# Patient Record
Sex: FEMALE | Race: WHITE | NOT HISPANIC OR LATINO | Employment: FULL TIME | URBAN - METROPOLITAN AREA
[De-identification: names, ages, dates, MRNs, and addresses within clinical notes are randomized per-mention and may not be internally consistent; named-entity substitution may affect disease eponyms.]

---

## 2017-02-09 ENCOUNTER — COMMUNICATION - HEALTHEAST (OUTPATIENT)
Dept: FAMILY MEDICINE | Facility: CLINIC | Age: 37
End: 2017-02-09

## 2017-03-10 ENCOUNTER — RECORDS - HEALTHEAST (OUTPATIENT)
Dept: ADMINISTRATIVE | Facility: OTHER | Age: 37
End: 2017-03-10

## 2017-05-17 ENCOUNTER — OFFICE VISIT - HEALTHEAST (OUTPATIENT)
Dept: FAMILY MEDICINE | Facility: CLINIC | Age: 37
End: 2017-05-17

## 2017-05-17 DIAGNOSIS — J01.90 ACUTE SINUSITIS: ICD-10-CM

## 2017-05-17 DIAGNOSIS — G43.829 MENSTRUAL MIGRAINE WITHOUT STATUS MIGRAINOSUS, NOT INTRACTABLE: ICD-10-CM

## 2017-05-26 ENCOUNTER — OFFICE VISIT - HEALTHEAST (OUTPATIENT)
Dept: FAMILY MEDICINE | Facility: CLINIC | Age: 37
End: 2017-05-26

## 2017-05-26 DIAGNOSIS — R10.9 ABDOMINAL PAIN: ICD-10-CM

## 2017-07-24 ENCOUNTER — OFFICE VISIT - HEALTHEAST (OUTPATIENT)
Dept: FAMILY MEDICINE | Facility: CLINIC | Age: 37
End: 2017-07-24

## 2017-07-24 DIAGNOSIS — N92.1 MENORRHAGIA WITH IRREGULAR CYCLE: ICD-10-CM

## 2017-07-24 ASSESSMENT — MIFFLIN-ST. JEOR: SCORE: 1392.56

## 2017-10-27 ENCOUNTER — COMMUNICATION - HEALTHEAST (OUTPATIENT)
Dept: FAMILY MEDICINE | Facility: CLINIC | Age: 37
End: 2017-10-27

## 2017-12-14 ENCOUNTER — OFFICE VISIT - HEALTHEAST (OUTPATIENT)
Dept: FAMILY MEDICINE | Facility: CLINIC | Age: 37
End: 2017-12-14

## 2017-12-14 DIAGNOSIS — R53.83 FATIGUE: ICD-10-CM

## 2017-12-14 DIAGNOSIS — Z00.00 ROUTINE GENERAL MEDICAL EXAMINATION AT A HEALTH CARE FACILITY: ICD-10-CM

## 2017-12-14 DIAGNOSIS — Z51.81 MEDICATION MONITORING ENCOUNTER: ICD-10-CM

## 2017-12-14 DIAGNOSIS — L65.9 HAIR LOSS: ICD-10-CM

## 2017-12-14 RX ORDER — HYDROXYCHLOROQUINE SULFATE 200 MG/1
TABLET, FILM COATED ORAL
Refills: 3 | Status: SHIPPED | COMMUNITY
Start: 2017-12-04 | End: 2023-09-28

## 2017-12-14 ASSESSMENT — MIFFLIN-ST. JEOR: SCORE: 1428.85

## 2017-12-19 LAB
HUMAN PAPILLOMA VIRUS 16 DNA: NEGATIVE
HUMAN PAPILLOMA VIRUS 18 DNA: NEGATIVE
HUMAN PAPILLOMA VIRUS FINAL DIAGNOSIS: NORMAL
HUMAN PAPILLOMA VIRUS OTHER HR: NEGATIVE
SPECIMEN DESCRIPTION: NORMAL

## 2017-12-21 LAB
BKR LAB AP ABNORMAL BLEEDING: NO
BKR LAB AP BIRTH CONTROL/HORMONES: NORMAL
BKR LAB AP CERVICAL APPEARANCE: NORMAL
BKR LAB AP GYN ADEQUACY: NORMAL
BKR LAB AP GYN INTERPRETATION: NORMAL
BKR LAB AP HPV REFLEX: NORMAL
BKR LAB AP LMP: NORMAL
BKR LAB AP PATIENT STATUS: NORMAL
BKR LAB AP PREVIOUS ABNORMAL: NORMAL
BKR LAB AP PREVIOUS NORMAL: 2011
HIGH RISK?: NO
PATH REPORT.COMMENTS IMP SPEC: NORMAL
RESULT FLAG (HE HISTORICAL CONVERSION): NORMAL

## 2018-03-06 ENCOUNTER — RECORDS - HEALTHEAST (OUTPATIENT)
Dept: ADMINISTRATIVE | Facility: OTHER | Age: 38
End: 2018-03-06

## 2018-03-18 ENCOUNTER — RECORDS - HEALTHEAST (OUTPATIENT)
Dept: ADMINISTRATIVE | Facility: OTHER | Age: 38
End: 2018-03-18

## 2018-03-31 ENCOUNTER — COMMUNICATION - HEALTHEAST (OUTPATIENT)
Dept: FAMILY MEDICINE | Facility: CLINIC | Age: 38
End: 2018-03-31

## 2018-03-31 DIAGNOSIS — G43.829 MENSTRUAL MIGRAINE WITHOUT STATUS MIGRAINOSUS, NOT INTRACTABLE: ICD-10-CM

## 2018-04-02 ENCOUNTER — COMMUNICATION - HEALTHEAST (OUTPATIENT)
Dept: FAMILY MEDICINE | Facility: CLINIC | Age: 38
End: 2018-04-02

## 2018-04-02 DIAGNOSIS — G43.829 MENSTRUAL MIGRAINE WITHOUT STATUS MIGRAINOSUS, NOT INTRACTABLE: ICD-10-CM

## 2018-06-28 ENCOUNTER — RECORDS - HEALTHEAST (OUTPATIENT)
Dept: ADMINISTRATIVE | Facility: OTHER | Age: 38
End: 2018-06-28

## 2018-06-30 ENCOUNTER — OFFICE VISIT - HEALTHEAST (OUTPATIENT)
Dept: FAMILY MEDICINE | Facility: CLINIC | Age: 38
End: 2018-06-30

## 2018-06-30 DIAGNOSIS — S13.9XXD ACUTE CERVICAL SPRAIN, SUBSEQUENT ENCOUNTER: ICD-10-CM

## 2018-06-30 DIAGNOSIS — M54.2 BILATERAL POSTERIOR NECK PAIN: ICD-10-CM

## 2018-07-09 ENCOUNTER — OFFICE VISIT - HEALTHEAST (OUTPATIENT)
Dept: FAMILY MEDICINE | Facility: CLINIC | Age: 38
End: 2018-07-09

## 2018-07-09 DIAGNOSIS — G43.829 MENSTRUAL MIGRAINE WITHOUT STATUS MIGRAINOSUS, NOT INTRACTABLE: ICD-10-CM

## 2018-07-09 DIAGNOSIS — M54.2 BILATERAL POSTERIOR NECK PAIN: ICD-10-CM

## 2018-07-09 DIAGNOSIS — S13.9XXD ACUTE CERVICAL SPRAIN, SUBSEQUENT ENCOUNTER: ICD-10-CM

## 2018-07-09 RX ORDER — RIZATRIPTAN BENZOATE 10 MG/1
TABLET ORAL
Qty: 10 TABLET | Refills: 5 | Status: SHIPPED | OUTPATIENT
Start: 2018-07-09 | End: 2023-06-19

## 2018-07-09 RX ORDER — NAPROXEN 500 MG/1
500 TABLET ORAL 2 TIMES DAILY WITH MEALS
Qty: 60 TABLET | Refills: 2 | Status: SHIPPED | OUTPATIENT
Start: 2018-07-09 | End: 2023-09-28

## 2018-07-09 RX ORDER — CYCLOBENZAPRINE HCL 10 MG
10 TABLET ORAL 3 TIMES DAILY PRN
Qty: 30 TABLET | Refills: 0 | Status: SHIPPED | OUTPATIENT
Start: 2018-07-09 | End: 2023-09-28

## 2018-07-09 RX ORDER — HYDROCODONE BITARTRATE AND ACETAMINOPHEN 5; 325 MG/1; MG/1
1 TABLET ORAL EVERY 4 HOURS PRN
Qty: 30 TABLET | Refills: 0 | Status: SHIPPED | OUTPATIENT
Start: 2018-07-09 | End: 2023-02-04

## 2018-07-09 ASSESSMENT — MIFFLIN-ST. JEOR: SCORE: 1424.77

## 2018-07-23 ENCOUNTER — OFFICE VISIT - HEALTHEAST (OUTPATIENT)
Dept: PHYSICAL THERAPY | Facility: REHABILITATION | Age: 38
End: 2018-07-23

## 2018-07-23 DIAGNOSIS — R29.3 POOR POSTURE: ICD-10-CM

## 2018-07-23 DIAGNOSIS — S16.1XXA STRAIN OF NECK MUSCLE, INITIAL ENCOUNTER: ICD-10-CM

## 2018-07-25 ENCOUNTER — RECORDS - HEALTHEAST (OUTPATIENT)
Dept: ADMINISTRATIVE | Facility: OTHER | Age: 38
End: 2018-07-25

## 2018-07-25 ENCOUNTER — OFFICE VISIT - HEALTHEAST (OUTPATIENT)
Dept: PHYSICAL THERAPY | Facility: REHABILITATION | Age: 38
End: 2018-07-25

## 2018-07-25 DIAGNOSIS — S16.1XXA STRAIN OF NECK MUSCLE, INITIAL ENCOUNTER: ICD-10-CM

## 2018-07-25 DIAGNOSIS — R29.3 POOR POSTURE: ICD-10-CM

## 2021-05-29 ENCOUNTER — RECORDS - HEALTHEAST (OUTPATIENT)
Dept: ADMINISTRATIVE | Facility: CLINIC | Age: 41
End: 2021-05-29

## 2021-05-30 ENCOUNTER — RECORDS - HEALTHEAST (OUTPATIENT)
Dept: ADMINISTRATIVE | Facility: CLINIC | Age: 41
End: 2021-05-30

## 2021-05-31 ENCOUNTER — RECORDS - HEALTHEAST (OUTPATIENT)
Dept: ADMINISTRATIVE | Facility: CLINIC | Age: 41
End: 2021-05-31

## 2021-05-31 VITALS — BODY MASS INDEX: 21.33 KG/M2 | WEIGHT: 144 LBS | HEIGHT: 69 IN

## 2021-05-31 VITALS — BODY MASS INDEX: 21.27 KG/M2 | WEIGHT: 144 LBS

## 2021-05-31 VITALS — WEIGHT: 152 LBS | BODY MASS INDEX: 22.51 KG/M2 | HEIGHT: 69 IN

## 2021-06-01 ENCOUNTER — RECORDS - HEALTHEAST (OUTPATIENT)
Dept: ADMINISTRATIVE | Facility: CLINIC | Age: 41
End: 2021-06-01

## 2021-06-01 VITALS — BODY MASS INDEX: 22.38 KG/M2 | WEIGHT: 151.1 LBS | HEIGHT: 69 IN

## 2021-06-01 VITALS — WEIGHT: 156.1 LBS | BODY MASS INDEX: 23.05 KG/M2

## 2021-06-02 ENCOUNTER — RECORDS - HEALTHEAST (OUTPATIENT)
Dept: ADMINISTRATIVE | Facility: CLINIC | Age: 41
End: 2021-06-02

## 2021-06-10 NOTE — PROGRESS NOTES
Subjective:      Patient ID: Viktoriya Lovelace is a 36 y.o. female.    Chief Complaint:    HPI Viktoriya Lovelace is a 36 y.o. female who presents today complaining of abdominal pain x 1 day.  Patient's pain is generalized and low.  She has a history of abdominal surgery with getting her tubes tied multiple years ago.  She denies any fevers, nausea, or vomiting.  She has a lack of appetite.  Her last bowel movement was today it was normal in consistency, she denies any blood in her stools.  Her last menstrual period was last week.  She does have some bloating, but that is typical of the week after her menstruation.  She was seen previously for similar abdominal pain in the emergency department, they thought that she may have a kidney stone which passed because there was blood in her urine.  Her abdominal pain intermittently radiates to the right side of her lower back.  She denies seeing any hematuria or any other urinary complaints such as frequency, dysuria, or difficulty passing urine.  The patient has tried Pepto-Bismol and fiber powder today with little benefit.  She does feel better when she crutches over.  Her pain seems worse when she lays completely flat.  She denies any nausea or vomiting abdominal pain is crampy in nature.      Past Medical History:   Diagnosis Date     Anxiety      Depression      Migraine        No past surgical history on file.    No family history on file.    Social History   Substance Use Topics     Smoking status: Former Smoker     Smokeless tobacco: None     Alcohol use No       Review of Systems   Constitutional: Positive for appetite change. Negative for fatigue and fever.   Gastrointestinal: Positive for abdominal pain. Negative for blood in stool, constipation, diarrhea, nausea and vomiting.   Genitourinary: Positive for flank pain. Negative for difficulty urinating, dysuria, enuresis, frequency, hematuria, menstrual problem and urgency.   Skin: Negative for rash.       Objective:      /66  Pulse 97  Temp 98  F (36.7  C) (Oral)   Resp 14  SpO2 97%  Breastfeeding? No    Physical Exam   Constitutional: Vital signs are normal. She appears well-developed and well-nourished.   She was sitting leaning forward holding her abdomen while sitting in the chair.   HENT:   Head: Normocephalic and atraumatic.   Right Ear: External ear normal.   Left Ear: External ear normal.   Eyes: Conjunctivae are normal.   Cardiovascular: Normal rate, regular rhythm and normal heart sounds.  Exam reveals no gallop and no friction rub.    No murmur heard.  Pulmonary/Chest: Effort normal and breath sounds normal. No respiratory distress. She has no wheezes. She has no rales.   Abdominal: Soft. She exhibits no distension and no mass. There is no hepatosplenomegaly. There is tenderness in the periumbilical area, suprapubic area and left lower quadrant. There is no rebound, no guarding, no CVA tenderness, no tenderness at McBurney's point and negative Mercado's sign.   Neurological: She is alert.   Skin: Skin is warm. No rash noted. She is not diaphoretic. No erythema. No pallor.     Labs:  Recent Results (from the past 24 hour(s))   Urinalysis Macro & Micro   Result Value Ref Range    Color, UA Yellow Colorless, Yellow, Straw, Light Yellow    Clarity, UA Clear Clear    Glucose, UA Negative Negative    Bilirubin, UA Negative Negative    Ketones, UA Negative Negative    Specific Gravity, UA 1.010 1.005 - 1.030    Blood, UA Moderate (!) Negative    pH, UA 6.5 5.0 - 8.0    Protein, UA Negative Negative mg/dL    Urobilinogen, UA 0.2 E.U./dL 0.2 E.U./dL, 1.0 E.U./dL    Nitrite, UA Negative Negative    Leukocytes, UA Negative Negative    Bacteria, UA None Seen None Seen hpf    RBC, UA 0-2 None Seen, 0-2 hpf    WBC, UA 0-5 None Seen, 0-5 hpf    Squam Epithel, UA 0-5 None Seen, 0-5 lpf     Radiology:  The following xray was ordered and preliminarily reviewed by me.     Xr Abdomen Flat And Upright    Result Date:  5/26/2017  XR ABDOMEN FLAT AND UPRIGHT 5/26/2017 6:45 PM INDICATION: Unspecified abdominal pain COMPARISON: None. FINDINGS: Moderate volume stool in the colon. Bowel gas pattern is normal. Nothing for obstruction or free air. No evidence for renal stones. This report was electronically interpreted by: Dr. Javier Pena MD ON 05/26/2017 at 18:59      Clinical Decision Making:  Although the patient had a significant history for kidney stone and she was reporting abdominal pain with radiation to the right flank, felt that the probability of this being a kidney stone was low considering that she was tender on the lower left quadrant.  Her abdominal x-ray showed significant constipation and no signs of renal calculi.  She was vitally stable today.  Her CBC did not show any signs of infection with a normal white blood cell count.  The patient has had 12 CT scans within the past 10 years.  I would like to try to avoid further radiation of her abdomen.  There were no signs of infection on her UA today and she was not having any urinary complaints.  She was prescribed Bentyl for abdominal cramp relief, and I recommended magnesium citrate and MiraLAX and Colace for constipation relief.  I recommended that she follow-up if she continues having abdominal pain after this conservative therapy.    At the end of the encounter, I discussed results, diagnosis, medications. Discussed red flags for immediate return to clinic/ER, as well as indications for follow up if no improvement. Patient understood and agreed to plan. Patient was stable for discharge.      Procedures        Assessment / Plan:     1. Abdominal pain  XR Abdomen Flat and Upright    HM1(CBC and Differential)    Urinalysis Macro & Micro    HM1 (CBC with Diff)    Pregnancy (Beta-hCG, Qual), Urine    dicyclomine (BENTYL) 20 mg tablet         Patient Instructions   1. Drink at least 4 liters of fluids a day  2. Get some Magnesium Citrate - Use as directed - drink 1/2  bottle with 8 ounces of water, then wait at least 6 hours and drink the other half with 8 ounces of water  3. Starting the next day, take 1 capful of Miralax in 8 ounces of water twice daily daily as needed for constipation. This can be tapered down to once per day when you're having more regular stools.   4. Take Colace available over the counter for more consistent regulation.    5. Follow up with one of the primary providers if she still is having trouble over the next week.  6. Dicyclomine (Bentyl) can help with cramping pain take one tab every 6-8 hours as needed for pain.  7. Seek emergency medical attention if you develop vomiting, fever, right lower abdominal pain, blood in the stools.

## 2021-06-10 NOTE — PROGRESS NOTES
Assessment:    1. Acute sinusitis  amoxicillin-clavulanate (AUGMENTIN) 875-125 mg per tablet   2. Menstrual migraine without status migrainosus, not intractable  rizatriptan (MAXALT) 10 MG tablet         Plan:    Discussed sinusitis management.  Augmentin 875/125 twice daily ×10 days.  Continue cetirizine 10 mg daily with addition of fluticasone nasal spray 2 sprays per nostril daily over next 14-30 days then as needed.  Did refill Maxalt also for abortive treatment for migraine headaches with menstrual component currently.  Follow-up with worsening or not improving.        Subjective:    Viktoriya Lovelace is seen today for evaluation of ongoing illness.  Sore throat over past 2 weeks now improved.  3 of her children had strep apparently.  Sinus congestion and postnasal drainage.  Some nausea.  Decreased appetite.  Mild stomachache.  Also has migraine headaches associated with menses.  Would like refill on Maxalt which has been effective if she uses 10 mg tablet at onset and may repeat every 2 hours up to 30 mg per day.  Comprehensive review of systems as above otherwise all negative.  Normal appetite.  No diarrhea.  No fevers.     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     No family history on file.     Past Medical History:   Diagnosis Date     Anxiety      Depression      Migraine         Social History   Substance Use Topics     Smoking status: Former Smoker     Smokeless tobacco: None     Alcohol use No        Current Outpatient  Prescriptions   Medication Sig Dispense Refill     clobetasol (TEMOVATE) 0.05 % external solution APPLY 1 APPLICATION AT BEDTIME TOPICALLY. MASSAGE INTO DRY SCALP AT BEDTIME, AVOID FACE/GENITALS  11     naproxen (NAPROSYN) 500 MG tablet Take 500 mg by mouth 2 (two) times a day with meals. During menstraul cycle or during migrain HA       rizatriptan (MAXALT) 10 MG tablet take one tablet by mouth at onset, may repeat in 2 hours as needed (max 30 mg/day) 10 tablet 5     amoxicillin-clavulanate (AUGMENTIN) 875-125 mg per tablet Take 1 tablet by mouth 2 (two) times a day for 10 days. 20 tablet 0     cetirizine (ZYRTEC) 10 MG tablet Take 1 tablet (10 mg total) by mouth daily. 30 tablet 0     cyclobenzaprine (FLEXERIL) 5 MG tablet Take 1 tablet (5 mg total) by mouth 3 (three) times a day as needed (max 3 in a day). 30 tablet 1     eszopiclone (LUNESTA) 2 MG Tab Take 1 tablet (2 mg total) by mouth bedtime. Take immediately before bedtime 30 tablet 3     meclizine (ANTIVERT) 25 mg tablet Take 1 tablet (25 mg total) by mouth 3 (three) times a day as needed for dizziness or nausea. 30 tablet 0     metroNIDAZOLE (METROGEL) 1 % gel        metroNIDAZOLE (NORITRATE) 1 % cream Apply once daily as directed 60 g 3     phenazopyridine (PYRIDIUM) 200 MG tablet Take 200 mg by mouth 3 (three) times a day as needed for pain.       No current facility-administered medications for this visit.           Objective:    Vitals:    05/17/17 1447   BP: 100/70   Pulse: 88   Weight: 144 lb (65.3 kg)      Body mass index is 21.27 kg/(m^2).    Alert.  Mildly ill.  Nontoxic.  HEENT exam was slight conjunctival injection bilaterally.  TMs appear normal bilaterally.  Nasopharynx of left greater than right nasal congestion with oropharynx with postnasal drainage.  Moist mucous membranes.  Neck with shotty lymphadenopathy.  Chest clear.  Cardiac exam regular.  No tachycardia.  Extremities warm and dry with good skin turgor.

## 2021-06-12 NOTE — PROGRESS NOTES
Assessment:     1. Menorrhagia with irregular cycle         Plan:     1. Menorrhagia with irregular cycle        Subjective:   This is the first clinic visit for this 36-year-old patient who is a para 2001 with a blended family of 4 children all teenagers living at home.  Patient gets normal 28 day cycle but this month has had a breakthrough bleed which is lasted all 3 days and patient is somewhat startled by this.  She is active raising a family has no weight changes no anorexia no abdominal pain no urgency frequency dysuria.  She is being treated by dermatology for a form of alopecia of the scalp and now is been placed on minocycline which interestingly enough was begun 3 or 4 days prior to the onset of this nosebleed.  Previously she was being treated with corticosteroid injections in her scalp.  Recent thyroid examination ultrasound including thyroid blood studies were all normal.  All of the studies were reviewed with the patient was seen for abdominal pain in the last month of the emergency room and a CBC revealed mild anemia patient is started on an iron tablet.  I feel that that this breakthrough bleed is not significant and may be related to hormonal changes brought on by the institution of the new antibiotic just enough to tip the balance to cause a new..  I did explain this to the patient and we will watchfully wait through another cycle she can expect another cycle with him 2-4 weeks and then we will would watch another month should she have continued breakthrough bleeding the referral to OB/GYN for examination and ultrasound would be indicated all medical questions that were asked were answered I personally reviewed family and social history surgeries allergies problems list no medication was prescribed today.    Review of Systems: A complete 14 point review of systems was obtained and is negative or as stated in the history of present illness.    Past Medical History:   Diagnosis Date     Anxiety       "Depression      Migraine      No family history on file.  No past surgical history on file.  Social History   Substance Use Topics     Smoking status: Former Smoker     Smokeless tobacco: None     Alcohol use No         Objective:   /72  Pulse 84  Ht 5' 9\" (1.753 m)  Wt 144 lb (65.3 kg)  LMP 07/20/2017  SpO2 99%  BMI 21.27 kg/m2    General Appearance:  Alert, cooperative, no distress  Head:  Normocephalic, no obvious abnormality  Ears: TM anatomy normal  Eyes:  PERRL, EOM's intact, conjunctiva and corneas clear  Nose:  Nares symmetrical, septum midline, mucosa pink, no sinus tenderness  Throat:  Lips, tongue, and mucosa are moist, pink, and intact  Neck:  Supple, symmetrical, trachea midline, no adenopathy; thyroid: no enlargement, symmetric,no tenderness/mass/nodules; no carotid bruit, no JVD  Back:  Symmetrical, no curvature, ROM normal, no CVA tenderness  Chest/Breast:  No mass or tenderness  Lungs:  Clear to auscultation bilaterally, respirations unlabored   Heart:  Normal PMI, regular rate & rhythm, S1 and S2 normal, no murmurs, rubs, or gallops  Abdomen:  Soft, non-tender, bowel sounds active all four quadrants, no mass, or organomegaly  Musculoskeletal:  Tone and strength strong and symmetrical, all extremities  Lymphatic:  No adenopathy  Skin/Hair/Nails:  Skin warm, dry, and intact, no rashes  Neurologic:  Alert and oriented x3, no cranial nerve deficits, normal strength and tone, gait steady  Extremities:  No edema.  Mildred's sign negative.    Genitourinary: deferred  Pulses:  Equal bilaterally           This note has been dictated using voice recognition software. Any grammatical or context distortions are unintentional and inherent to the the software.   "

## 2021-06-14 NOTE — PROGRESS NOTES
ASSESSMENT:  1. Routine general medical examination at a health care facility  Healthy 37-year-old female.  We update her Pap smear today.  Encouraged healthy eating and exercise.  - Gynecologic Cytology (PAP Smear)    2. Fatigue  She is experiencing generalized fatigue.  Will do workup.  Of note she has diagnosed with a autoimmune hair loss.  - HM1(CBC and Differential)  - Comprehensive Metabolic Panel  - Thyroid Cascade  - Vitamin B12  - Vitamin D, Total (25-Hydroxy)  - Thyroid Peroxidase Antibody  - HM1 (CBC with Diff)    3. Hair loss  She continues to have hair loss.  She has been placed on Plaquenil by dermatology.  She states she has a history of a thyroid problem we will check a thyroid peroxidase antibody to check for Hashimoto's.  - Thyroid Peroxidase Antibody    4. Medication monitoring encounter  Because of the prescription for hydroxychloroquine by dermatology, she is recommended to see ophthalmology and referral is placed.  - Ambulatory referral to Ophthalmology        PLAN:  There are no Patient Instructions on file for this visit.    Orders Placed This Encounter   Procedures     Comprehensive Metabolic Panel     Thyroid Cascade     Vitamin B12     Vitamin D, Total (25-Hydroxy)     Thyroid Peroxidase Antibody     HM1 (CBC with Diff)     Ambulatory referral to Ophthalmology     Referral Priority:   Routine     Referral Type:   Consultation     Referral Reason:   Evaluation and Treatment     Requested Specialty:   Ophthalmology     Number of Visits Requested:   1     Medications Discontinued During This Encounter   Medication Reason     cyclobenzaprine (FLEXERIL) 5 MG tablet Therapy completed     diazePAM (VALIUM) 5 MG tablet      dicyclomine (BENTYL) 20 mg tablet      eszopiclone (LUNESTA) 2 MG Tab Therapy completed     HYDROcodone-acetaminophen 5-325 mg per tablet      metroNIDAZOLE (METROGEL) 1 % gel      metroNIDAZOLE (NORITRATE) 1 % cream Therapy completed     minocycline (MINOCIN,DYNACIN) 100 MG  capsule      phenazopyridine (PYRIDIUM) 200 MG tablet        No Follow-up on file.    CHIEF COMPLAINT:  Chief Complaint   Patient presents with     Annual Exam       HISTORY OF PRESENT ILLNESS:  Viktoriya is a 37 y.o. female presenting to the clinic today for an annual exam. She has been feeling more fatigued lately. She has been otherwise doing well.    Hair Loss: She has been following [facility] for hair loss. She was diagnosed with frontal fibrosing alopecia. She states that she has tried steroid injections and medications that have not been working. Blood work was completed through [facility] which revealed she had low iron levels. She states that she has been experiencing thinning in her hair and scarring of her scalp. She has noticed clumps of hair falling out. She is wondering if her symptoms are consistent with her low iron levels. She was given a prescription for hydroxychloroquine 400 mg daily and was told she needs a referral for ophthalmology to get baseline information for her vision. She states that she has been experiencing itching and burning sensations on her scalp since June. She was told by a provider at [facility] that the itching and burning symptoms are consistent with active alopecia and that she would need to take a break from the steroid injection.    REVIEW OF SYSTEMS:   She is unsure how much protein she gets on a daily basis. She previously threw her back out and was given Valium. She states she experienced some adverse reactions from the Valium. Her last period was 12/3/17. She denies sleeping or eating problems, and states she has been exercising. She denies stomach problems. All other systems are negative.    PFSH:  She has been vegetarian as of 5 years ago. She states having a thyroid flare up in [place]. She had an abnormal Pap smear about 12 years ago. 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 Reviewed as below.    History   Smoking Status     Former Smoker     Quit date: 2012   Smokeless Tobacco     Never Used       Family History   Problem Relation Age of Onset     Melanoma Mother      on face     Diabetes type II Father      Coronary artery disease Father      Acute Myocardial Infarction Father        Social History     Social History     Marital status:      Spouse name: N/A     Number of children: N/A     Years of education: N/A     Occupational History     Not on file.     Social History Main Topics     Smoking status: Former Smoker     Quit date: 2012     Smokeless tobacco: Never Used     Alcohol use No     Drug use: No     Sexual activity: Not on file     Other Topics Concern     Not on file     Social History Narrative       Past Surgical History:   Procedure Laterality Date     Cosmetic Ear Surgery       TUBAL LIGATION         No Known Allergies    Active Ambulatory Problems     Diagnosis Date Noted     Abnormal Pap Smear Of Cervix      Anxiety      Depression      Mucous retention cyst of maxillary sinus 12/03/2015     Migraine 12/03/2015     Resolved Ambulatory Problems     Diagnosis Date Noted     No Resolved Ambulatory Problems     Past Medical History:   Diagnosis Date     Kidney stones        VITALS:  Vitals:    12/14/17 1548   BP: 106/78   Pulse: 64   Resp: 18   Weight: 152 lb (68.9 kg)   Height: 5' 9\" (1.753 m)     Wt Readings from Last 3 Encounters:   12/14/17 152 lb (68.9 kg)   10/15/17 147 lb (66.7 kg)   07/24/17 144 lb (65.3 kg)     Body mass index is 22.45 kg/(m^2).    PHYSICAL EXAM:  General Appearance: Alert, pleasant, appears stated age  Head: Normocephalic, without obvious abnormality  Eyes: PERRL, conjunctiva/corneas clear, EOM's intact  Ears: Normal TM's and external ear canals, both ears  Nose: Nares normal, septum midline,mucosa normal, no drainage  Throat: Lips, mucosa, and tongue normal; teeth and gums normal; oropharynx is clear  Neck: Supple,without " lymphadenopathy or thyromegaly  Lungs: Clear to auscultation bilaterally, respirations unlabored  Breasts: No palpable masses, tenderness, asymmetry, or nipple discharge. No axillary or supraclavicular lymphadenopathy  Heart: Regular rate and rhythm, no murmur   Abdomen: Soft, non-tender, no masses, no organomegaly  Pelvic: Normally developed genitalia with no external lesions or eruptions. Vagina and cervix show no lesions. No cystocele, No rectocele. Uterus normal.  No adnexal mass or tenderness.  Extremities: Extremities with strong and symmetric pulses, no cyanosis or edema  Skin: Skin color, texture normal, no rashes or lesions  Neuro: Normal    RECENT RESULTS  Recent Results (from the past 48 hour(s))   HM1 (CBC with Diff)    Collection Time: 12/14/17  4:55 PM   Result Value Ref Range    WBC 5.2 4.0 - 11.0 thou/uL    RBC 4.28 3.80 - 5.40 mill/uL    Hemoglobin 12.7 12.0 - 16.0 g/dL    Hematocrit 37.4 35.0 - 47.0 %    MCV 88 80 - 100 fL    MCH 29.8 27.0 - 34.0 pg    MCHC 34.0 32.0 - 36.0 g/dL    RDW 11.6 11.0 - 14.5 %    Platelets 230 140 - 440 thou/uL    MPV 7.4 7.0 - 10.0 fL    Neutrophils % 61 50 - 70 %    Lymphocytes % 29 20 - 40 %    Monocytes % 6 2 - 10 %    Eosinophils % 3 0 - 6 %    Basophils % 0 0 - 2 %    Neutrophils Absolute 3.2 2.0 - 7.7 thou/uL    Lymphocytes Absolute 1.5 0.8 - 4.4 thou/uL    Monocytes Absolute 0.3 0.0 - 0.9 thou/uL    Eosinophils Absolute 0.2 0.0 - 0.4 thou/uL    Basophils Absolute 0.0 0.0 - 0.2 thou/uL         ADDITIONAL HISTORY SUMMARIZED (2): Dermatology note 8/15/16 reviewed, alopecia.  DECISION TO OBTAIN EXTRA INFORMATION (1): None.   RADIOLOGY TESTS (1): None.  LABS (1): Labs reviewed 5/26/17.  MEDICINE TESTS (1): None.  INDEPENDENT REVIEW (2 each): None.     The visit lasted a total of 23 minutes face to face with the patient. Over 50% of the time was spent counseling and educating the patient about alopecia and general health maintenance.    I, Dee Shell, am scribing  for and in the presence of, Dr. Valente.    I, Dr. Valente personally performed the services described in this documentation, as scribed by Dee Shell in my presence, and it is both accurate and complete.    MEDICATIONS:  Current Outpatient Prescriptions   Medication Sig Dispense Refill     clobetasol (TEMOVATE) 0.05 % external solution APPLY 1 APPLICATION AT BEDTIME TOPICALLY. MASSAGE INTO DRY SCALP AT BEDTIME, AVOID FACE/GENITALS  11     naproxen (NAPROSYN) 500 MG tablet Take 500 mg by mouth 2 (two) times a day with meals. During menstraul cycle or during migrain HA       rizatriptan (MAXALT) 10 MG tablet take one tablet by mouth at onset, may repeat in 2 hours as needed (max 30 mg/day) 10 tablet 5     hydroxychloroquine (PLAQUENIL) 200 mg tablet TK 1 T PO BID WITH FOOD OR MILK  3     No current facility-administered medications for this visit.        Total data points: 3

## 2021-06-19 NOTE — PROGRESS NOTES
ASSESSMENT/PLAN:  1. Menstrual migraine without status migrainosus, not intractable  History of migraine headaches.  Well controlled using Maxalt.  She rarely needs to use this at this time.  A refill is supplied.  - rizatriptan (MAXALT) 10 MG tablet; TAKE 1 TABLET BY MOUTH AT ONSET, MAY REPEAT IN 2 HOURS AS NEEDED( MAX OF 30MG PER DAY)  Dispense: 10 tablet; Refill: 5    2. Bilateral posterior neck pain  Neck pain that began after moving.  She is on the process of moving and is been unable to rest so continues to aggravate it.  We discussed using ice, ibuprofen or naproxen, and Vicodin or Flexeril as needed for more significant pain.  Advised her to primarily use this at night to help her sleep and that symptoms should improve over the next 1-2 weeks and certainly once she is completed the moving.  At any point she is feeling radicular symptoms, or the pain is not improving, further evaluation will be needed.  - HYDROcodone-acetaminophen 5-325 mg per tablet; Take 1 tablet by mouth every 4 (four) hours as needed for pain.  Dispense: 30 tablet; Refill: 0    3. Acute cervical sprain, subsequent encounter  - HYDROcodone-acetaminophen 5-325 mg per tablet; Take 1 tablet by mouth every 4 (four) hours as needed for pain.  Dispense: 30 tablet; Refill: 0  - Ambulatory referral to PT/OT    She has a mole on her right cheek.  It is very small and seems benign in nature.  Advised that if it is growing rapidly, bleeds or opens that she should be seen by dermatology.  She is having more constipation.  Likely aggravated by her use of Vicodin.  Encouraged her to use MiraLAX as needed to resolve any constipation symptoms and to taper off once it is more regular.    Patient Instructions   Monitor the mole on your right cheek. If this is rapidly growing or changing, see dermatology.    Consider taking over the counter MiraLAX for constipation.   - Take 17 g once daily for a couple of weeks  - Taper off of it once your bowel movements  are more regular      Orders Placed This Encounter   Procedures     Ambulatory referral to PT/OT     Referral Priority:   Routine     Referral Type:   Physical Therapy or Occupational Therapy     Referral Reason:   Evaluation and Treatment     Requested Specialty:   Physical Therapy     Number of Visits Requested:   1     Medications Discontinued During This Encounter   Medication Reason     oxyCODONE-acetaminophen (PERCOCET) 5-325 mg per tablet Therapy completed     rizatriptan (MAXALT) 10 MG tablet Duplicate order     clobetasol (TEMOVATE) 0.05 % external solution Therapy completed     rizatriptan (MAXALT) 10 MG tablet Reorder     naproxen (NAPROSYN) 500 MG tablet Reorder     cyclobenzaprine (FLEXERIL) 10 MG tablet Reorder     HYDROcodone-acetaminophen 5-325 mg per tablet Reorder       No Follow-up on file.    CHIEF COMPLAINT;  Chief Complaint   Patient presents with     Concerns     needs refills for naproxen and maxalt     Neck Pain     seen in [facility] 6/30 - still having pain discuss refillls     Concerns     constipation - feeling bloated     Concerns     mole on face       HISTORY OF PRESENT ILLNESS:  Viktoriya is a 37 y.o. female presenting to the clinic today for neck pain.    Neck Pain: She was moving furniture and popped her neck about 10 days ago. She is able to move her neck a little bit to turn her head, otherwise she will turn her whole body to look somewhere. She was seen in [facility] 6/30/18 and was given Flexeril 10 mg and Vicodin 5-325 mg as needed. She felt these were helpful to improve her symptoms. Her discomfort is worse overnight while sleeping, especially while turning over in bed. She notes stiffness in the morning. She rates her pain at a 6 out of a 10 point sale. She localizes her pain mostly to the left side of her neck. This has not happened previously. She denies radiation down into her arms. She has been using ibuprofen only for the last few days. She is agreeable to trying to start physical  "therapy prior to moving to [place].     Constipation: She endorses increased bloating and aching on her left side. She noticed these symptoms prior to the pain medications she is taking for her neck. She is wondering if it could be a side effect from Plaquenil. She has 1-2 bowel movements per week. She takes a powder fiber supplement daily. This helps make her bowel movements softer and less painful. She did have some abdominal pain last year and was told to do a cleanse, which she felt was helpful.     REVIEW OF SYSTEMS:  She uses naproxen as needed for migraines. Skin is positive for new mole on her right cheek. All other systems are negative.    PFSH:  They are moving to [place] at the end of July. They originally thought they were going to move to [place]. Her daughter wanted to continue an ROTC program in high school, but the commute would be 45 minutes from their area in [place]. She just finished her masters degree and got her [employer] license. Reviewed, as below.    TOBACCO USE:  History   Smoking Status     Former Smoker     Quit date: 2012   Smokeless Tobacco     Never Used       VITALS:  Vitals:    07/09/18 1428   BP: 102/68   Pulse: (!) 108   Weight: 151 lb 1.6 oz (68.5 kg)   Height: 5' 9\" (1.753 m)     Wt Readings from Last 3 Encounters:   07/09/18 151 lb 1.6 oz (68.5 kg)   06/30/18 156 lb 1.6 oz (70.8 kg)   12/14/17 152 lb (68.9 kg)     Body mass index is 22.31 kg/(m^2).    PHYSICAL EXAM:  GENERAL APPEARANCE: Alert, cooperative, no distress, appears stated age  HEAD: Normocephalic, without obvious abnormality, atraumatic  NECK: Supple, symmetrical, trachea midline, no adenopathy  LUNGS: Clear to auscultation bilaterally, respirations unlabored  HEART: Regular rate and rhythm, S1 and S2 normal, no murmur, rub or gallop  ABDOMEN: Soft, non-tender, bowel sounds active all four quadrants,     no masses, no organomegaly  MSK: Tender along perispinous muscles and left trapezial ridge, decreased lateral rotation " bilaterally, left worse than right, Spurling's sign was negative.  SKIN: Skin color, texture, turgor normal, no rashes or lesions  NEUROLOGIC: CNII-XII intact.     RECENT RESULTS  No results found for this or any previous visit (from the past 48 hour(s)).      ADDITIONAL HISTORY SUMMARIZED (2): Her note 6/30/18 reviewed, neck pain given Vicodin and Flexeril.  DECISION TO OBTAIN EXTRA INFORMATION (1): None.  RADIOLOGY TESTS (1): None.  LABS (1): None.  MEDICINE TESTS (1): None.  INDEPENDENT REVIEW (2 each): None.    The visit lasted a total of 25 minutes face to face with the patient. Over 50% of the time was spent counseling and educating the patient about neck pain, constipation, and skin mole.    IDee, am scribing for and in the presence of, Dr. Valente.    I, Dr. Valente, personally performed the services described in this documentation, as scribed by Dee Shell in my presence, and it is both accurate and complete.    Dragon dictation was used for this note.  Speech recognition errors are a possibility.    MEDICATIONS:  Current Outpatient Prescriptions   Medication Sig Dispense Refill     cyclobenzaprine (FLEXERIL) 10 MG tablet Take 1 tablet (10 mg total) by mouth 3 (three) times a day as needed for muscle spasms. 30 tablet 0     HYDROcodone-acetaminophen 5-325 mg per tablet Take 1 tablet by mouth every 4 (four) hours as needed for pain. 30 tablet 0     hydroxychloroquine (PLAQUENIL) 200 mg tablet TK 1 T PO BID WITH FOOD OR MILK  3     IBUPROFEN IB ORAL Take 600 mg by mouth.       naproxen (NAPROSYN) 500 MG tablet Take 1 tablet (500 mg total) by mouth 2 (two) times a day with meals. During menstraul cycle or during migrain HA 60 tablet 2     rizatriptan (MAXALT) 10 MG tablet TAKE 1 TABLET BY MOUTH AT ONSET, MAY REPEAT IN 2 HOURS AS NEEDED( MAX OF 30MG PER DAY) 10 tablet 5     No current facility-administered medications for this visit.        Total data points: 2

## 2021-06-19 NOTE — PROGRESS NOTES
[facility]   Cervical Thoracic Initial Evaluation    Patient Name: Viktoriya Lovelace  Date of evaluation: 7/23/2018  Referral Diagnosis: Acute cervical sprain, subsequent encounter  Referring provider: Liz Valente MD  Visit Diagnosis:     ICD-10-CM    1. Strain of neck muscle, initial encounter S16.1XXA    2. Poor posture R29.3        Assessment:        Patient is a 37 y.o. female that presents with signs and symptoms consistent with L>R cervical neck pain secondary to recent neck sprain while moving furniture, as well as poor posture. Patient demonstrates impairments including decreased cervical neck range of motion and flexibility, with TTP and pain of L>R cervical paraspinals leading to impaired functional mobility. Patient's functional limitations include turning head for driving, maintaining appropriate posture, and sleeping throughout the night. Today patient responded well to manual therapy and therapeutic exercise. Patient does move to [place] next week but will be back at the end of August for a week.     Patient educated on and demonstrated understanding of nature of impairment, plan of care, patient role and HEP. Patient compliant with PT and prognosis is good. Patient would benefit from skilled PT to progress and improve above impairments.      The POC is dynamic and will be modified on an ongoing basis.  Patient will return to clinic if symptoms persist.  Barriers to achieving goals as noted in the assessment section may affect outcome.  Prognosis to achieve goals is  good   Pt. is appropriate for skilled PT intervention as outlined in the Plan of Care (POC).  Pt. is a good candidate for skilled PT services to improve pain levels and function.    Goals:  Pt. will be independent with home exercise program in : 4 weeks  Pt. will have improved quality of sleep: with less pain;waking less times/night;in 4 weeks  Pt. will improve posture : and demonstrate posture with minimal to no  cuing;in standing;in sitting;in 4 weeks  Patient will sit: 30 minutes;for driving;with no pain;with less difficultty;in 4 weeks  Patient Turn Head: for watching traffic;for driving;for conversation;with full ROM;with less pain;with less difficulty;in 4 weeks  Patient will decrease : NDI score;for improved quality of function;for improved quality of life;in 4 weeks    Patient's expectations/goals are realistic.    Barriers to Learning or Achieving Goals:  No Barriers.       Plan / Patient Instructions:        Plan of Care:   Authorization / Certification Start Date: 07/23/18  Authorization / Certification End Date: 10/23/18  Authorization / Certification Number of Visits: 12  Communication with: Referral Source  Patient Related Instruction: Nature of Condition;Treatment plan and rationale;Self Care instruction;Basis of treatment;Body mechanics;Posture;Next steps;Expected outcome  Times per Week: 1-2  Number of Weeks: 6-8  Number of Visits: 12  Therapeutic Exercise: ROM;Stretching;Strengthening  Neuromuscular Reeducation: posture;kinesio tape;core;TNE  Manual Therapy: soft tissue mobilization;myofascial release;joint mobilization;muscle energy  Modalities: traction    POC and pathology of condition were reviewed with patient.  Pt. is in agreement with the Plan of Care  A Home Exercise Program (HEP) was initiated today.  Pt. was instructed in exercises by PT and patient was given a handout with detailed instructions.    Plan for next visit: review HEP, breathing techniques, theraband exercises, cervical isometrics, kinesiotape trial     Subjective:         History of Present Illness:    Viktoriya is a 37 y.o. female who presents to therapy today with complaints of acute neck pain. Date of onset is 6/28/18 and onset was related to moving furniture, she felt an instant pain, she drove herself to urgent care with acute cervical strain. Symptoms are intermittent and getting better. Patient reports she wasn't able to fall  asleep, she does have pain medication that does help her sleep. She has been driving a lot that is increasing her symptoms - both of her in-laws passed away recently so they had to travel to [place]. She denies history of similar symptoms. She describes their previous level of function as not limited.     Pain Ratin  Pain rating at best: 4  Pain rating at worst: 9  Pain description: aching and dull    Functional limitations are described as occurring with:   turning head  performing routine daily activities  sitting : driving for longer periods  sleeping    Patient reports benefit from:  rest  , pain medication, heat         Objective:      Note: Items left blank indicates the item was not performed or not indicated at the time of the evaluation.    Patient Outcome Measures :     Neck Disability Score in %: 18   Scores range from 0-100%, where a score of 0% represents minimal pain and maximal function. The minmal clinically important difference is a score reduction of 10%.    Cervical Thoracic Examination  1. Strain of neck muscle, initial encounter     2. Poor posture       Involved side: Left and Bilateral  Posture Observation:      General sitting posture is  fair.  General standing posture is fair.    Cervical ROM:    Date: 2018     *Indicate scale AROM AROM AROM   Cervical Flexion 40     Cervical Extension 35 P      Right Left Right Left Right Left   Cervical Sidebending 50 35 P       Cervical Rotation 50 45 P       Cervical Protraction      Cervical Retraction      Thoracic Flexion      Thoracic Extension      Thoracic Sidebending         Thoracic Rotation           Strength   WFL and no pain  Date: 2018     Cervical Myotomes/5 Right Left Right Left Right Left   Cervical Flexion (C1-2)         Cervical Sidebending (C3)         Shoulder Elevation (C4)         Shoulder Abduction (C5)         Elbow Flexion (C6)         Elbow Extension (C7)         Wrist Flexion (C7)         Wrist Extension (C6)          Thumb abduction (C8)         Finger Abduction (T1)           Sensation   WFL      Reflex Testing  Cervical Dermatomes Right Left UE Reflexes Right Left   Back of the Head (C2)   Biceps (C5-6)     Supraclavicular Fossa (C3)   Brachioradialis (C5-6)     AC Joint (C4)   Triceps (C7-8)     Lateral Biceps (C5)   Ac s test     Palmar Thumb (C6)   LE Reflexes     Palmar 3rd Finger (C7)   Patellar (L3-4)     Palmar 5th Finger (C8)   Achilles (S1-2)     Ulnar Forearm (T1)   Babinski Response         Cervical Special Tests   NT  Cervical Special Tests Right Left UE Nerve Mobility Right Left   Cervical compression   Median nerve     Cervical distraction   Ulnar nerve     Spurling s test   Radial nerve     Shoulder abduction sign   Thoracic outlet     Deep neck flexor endurance test   Sintia     Upper cervical rotation   Adson s     Sharper-Leydi   Cervical rotation lateral flexion     Alar ligament test   Other:     Other:   Other:         Flexibility/Tissue Extensibility: decreased of L>R UT, levators and cervical paraspinals  Palpation: TTP of L C3-7 with joint mobs and MFR of cervical paraspinals  Passive Mobility-Joint Integrity: Hypomobile.      Treatment Today      Patient Education: Patient educated on plan of care, prognosis, PT/patient role and HEP. Patient educated on impairments related to condition and reproduction of symptoms. Patient instructed to focus on the small goals and this may be a long process to recovery, and that exercises at home are just as important as coming to therapy. Patient was educated on importance of activity modification and exercise consistency in order to see progress and change. Patient demonstrated and verbalized understanding.     Manual Therapy:  MFR of L>R cervical paraspinals and UT - TTP with slight pain  Gentle cervical upglides/mobs to increase motion - slight pain    Exercises:  Exercise #1: supine or seated chin tuck - hold 5 sec x 10  Comment #1: scapular retraction -  hold 5 sec perform all day long  Exercise #2: UT and levator stretch - hold 30 sec x 2  Comment #2: pec doorway stretch - hold 30 sec x 2      TREATMENT MINUTES COMMENTS   Evaluation 20    Self-care/ Home management     Manual therapy 10 MFR of L>R cervical paraspinals and UT - TTP with slight pain  Gentle cervical upglides/mobs to increase motion - slight pain   Neuromuscular Re-education     Therapeutic Activity     Therapeutic Exercises 10 See flowsheet; posture review   Gait training     Modality__________________                Total 40    Blank areas are intentional and mean the treatment did not include these items.     PT Evaluation Code: (Please list factors)  Patient History/Comorbidities: acute neck sprain  Examination: decreased cervical range of motion and flexibility  Clinical Presentation: stable  Clinical Decision Making: low    Patient History/  Comorbidities Examination  (body structures and functions, activity limitations, and/or participation restrictions) Clinical Presentation Clinical Decision Making (Complexity)   No documented Comorbidities or personal factors 1-2 Elements Stable and/or uncomplicated Low   1-2 documented comorbidities or personal factor 3 Elements Evolving clinical presentation with changing characteristics Moderate   3-4 documented comorbidities or personal factors 4 or more Unstable and unpredictable High                Karen Rojas, PT  7/23/2018  11:32 AM

## 2021-06-19 NOTE — PROGRESS NOTES
Chief Complaint   Patient presents with     Neck Pain     Started Thursday. Was moving furniture and looked back and popped her neck. Can't move neck at all and says the pain is getting worse. Went to urgent care on 06/28/2018 and was given percocet and flexeril and pt says it doesn't work. Ususally takes Vicoden and helps a lot better.           HPI    Patient is here for 2 days of moderate to severe constant bilateral posterior looney pain radiating to upper back and shoulder, started immediately after she turned her head while carrying a furniture and felt a pop in her neck. Pain worsened with neck movement.  She was seen in urgency room and discharged with Percocet and Flexeril without relief. Percocet made her itch and foggy. She said she tolerated Vicodin well in the past. No other neck injuries. No fever, chills, chest pain, shortness of breath, tingling/numbness of arms.     ROS: Pertinent ROS noted in HPI.     No Known Allergies    Patient Active Problem List   Diagnosis     Abnormal Pap Smear Of Cervix     Anxiety     Depression     Mucous retention cyst of maxillary sinus     Migraine       Family History   Problem Relation Age of Onset     Melanoma Mother      on face     Diabetes type II Father      Coronary artery disease Father      Acute Myocardial Infarction Father      Social History     Social History     Marital status:      Spouse name: N/A     Number of children: N/A     Years of education: N/A     Occupational History     Not on file.     Social History Main Topics     Smoking status: Former Smoker     Quit date: 2012     Smokeless tobacco: Never Used     Alcohol use No     Drug use: No     Sexual activity: Not on file     Other Topics Concern     Not on file     Social History Narrative         Objective:    Vitals:    06/30/18 1248   BP: 130/60   Pulse: (!) 111   Resp: 16   Temp: 98.3  F (36.8  C)   SpO2: 98%       Gen:NAD  Neck: patient held her neck fixed slightly deviated to the  left. Minimal range of motion of neck due to pain. Mild pain to palpation of bilateral posterior neck without pain on cervical spine.   CV: RRR, normal S1S2, no M, R, G  Pulm: CTAB, normal effort  MSK: normal inspection of back, upper extremities. Mild pain at bilateral upper trapezius. No pain to palpation of T, and L spines. Full ROM and 5/5 strength of bilateral upper extremities. Normal gait.    XR cervical spine - no acute bony abnormalities per my interpretation, discussed during visit.        Acute cervical sprain, subsequent encounter - STOP percocet.   -     HYDROcodone-acetaminophen 5-325 mg per tablet; Take 1 tablet by mouth every 4 (four) hours as needed for pain.    Bilateral posterior neck pain  -     XR Cervical Spine 2 - 3 VWS  -     HYDROcodone-acetaminophen 5-325 mg per tablet; Take 1 tablet by mouth every 4 (four) hours as needed for pain.

## 2021-12-03 ENCOUNTER — TRANSFERRED RECORDS (OUTPATIENT)
Dept: MULTI SPECIALTY CLINIC | Facility: CLINIC | Age: 41
End: 2021-12-03

## 2021-12-03 LAB — PAP-ABSTRACT: NORMAL

## 2023-02-04 ENCOUNTER — APPOINTMENT (OUTPATIENT)
Dept: CT IMAGING | Facility: CLINIC | Age: 43
End: 2023-02-04
Attending: FAMILY MEDICINE
Payer: OTHER GOVERNMENT

## 2023-02-04 ENCOUNTER — HOSPITAL ENCOUNTER (EMERGENCY)
Facility: CLINIC | Age: 43
Discharge: HOME | End: 2023-02-04
Attending: FAMILY MEDICINE | Admitting: FAMILY MEDICINE
Payer: OTHER GOVERNMENT

## 2023-02-04 VITALS
WEIGHT: 180 LBS | SYSTOLIC BLOOD PRESSURE: 106 MMHG | DIASTOLIC BLOOD PRESSURE: 67 MMHG | HEIGHT: 69 IN | BODY MASS INDEX: 26.66 KG/M2 | HEART RATE: 70 BPM | OXYGEN SATURATION: 100 % | RESPIRATION RATE: 18 BRPM | TEMPERATURE: 99.3 F

## 2023-02-04 DIAGNOSIS — R10.31 ABDOMINAL PAIN, RIGHT LOWER QUADRANT: ICD-10-CM

## 2023-02-04 PROBLEM — N94.6 DYSMENORRHEA: Status: ACTIVE | Noted: 2023-02-04

## 2023-02-04 PROBLEM — F17.201 NICOTINE DEPENDENCE IN REMISSION: Status: ACTIVE | Noted: 2023-02-04

## 2023-02-04 LAB
ALBUMIN UR-MCNC: NEGATIVE MG/DL
ANION GAP SERPL CALCULATED.3IONS-SCNC: 10 MMOL/L (ref 5–18)
APPEARANCE UR: CLEAR
BACTERIA #/AREA URNS HPF: ABNORMAL /HPF
BASOPHILS # BLD AUTO: 0 10E3/UL (ref 0–0.2)
BASOPHILS NFR BLD AUTO: 0 %
BILIRUB UR QL STRIP: NEGATIVE
BUN SERPL-MCNC: 6 MG/DL (ref 8–22)
C REACTIVE PROTEIN LHE: <0.1 MG/DL (ref 0–?)
CALCIUM SERPL-MCNC: 9 MG/DL (ref 8.5–10.5)
CHLORIDE BLD-SCNC: 108 MMOL/L (ref 98–107)
CO2 SERPL-SCNC: 22 MMOL/L (ref 22–31)
COLOR UR AUTO: COLORLESS
CREAT SERPL-MCNC: 0.77 MG/DL (ref 0.6–1.1)
EOSINOPHIL # BLD AUTO: 0.1 10E3/UL (ref 0–0.7)
EOSINOPHIL NFR BLD AUTO: 2 %
ERYTHROCYTE [DISTWIDTH] IN BLOOD BY AUTOMATED COUNT: 12.8 % (ref 10–15)
GFR SERPL CREATININE-BSD FRML MDRD: >90 ML/MIN/1.73M2
GLUCOSE BLD-MCNC: 91 MG/DL (ref 70–125)
GLUCOSE UR STRIP-MCNC: NEGATIVE MG/DL
HCT VFR BLD AUTO: 38.2 % (ref 35–47)
HGB BLD-MCNC: 12.5 G/DL (ref 11.7–15.7)
HGB UR QL STRIP: ABNORMAL
IMM GRANULOCYTES # BLD: 0 10E3/UL
IMM GRANULOCYTES NFR BLD: 0 %
KETONES UR STRIP-MCNC: NEGATIVE MG/DL
LEUKOCYTE ESTERASE UR QL STRIP: NEGATIVE
LYMPHOCYTES # BLD AUTO: 1.9 10E3/UL (ref 0.8–5.3)
LYMPHOCYTES NFR BLD AUTO: 29 %
MCH RBC QN AUTO: 26.8 PG (ref 26.5–33)
MCHC RBC AUTO-ENTMCNC: 32.7 G/DL (ref 31.5–36.5)
MCV RBC AUTO: 82 FL (ref 78–100)
MONOCYTES # BLD AUTO: 0.4 10E3/UL (ref 0–1.3)
MONOCYTES NFR BLD AUTO: 6 %
NEUTROPHILS # BLD AUTO: 4.2 10E3/UL (ref 1.6–8.3)
NEUTROPHILS NFR BLD AUTO: 63 %
NITRATE UR QL: NEGATIVE
NRBC # BLD AUTO: 0 10E3/UL
NRBC BLD AUTO-RTO: 0 /100
PH UR STRIP: 5.5 [PH] (ref 5–7)
PLATELET # BLD AUTO: 255 10E3/UL (ref 150–450)
POTASSIUM BLD-SCNC: 3.5 MMOL/L (ref 3.5–5)
RBC # BLD AUTO: 4.66 10E6/UL (ref 3.8–5.2)
RBC URINE: 1 /HPF
SODIUM SERPL-SCNC: 140 MMOL/L (ref 136–145)
SP GR UR STRIP: 1 (ref 1–1.03)
SQUAMOUS EPITHELIAL: 1 /HPF
UROBILINOGEN UR STRIP-MCNC: <2 MG/DL
WBC # BLD AUTO: 6.7 10E3/UL (ref 4–11)
WBC URINE: 1 /HPF

## 2023-02-04 PROCEDURE — 250N000011 HC RX IP 250 OP 636: Performed by: FAMILY MEDICINE

## 2023-02-04 PROCEDURE — 36415 COLL VENOUS BLD VENIPUNCTURE: CPT | Performed by: FAMILY MEDICINE

## 2023-02-04 PROCEDURE — 96374 THER/PROPH/DIAG INJ IV PUSH: CPT

## 2023-02-04 PROCEDURE — 99285 EMERGENCY DEPT VISIT HI MDM: CPT | Mod: 25

## 2023-02-04 PROCEDURE — 85014 HEMATOCRIT: CPT | Performed by: FAMILY MEDICINE

## 2023-02-04 PROCEDURE — 81001 URINALYSIS AUTO W/SCOPE: CPT | Performed by: PHYSICIAN ASSISTANT

## 2023-02-04 PROCEDURE — 80048 BASIC METABOLIC PNL TOTAL CA: CPT | Performed by: FAMILY MEDICINE

## 2023-02-04 PROCEDURE — 250N000013 HC RX MED GY IP 250 OP 250 PS 637: Performed by: FAMILY MEDICINE

## 2023-02-04 PROCEDURE — 96375 TX/PRO/DX INJ NEW DRUG ADDON: CPT

## 2023-02-04 PROCEDURE — 74176 CT ABD & PELVIS W/O CONTRAST: CPT

## 2023-02-04 PROCEDURE — 86140 C-REACTIVE PROTEIN: CPT | Performed by: FAMILY MEDICINE

## 2023-02-04 RX ORDER — HYDROCODONE BITARTRATE AND ACETAMINOPHEN 5; 325 MG/1; MG/1
1 TABLET ORAL EVERY 6 HOURS PRN
Qty: 6 TABLET | Refills: 0 | Status: SHIPPED | OUTPATIENT
Start: 2023-02-04 | End: 2023-02-07

## 2023-02-04 RX ORDER — ONDANSETRON 4 MG/1
4 TABLET, ORALLY DISINTEGRATING ORAL EVERY 6 HOURS PRN
Qty: 20 TABLET | Refills: 0 | Status: SHIPPED | OUTPATIENT
Start: 2023-02-04 | End: 2023-02-07

## 2023-02-04 RX ORDER — ACETAMINOPHEN 325 MG/1
650 TABLET ORAL ONCE
Status: COMPLETED | OUTPATIENT
Start: 2023-02-04 | End: 2023-02-04

## 2023-02-04 RX ORDER — KETOROLAC TROMETHAMINE 15 MG/ML
15 INJECTION, SOLUTION INTRAMUSCULAR; INTRAVENOUS ONCE
Status: COMPLETED | OUTPATIENT
Start: 2023-02-04 | End: 2023-02-04

## 2023-02-04 RX ORDER — ONDANSETRON 2 MG/ML
4 INJECTION INTRAMUSCULAR; INTRAVENOUS EVERY 30 MIN PRN
Status: DISCONTINUED | OUTPATIENT
Start: 2023-02-04 | End: 2023-02-04 | Stop reason: HOSPADM

## 2023-02-04 RX ADMIN — ONDANSETRON 4 MG: 2 INJECTION INTRAMUSCULAR; INTRAVENOUS at 19:28

## 2023-02-04 RX ADMIN — KETOROLAC TROMETHAMINE 15 MG: 15 INJECTION, SOLUTION INTRAMUSCULAR; INTRAVENOUS at 19:28

## 2023-02-04 RX ADMIN — ACETAMINOPHEN 650 MG: 325 TABLET ORAL at 19:29

## 2023-02-04 ASSESSMENT — ENCOUNTER SYMPTOMS
ABDOMINAL PAIN: 1
DIARRHEA: 0
VOMITING: 0
DYSURIA: 0
DIFFICULTY URINATING: 0
FLANK PAIN: 1
FREQUENCY: 0
NAUSEA: 1

## 2023-02-04 ASSESSMENT — ACTIVITIES OF DAILY LIVING (ADL): ADLS_ACUITY_SCORE: 33

## 2023-02-05 ENCOUNTER — HEALTH MAINTENANCE LETTER (OUTPATIENT)
Age: 43
End: 2023-02-05

## 2023-02-05 NOTE — ED TRIAGE NOTES
Pt reports right flank pain that started Friday at 0130 as RLQ abdominal pain. + nausea. Pt reports previous hx of kidney stones and this is worse pain. ABC's intact.      Triage Assessment     Row Name 02/04/23 [id number]       Triage Assessment (Adult)    Airway WDL WDL       Respiratory WDL    Respiratory WDL WDL       Skin Circulation/Temperature WDL    Skin Circulation/Temperature WDL WDL       Cardiac WDL    Cardiac WDL WDL       Peripheral/Neurovascular WDL    Peripheral Neurovascular WDL WDL       Cognitive/Neuro/Behavioral WDL    Cognitive/Neuro/Behavioral WDL WDL

## 2023-02-05 NOTE — ED PROVIDER NOTES
EMERGENCY DEPARTMENT ENCOUNTER      NAME: Viktoriya Lovelace  AGE: 42 year old female  YOB: 1980  MRN: [id number]  EVALUATION DATE & TIME: 2/4/2023  6:57 PM    PCP: Liz Valente    ED PROVIDER: Candido Gutierrez M.D.    Chief Complaint   Patient presents with     Flank Pain     Right          FINAL IMPRESSION:  1. Abdominal pain, right lower quadrant        ED COURSE & MEDICAL DECISION MAKING:    Pertinent Labs & Imaging studies independently interpreted by me. (See chart for details)  6:58 PM Patient seen and examined, external records reviewed show prior emergency department visit in 2017 with right lower quadrant pain with negative work-up.  Differential diagnosis includes but not limited to gastritis, cholecystitis, pancreatitis, colitis, enteritis, diverticulitis, urinary tract infection, myocardial infarction, pneumonia appendicitis, AAA, cholelithiasis, ischemic bowel.  Patient with right lower quadrant pain and right flank pain, concerned about possible kidney stone as she has had this in the past.  Right CVA tenderness and right lower quadrant tenderness on exam.  Urinalysis independently interpreted by me with positive blood on dipstick and moderate bacteria.  Remaining labs and CT scan are pending.  7:56 PM labs independently interpreted by me demonstrate normal CBC, normal basic panel.  CT scan independently interpreted by me does not demonstrate any acute findings.  Etiology of pains today is not found, pain improved after Toradol.  Stable for discharge with outpatient follow-up    At the conclusion of the encounter I discussed the results of all of the tests and the disposition. The questions were answered. The patient or family acknowledged understanding and was agreeable with the care plan.     Medical Decision Making    History:    Supplemental history from: N/A    External Record(s) reviewed: Documented in chart, if applicable.    Work Up:    Chart documentation includes  differential considered and any EKGs or imaging independently interpreted by provider, where specified.    In additional to work up documented, I considered the following work up: Documented in chart, if applicable.    External consultation:    Discussion of management with another provider: Documented in chart, if applicable    Complicating factors:    Care impacted by chronic illness: N/A    Care affected by social determinants of health: N/A    Disposition considerations: Discharge. I prescribed additional prescription strength medication(s) as charted. I considered admission, but discharged patient after significant clinical improvement.      MEDICATIONS GIVEN IN THE EMERGENCY:  Medications   ondansetron (ZOFRAN) injection 4 mg (4 mg Intravenous Given 2/4/23 1928)   acetaminophen (TYLENOL) tablet 650 mg (650 mg Oral Given 2/4/23 1929)   ketorolac (TORADOL) injection 15 mg (15 mg Intravenous Given 2/4/23 1928)       NEW PRESCRIPTIONS STARTED AT TODAY'S ER VISIT  New Prescriptions    HYDROCODONE-ACETAMINOPHEN (NORCO) 5-325 MG TABLET    Take 1 tablet by mouth every 6 hours as needed for severe pain (7-10)    ONDANSETRON (ZOFRAN ODT) 4 MG ODT TAB    Take 1 tablet (4 mg) by mouth every 6 hours as needed for nausea       =================================================================    HPI    Patient information was obtained from: patient       Viktoriya Lovelace is a 42 year old female with a pertinent history of nephrolithiases and s/p tubal ligation who presents to this ED by walk-in for evaluation of flank pain.    The patient reports waking up with right lower quadrant abdominal pain at ~0130 yesterday morning 2/3/23. She states that the pain moved to her right flank throughout the day yesterday. Her pain has remained constant since with occasional episodes of sharp shooting pain under her right ribs. She has been taking tylenol and Ibuprofen for her pain with minimal relief. Patient endorses some nausea as  well. She denies any vomiting, diarrhea, or urinary problems. Patient denies additional medical concerns or complaints at this time.  She denies any medical problems, daily medications, or allergies.    The patient notes that she has required stent placement for one of her previous kidney stones.    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      REVIEW OF SYSTEMS   Review of Systems   Gastrointestinal: Positive for abdominal pain (RLQ) and nausea. Negative for diarrhea and vomiting.   Genitourinary: Positive for flank pain (right side). Negative for difficulty urinating, dysuria, frequency and urgency.      All other systems reviewed and negative    PAST MEDICAL HISTORY:  History reviewed. No pertinent past medical history.    PAST SURGICAL HISTORY:  Past Surgical History:   Procedure Laterality Date     OTHER SURGICAL HISTORY      Cosmetic Ear Surgery     TUBAL LIGATION         CURRENT MEDICATIONS:    Current Facility-Administered Medications   Medication     ondansetron (ZOFRAN) injection 4 mg     Current Outpatient Medications   Medication     HYDROcodone-acetaminophen (NORCO) 5-325 MG tablet     ondansetron (ZOFRAN ODT) 4 MG ODT tab     cyclobenzaprine (FLEXERIL) 10 MG tablet     hydroxychloroquine (PLAQUENIL) 200 mg tablet     IBUPROFEN IB ORAL     naproxen (NAPROSYN) 500 MG tablet     rizatriptan (MAXALT) 10 MG tablet       ALLERGIES:  No Known Allergies    FAMILY HISTORY:  Family History   Problem Relation Age of Onset     Melanoma Mother         on face     Diabetes Type 2  Father      Coronary Artery Disease Father      Acute Myocardial Infarction Father        SOCIAL HISTORY:   Social History     Socioeconomic History     Marital status:    Tobacco Use     Smoking status: Former     Types: Cigarettes     Quit date: 2012     Years since quittin.1     Smokeless tobacco: Never   Substance and Sexual Activity     Alcohol use: No     Drug use: No       VITALS:  /67   Pulse 70   Temp  "99.3  F (37.4  C) (Oral)   Resp 18   Ht 1.753 m (5' 9\")   Wt 81.6 kg (180 lb)   LMP 01/19/2023 (Approximate)   SpO2 100%   BMI 26.58 kg/m      PHYSICAL EXAM:  Physical Exam  Vitals and nursing note reviewed.   Constitutional:       Appearance: Normal appearance.   HENT:      Head: Normocephalic and atraumatic.      Right Ear: External ear normal.      Left Ear: External ear normal.      Nose: Nose normal.      Mouth/Throat:      Mouth: Mucous membranes are moist.   Eyes:      Extraocular Movements: Extraocular movements intact.      Conjunctiva/sclera: Conjunctivae normal.      Pupils: Pupils are equal, round, and reactive to light.   Cardiovascular:      Rate and Rhythm: Normal rate and regular rhythm.   Pulmonary:      Effort: Pulmonary effort is normal.      Breath sounds: Normal breath sounds. No wheezing or rales.   Abdominal:      General: Abdomen is flat. There is no distension.      Palpations: Abdomen is soft.      Tenderness: There is abdominal tenderness in the right lower quadrant. There is right CVA tenderness. There is no guarding.   Musculoskeletal:         General: Normal range of motion.      Cervical back: Normal range of motion and neck supple.      Right lower leg: No edema.      Left lower leg: No edema.   Lymphadenopathy:      Cervical: No cervical adenopathy.   Skin:     General: Skin is warm and dry.   Neurological:      General: No focal deficit present.      Mental Status: She is alert and oriented to person, place, and time. Mental status is at baseline.      Comments: No gross focal neurologic deficits   Psychiatric:         Mood and Affect: Mood normal.         Behavior: Behavior normal.         Thought Content: Thought content normal.          LAB:  All pertinent labs reviewed and interpreted.  Results for orders placed or performed during the hospital encounter of 02/04/23   Abd/pelvis CT no contrast - Stone Protocol    Impression    IMPRESSION:   1.  No renal calculi or " hydronephrosis.  2.  Normal appendix.  3.  Left colonic diverticulosis.  4.  No findings to account for the patient's symptoms.     UA with Microscopic reflex to Culture    Specimen: Urine, Clean Catch   Result Value Ref Range    Color Urine Colorless Colorless, Straw, Light Yellow, Yellow    Appearance Urine Clear Clear    Glucose Urine Negative Negative mg/dL    Bilirubin Urine Negative Negative    Ketones Urine Negative Negative mg/dL    Specific Gravity Urine 1.005 1.001 - 1.030    Blood Urine 0.03 mg/dL (A) Negative    pH Urine 5.5 5.0 - 7.0    Protein Albumin Urine Negative Negative mg/dL    Urobilinogen Urine <2.0 <2.0 mg/dL    Nitrite Urine Negative Negative    Leukocyte Esterase Urine Negative Negative    Bacteria Urine Moderate (A) None Seen /HPF    RBC Urine 1 <=2 /HPF    WBC Urine 1 <=5 /HPF    Squamous Epithelials Urine 1 <=1 /HPF   Basic metabolic panel   Result Value Ref Range    Sodium 140 136 - 145 mmol/L    Potassium 3.5 3.5 - 5.0 mmol/L    Chloride 108 (H) 98 - 107 mmol/L    Carbon Dioxide (CO2) 22 22 - 31 mmol/L    Anion Gap 10 5 - 18 mmol/L    Urea Nitrogen 6 (L) 8 - 22 mg/dL    Creatinine 0.77 0.60 - 1.10 mg/dL    Calcium 9.0 8.5 - 10.5 mg/dL    Glucose 91 70 - 125 mg/dL    GFR Estimate >90 >60 mL/min/1.73m2   CRP inflammation   Result Value Ref Range    CRP <0.1 0.0 - <0.8 mg/dL   CBC with platelets and differential   Result Value Ref Range    WBC Count 6.7 4.0 - 11.0 10e3/uL    RBC Count 4.66 3.80 - 5.20 10e6/uL    Hemoglobin 12.5 11.7 - 15.7 g/dL    Hematocrit 38.2 35.0 - 47.0 %    MCV 82 78 - 100 fL    MCH 26.8 26.5 - 33.0 pg    MCHC 32.7 31.5 - 36.5 g/dL    RDW 12.8 10.0 - 15.0 %    Platelet Count 255 150 - 450 10e3/uL    % Neutrophils 63 %    % Lymphocytes 29 %    % Monocytes 6 %    % Eosinophils 2 %    % Basophils 0 %    % Immature Granulocytes 0 %    NRBCs per 100 WBC 0 <1 /100    Absolute Neutrophils 4.2 1.6 - 8.3 10e3/uL    Absolute Lymphocytes 1.9 0.8 - 5.3 10e3/uL    Absolute  Monocytes 0.4 0.0 - 1.3 10e3/uL    Absolute Eosinophils 0.1 0.0 - 0.7 10e3/uL    Absolute Basophils 0.0 0.0 - 0.2 10e3/uL    Absolute Immature Granulocytes 0.0 <=0.4 10e3/uL    Absolute NRBCs 0.0 10e3/uL       RADIOLOGY:  Reviewed all pertinent imaging. Please see official radiology report.  Abd/pelvis CT no contrast - Stone Protocol   Final Result   IMPRESSION:    1.  No renal calculi or hydronephrosis.   2.  Normal appendix.   3.  Left colonic diverticulosis.   4.  No findings to account for the patient's symptoms.                 I, Radha Peter, am serving as a scribe to document services personally performed by Dr. Gutierrez based on my observation and the provider's statements to me. I, Candido Gutierrez MD attest that Radha Green is acting in a scribe capacity, has observed my performance of the services and has documented them in accordance with my direction.    Candido Gutierrez M.D.  Emergency Medicine  [facility] EMERGENCY ROOM  [address] [id number] [phone]  Dept: [phone]     Candido Gutierrez MD  02/04/23 5540

## 2023-02-20 ENCOUNTER — OFFICE VISIT (OUTPATIENT)
Dept: URGENT CARE | Facility: URGENT CARE | Age: 43
End: 2023-02-20
Payer: OTHER GOVERNMENT

## 2023-02-20 VITALS
RESPIRATION RATE: 14 BRPM | HEIGHT: 69 IN | WEIGHT: 180 LBS | TEMPERATURE: 98.4 F | DIASTOLIC BLOOD PRESSURE: 84 MMHG | HEART RATE: 98 BPM | BODY MASS INDEX: 26.66 KG/M2 | SYSTOLIC BLOOD PRESSURE: 121 MMHG | OXYGEN SATURATION: 95 %

## 2023-02-20 DIAGNOSIS — M54.50 ACUTE BILATERAL LOW BACK PAIN WITHOUT SCIATICA: Primary | ICD-10-CM

## 2023-02-20 PROCEDURE — 99203 OFFICE O/P NEW LOW 30 MIN: CPT | Performed by: STUDENT IN AN ORGANIZED HEALTH CARE EDUCATION/TRAINING PROGRAM

## 2023-02-20 RX ORDER — PREDNISONE 20 MG/1
40 TABLET ORAL DAILY
Qty: 10 TABLET | Refills: 0 | Status: SHIPPED | OUTPATIENT
Start: 2023-02-20 | End: 2023-02-25

## 2023-02-20 RX ORDER — CYCLOBENZAPRINE HCL 10 MG
10 TABLET ORAL 3 TIMES DAILY PRN
Qty: 15 TABLET | Refills: 0 | Status: SHIPPED | OUTPATIENT
Start: 2023-02-20 | End: 2023-07-11

## 2023-02-20 ASSESSMENT — PAIN SCALES - GENERAL: PAINLEVEL: WORST PAIN (10)

## 2023-02-20 NOTE — PROGRESS NOTES
ASSESSMENT & PLAN:   Diagnoses and all orders for this visit:  Acute bilateral low back pain without sciatica  -     predniSONE (DELTASONE) 20 MG tablet; Take 2 tablets (40 mg) by mouth daily for 5 days  -     cyclobenzaprine (FLEXERIL) 10 MG tablet; Take 1 tablet (10 mg) by mouth 3 times daily as needed for muscle spasms    Atraumatic low back pain without radicular or red flag symptoms. Likely muscle spasm. Start prednisone burst, Flexeril as needed, OTC analgesics. Patient took ibuprofen 600 mg 2 hours prior to presenting at , so toradol deferred. Patient provided with maximum dosing of tylenol/ibuprofen. Recommend heat, walking, light stretching as tolerated. Discussed red flag symptoms. Follow-up with PCP if symptoms persist.    Return in about 2 weeks (around 3/6/2023) for follow-up with PCP if symptoms persist.    Patient Instructions   Take acetaminophen and/or ibuprofen as needed for pain  Acetaminophen (tylenol)  325-650 mg every 4-6 hours as needed OR 1000 mg every 6 hours as needed. Maximum dose: 4000 mg/day  Ibuprofen (advil, motrin)  200-400 mg every 4-6 hours as needed -800 mg every 6-8 hours as needed. Maximum dose: 3200 mg/day       At the end of the encounter, I discussed results, diagnosis, medications. Discussed red flags for immediate return to clinic/ER, as well as indications for follow up if no improvement. Patient and/or caregiver understood and agreed to plan. Patient was stable for discharge.    ------------------------------------------------------------------------  SUBJECTIVE  Patient presents with:  Urgent Care: Patient states she pulled her pants up this morning and felt/heard her lower back on the right side pop. Patient states she cannot stand up straight, or sit. Patient states she took Advil at 4pm with no relief. Patient states she also tried a heating pad today and Icy hot roll on as well and non provided any relief. 10/10 pain.     HPI  Viktoriya Lovelace is a(n) 42 year  "old female presenting to clinic today for low back pain that began today. She was pulling up her pants when she felt and heard a pop in her low back. She has midline low back pain. Pain started to radiate down left thigh. No leg numbness, tingling, weakness. No saddle anesthesia, incontinence, difficulty using bathroom. She took Ibuprofen 600 mg 2 hours prior. She has thrown her back out a few times in the past and this feels similar to those episodes.    Review of Systems    Current Outpatient Medications   Medication Sig Dispense Refill     cyclobenzaprine (FLEXERIL) 10 MG tablet Take 1 tablet (10 mg) by mouth 3 times daily as needed for muscle spasms 15 tablet 0     IBUPROFEN IB ORAL [IBUPROFEN IB ORAL] Take 600 mg by mouth.       predniSONE (DELTASONE) 20 MG tablet Take 2 tablets (40 mg) by mouth daily for 5 days 10 tablet 0     cyclobenzaprine (FLEXERIL) 10 MG tablet [CYCLOBENZAPRINE (FLEXERIL) 10 MG TABLET] Take 1 tablet (10 mg total) by mouth 3 (three) times a day as needed for muscle spasms. 30 tablet 0     hydroxychloroquine (PLAQUENIL) 200 mg tablet [HYDROXYCHLOROQUINE (PLAQUENIL) 200 MG TABLET] TK 1 T PO BID WITH FOOD OR MILK  3     naproxen (NAPROSYN) 500 MG tablet [NAPROXEN (NAPROSYN) 500 MG TABLET] Take 1 tablet (500 mg total) by mouth 2 (two) times a day with meals. During menstraul cycle or during migrain HA 60 tablet 2     rizatriptan (MAXALT) 10 MG tablet [RIZATRIPTAN (MAXALT) 10 MG TABLET] TAKE 1 TABLET BY MOUTH AT ONSET, MAY REPEAT IN 2 HOURS AS NEEDED( MAX OF 30MG PER DAY) 10 tablet 5     Problem List:  2023-02: Nicotine dependence in remission  2023-02: Dysmenorrhea  2015-12: Mucous retention cyst of maxillary sinus  2015-12: Migraine  Abnormal Pap Smear Of Cervix  Anxiety  Depression    No Known Allergies      OBJECTIVE  Vitals:    02/20/23 1720   BP: 121/84   Pulse: 98   Resp: 14   Temp: 98.4  F (36.9  C)   TempSrc: Tympanic   SpO2: 95%   Weight: 81.6 kg (180 lb)   Height: 1.753 m (5' 9\") "     Physical Exam   GENERAL: healthy, alert. Patient kneeling on floor and bent over exam table.  MSK: no spinous process tenderness. Tender with palpation of lumbar paraspinal muscles bilaterally. Tender with palpation of lumbar muscles on left. 5/5 knee flexion and extension. Sensation of lower extremities intact and symmetric. Ambulates bending over.     No results found for any visits on 02/20/23.
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
## 2023-02-21 NOTE — PATIENT INSTRUCTIONS
Take acetaminophen and/or ibuprofen as needed for pain  Acetaminophen (tylenol)  325-650 mg every 4-6 hours as needed OR 1000 mg every 6 hours as needed. Maximum dose: 4000 mg/day  Ibuprofen (advil, motrin)  200-400 mg every 4-6 hours as needed -800 mg every 6-8 hours as needed. Maximum dose: 3200 mg/day

## 2023-05-04 ENCOUNTER — TELEPHONE (OUTPATIENT)
Dept: FAMILY MEDICINE | Facility: CLINIC | Age: 43
End: 2023-05-04

## 2023-05-04 ENCOUNTER — OFFICE VISIT (OUTPATIENT)
Dept: FAMILY MEDICINE | Facility: CLINIC | Age: 43
End: 2023-05-04
Payer: OTHER GOVERNMENT

## 2023-05-04 VITALS
OXYGEN SATURATION: 100 % | TEMPERATURE: 98.2 F | HEART RATE: 92 BPM | BODY MASS INDEX: 27.77 KG/M2 | HEIGHT: 69 IN | SYSTOLIC BLOOD PRESSURE: 120 MMHG | WEIGHT: 187.5 LBS | DIASTOLIC BLOOD PRESSURE: 80 MMHG

## 2023-05-04 DIAGNOSIS — Z12.31 ENCOUNTER FOR SCREENING MAMMOGRAM FOR BREAST CANCER: Primary | ICD-10-CM

## 2023-05-04 DIAGNOSIS — R53.83 OTHER FATIGUE: ICD-10-CM

## 2023-05-04 DIAGNOSIS — Z00.00 HEALTHCARE MAINTENANCE: ICD-10-CM

## 2023-05-04 DIAGNOSIS — F32.1 CURRENT MODERATE EPISODE OF MAJOR DEPRESSIVE DISORDER WITHOUT PRIOR EPISODE (H): Primary | ICD-10-CM

## 2023-05-04 DIAGNOSIS — Z12.31 ENCOUNTER FOR SCREENING MAMMOGRAM FOR BREAST CANCER: ICD-10-CM

## 2023-05-04 LAB
T4 FREE SERPL-MCNC: 1.07 NG/DL (ref 0.9–1.7)
TSH SERPL DL<=0.005 MIU/L-ACNC: 4.03 UIU/ML (ref 0.3–4.2)

## 2023-05-04 PROCEDURE — 99214 OFFICE O/P EST MOD 30 MIN: CPT | Performed by: STUDENT IN AN ORGANIZED HEALTH CARE EDUCATION/TRAINING PROGRAM

## 2023-05-04 PROCEDURE — 84439 ASSAY OF FREE THYROXINE: CPT | Performed by: STUDENT IN AN ORGANIZED HEALTH CARE EDUCATION/TRAINING PROGRAM

## 2023-05-04 PROCEDURE — 84443 ASSAY THYROID STIM HORMONE: CPT | Performed by: STUDENT IN AN ORGANIZED HEALTH CARE EDUCATION/TRAINING PROGRAM

## 2023-05-04 PROCEDURE — 82306 VITAMIN D 25 HYDROXY: CPT | Performed by: STUDENT IN AN ORGANIZED HEALTH CARE EDUCATION/TRAINING PROGRAM

## 2023-05-04 PROCEDURE — 36415 COLL VENOUS BLD VENIPUNCTURE: CPT | Performed by: STUDENT IN AN ORGANIZED HEALTH CARE EDUCATION/TRAINING PROGRAM

## 2023-05-04 RX ORDER — CITALOPRAM HYDROBROMIDE 10 MG/1
10 TABLET ORAL DAILY
Qty: 30 TABLET | Refills: 1 | Status: SHIPPED | OUTPATIENT
Start: 2023-05-04 | End: 2023-06-09

## 2023-05-04 ASSESSMENT — PAIN SCALES - GENERAL: PAINLEVEL: NO PAIN (0)

## 2023-05-04 NOTE — TELEPHONE ENCOUNTER
Reason for call:  Other     Patient called regarding (reason for call): Referral    Additional comments: Imaging called to schedule pt's mammogram and was told the referral needs to be for a diagnostic and ultrasound, not a screening. Please send a new referral.    Phone number to reach patient:  Cell number on file:    Telephone Information:   Mobile [phone]       Best Time:  Any

## 2023-05-04 NOTE — TELEPHONE ENCOUNTER
I placed order for diagnostic mammogram.  I do not know what to place for ultrasound as I do not know what her area of concern is in her breast.  Radiologist can order this as appropriate.    Boom Waters MD
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
## 2023-05-05 LAB — DEPRECATED CALCIDIOL+CALCIFEROL SERPL-MC: 16 UG/L (ref 20–75)

## 2023-05-08 NOTE — RESULT ENCOUNTER NOTE
Dulce  Your results from your recent clinic visit show:  Your Vitamin D is mildly low. You could consider replacing this with an OTC supplement  Your thyroid is normal (TSH).    If you have more questions please call the clinic at [phone] or send me a [facility] [employer] message    Dr. Boom Waters

## 2023-05-25 ENCOUNTER — APPOINTMENT (OUTPATIENT)
Dept: CT IMAGING | Facility: HOSPITAL | Age: 43
End: 2023-05-25
Attending: EMERGENCY MEDICINE
Payer: OTHER GOVERNMENT

## 2023-05-25 ENCOUNTER — HOSPITAL ENCOUNTER (EMERGENCY)
Facility: HOSPITAL | Age: 43
Discharge: HOME | End: 2023-05-25
Attending: EMERGENCY MEDICINE | Admitting: EMERGENCY MEDICINE
Payer: OTHER GOVERNMENT

## 2023-05-25 VITALS
BODY MASS INDEX: 26.66 KG/M2 | SYSTOLIC BLOOD PRESSURE: 119 MMHG | OXYGEN SATURATION: 100 % | TEMPERATURE: 98.8 F | WEIGHT: 180 LBS | HEIGHT: 69 IN | RESPIRATION RATE: 18 BRPM | HEART RATE: 72 BPM | DIASTOLIC BLOOD PRESSURE: 69 MMHG

## 2023-05-25 DIAGNOSIS — R10.9 RIGHT FLANK PAIN: ICD-10-CM

## 2023-05-25 LAB
ALBUMIN SERPL BCG-MCNC: 4.4 G/DL (ref 3.5–5.2)
ALBUMIN UR-MCNC: NEGATIVE MG/DL
ALP SERPL-CCNC: 72 U/L (ref 35–104)
ALT SERPL W P-5'-P-CCNC: 11 U/L (ref 10–35)
ANION GAP SERPL CALCULATED.3IONS-SCNC: 11 MMOL/L (ref 7–15)
APPEARANCE UR: CLEAR
AST SERPL W P-5'-P-CCNC: 17 U/L (ref 10–35)
BACTERIA #/AREA URNS HPF: ABNORMAL /HPF
BASOPHILS # BLD AUTO: 0 10E3/UL (ref 0–0.2)
BASOPHILS NFR BLD AUTO: 1 %
BILIRUB SERPL-MCNC: 0.4 MG/DL
BILIRUB UR QL STRIP: NEGATIVE
BUN SERPL-MCNC: 9 MG/DL (ref 6–20)
CALCIUM SERPL-MCNC: 9 MG/DL (ref 8.6–10)
CHLORIDE SERPL-SCNC: 107 MMOL/L (ref 98–107)
COLOR UR AUTO: COLORLESS
CREAT SERPL-MCNC: 0.87 MG/DL (ref 0.51–0.95)
DEPRECATED HCO3 PLAS-SCNC: 22 MMOL/L (ref 22–29)
EOSINOPHIL # BLD AUTO: 0.1 10E3/UL (ref 0–0.7)
EOSINOPHIL NFR BLD AUTO: 3 %
ERYTHROCYTE [DISTWIDTH] IN BLOOD BY AUTOMATED COUNT: 12.7 % (ref 10–15)
GFR SERPL CREATININE-BSD FRML MDRD: 85 ML/MIN/1.73M2
GLUCOSE SERPL-MCNC: 94 MG/DL (ref 70–99)
GLUCOSE UR STRIP-MCNC: NEGATIVE MG/DL
HCG INTACT+B SERPL-ACNC: <1 MIU/ML
HCT VFR BLD AUTO: 37.5 % (ref 35–47)
HGB BLD-MCNC: 11.7 G/DL (ref 11.7–15.7)
HGB UR QL STRIP: NEGATIVE
IMM GRANULOCYTES # BLD: 0 10E3/UL
IMM GRANULOCYTES NFR BLD: 0 %
KETONES UR STRIP-MCNC: NEGATIVE MG/DL
LEUKOCYTE ESTERASE UR QL STRIP: NEGATIVE
LYMPHOCYTES # BLD AUTO: 1.7 10E3/UL (ref 0.8–5.3)
LYMPHOCYTES NFR BLD AUTO: 44 %
MCH RBC QN AUTO: 26.3 PG (ref 26.5–33)
MCHC RBC AUTO-ENTMCNC: 31.2 G/DL (ref 31.5–36.5)
MCV RBC AUTO: 84 FL (ref 78–100)
MONOCYTES # BLD AUTO: 0.2 10E3/UL (ref 0–1.3)
MONOCYTES NFR BLD AUTO: 6 %
MUCOUS THREADS #/AREA URNS LPF: PRESENT /LPF
NEUTROPHILS # BLD AUTO: 1.8 10E3/UL (ref 1.6–8.3)
NEUTROPHILS NFR BLD AUTO: 46 %
NITRATE UR QL: NEGATIVE
NRBC # BLD AUTO: 0 10E3/UL
NRBC BLD AUTO-RTO: 0 /100
PH UR STRIP: 6 [PH] (ref 5–7)
PLATELET # BLD AUTO: 253 10E3/UL (ref 150–450)
POTASSIUM SERPL-SCNC: 4 MMOL/L (ref 3.4–5.3)
PROT SERPL-MCNC: 7.1 G/DL (ref 6.4–8.3)
RBC # BLD AUTO: 4.45 10E6/UL (ref 3.8–5.2)
RBC URINE: 1 /HPF
SODIUM SERPL-SCNC: 140 MMOL/L (ref 136–145)
SP GR UR STRIP: 1.01 (ref 1–1.03)
SQUAMOUS EPITHELIAL: <1 /HPF
UROBILINOGEN UR STRIP-MCNC: <2 MG/DL
WBC # BLD AUTO: 3.9 10E3/UL (ref 4–11)
WBC URINE: 0 /HPF

## 2023-05-25 PROCEDURE — 74176 CT ABD & PELVIS W/O CONTRAST: CPT

## 2023-05-25 PROCEDURE — 96375 TX/PRO/DX INJ NEW DRUG ADDON: CPT

## 2023-05-25 PROCEDURE — 84702 CHORIONIC GONADOTROPIN TEST: CPT | Performed by: EMERGENCY MEDICINE

## 2023-05-25 PROCEDURE — 82310 ASSAY OF CALCIUM: CPT | Performed by: EMERGENCY MEDICINE

## 2023-05-25 PROCEDURE — 258N000003 HC RX IP 258 OP 636: Performed by: EMERGENCY MEDICINE

## 2023-05-25 PROCEDURE — 36415 COLL VENOUS BLD VENIPUNCTURE: CPT | Performed by: EMERGENCY MEDICINE

## 2023-05-25 PROCEDURE — 96361 HYDRATE IV INFUSION ADD-ON: CPT

## 2023-05-25 PROCEDURE — 99285 EMERGENCY DEPT VISIT HI MDM: CPT | Mod: 25

## 2023-05-25 PROCEDURE — 96374 THER/PROPH/DIAG INJ IV PUSH: CPT

## 2023-05-25 PROCEDURE — 85004 AUTOMATED DIFF WBC COUNT: CPT | Performed by: EMERGENCY MEDICINE

## 2023-05-25 PROCEDURE — 81001 URINALYSIS AUTO W/SCOPE: CPT | Performed by: EMERGENCY MEDICINE

## 2023-05-25 PROCEDURE — 96376 TX/PRO/DX INJ SAME DRUG ADON: CPT

## 2023-05-25 PROCEDURE — 250N000011 HC RX IP 250 OP 636: Performed by: EMERGENCY MEDICINE

## 2023-05-25 RX ORDER — ONDANSETRON 2 MG/ML
4 INJECTION INTRAMUSCULAR; INTRAVENOUS ONCE
Status: COMPLETED | OUTPATIENT
Start: 2023-05-25 | End: 2023-05-25

## 2023-05-25 RX ORDER — HYDROMORPHONE HYDROCHLORIDE 1 MG/ML
0.5 INJECTION, SOLUTION INTRAMUSCULAR; INTRAVENOUS; SUBCUTANEOUS ONCE
Status: COMPLETED | OUTPATIENT
Start: 2023-05-25 | End: 2023-05-25

## 2023-05-25 RX ORDER — ONDANSETRON 4 MG/1
4 TABLET, ORALLY DISINTEGRATING ORAL EVERY 8 HOURS PRN
Qty: 10 TABLET | Refills: 0 | Status: SHIPPED | OUTPATIENT
Start: 2023-05-25 | End: 2023-05-28

## 2023-05-25 RX ORDER — KETOROLAC TROMETHAMINE 15 MG/ML
15 INJECTION, SOLUTION INTRAMUSCULAR; INTRAVENOUS ONCE
Status: COMPLETED | OUTPATIENT
Start: 2023-05-25 | End: 2023-05-25

## 2023-05-25 RX ADMIN — SODIUM CHLORIDE 1000 ML: 9 INJECTION, SOLUTION INTRAVENOUS at 13:53

## 2023-05-25 RX ADMIN — ONDANSETRON 4 MG: 2 INJECTION INTRAMUSCULAR; INTRAVENOUS at 13:51

## 2023-05-25 RX ADMIN — HYDROMORPHONE HYDROCHLORIDE 0.5 MG: 1 INJECTION, SOLUTION INTRAMUSCULAR; INTRAVENOUS; SUBCUTANEOUS at 15:40

## 2023-05-25 RX ADMIN — KETOROLAC TROMETHAMINE 15 MG: 15 INJECTION, SOLUTION INTRAMUSCULAR; INTRAVENOUS at 13:52

## 2023-05-25 RX ADMIN — ONDANSETRON 4 MG: 2 INJECTION INTRAMUSCULAR; INTRAVENOUS at 15:41

## 2023-05-25 ASSESSMENT — ACTIVITIES OF DAILY LIVING (ADL)
ADLS_ACUITY_SCORE: 33
ADLS_ACUITY_SCORE: 35

## 2023-05-25 NOTE — DISCHARGE INSTRUCTIONS
Please follow-up with your primary care doctor next week for reevaluation.  Can use Tylenol and ibuprofen for pain at home.  Can use Zofran for nausea.  Discussed that at this point we do not know the exact cause of your pain.  Possible could be musculoskeletal or potentially could have passed a stone and still have some irritation there.     Return to the ER if you develop severe uncontrolled pain, abdominal pain, fever, repetitive vomiting, inability to keep anything down, numbness, tingling, weakness, loss of bowel or bladder control, numbness in the groin area, or any new or worsening concerns.

## 2023-05-25 NOTE — ED TRIAGE NOTES
Pt presents with right flank pain that began as a dull ache last evening and has increased to severe pain. Pain was intermittent but after using the bathroom here, pain is now constant. Pt has hx of kidney stones.     Triage Assessment     Row Name 05/25/23 1228       Triage Assessment (Adult)    Airway WDL WDL       Respiratory WDL    Respiratory WDL WDL       Skin Circulation/Temperature WDL    Skin Circulation/Temperature WDL WDL       Cardiac WDL    Cardiac WDL WDL       Peripheral/Neurovascular WDL    Peripheral Neurovascular WDL WDL       Cognitive/Neuro/Behavioral WDL    Cognitive/Neuro/Behavioral WDL WDL

## 2023-05-25 NOTE — ED NOTES
Patient  reviewed discharge.  Discussed printed discharge information.  Follow-up appts reviewed.   What s/s warrant return to the ER.    Importance of social distancing, good hand hygiene, and proper primary care follow-up to maintain health.

## 2023-05-25 NOTE — ED PROVIDER NOTES
EMERGENCY DEPARTMENT ENCOUNTER      NAME: Viktoriya Lovelace  AGE: 42 year old female  YOB: 1980  MRN: [id number]  EVALUATION DATE & TIME: 5/25/2023 12:30 PM    PCP: Boom Mg    ED PROVIDER: Carol Brooks MD      Chief Complaint   Patient presents with     Flank Pain     right         FINAL IMPRESSION:  1. Right flank pain          ED COURSE & MEDICAL DECISION MAKING:    Pertinent Labs & Imaging studies reviewed. (See chart for details)    1:00 PM I introduced myself to the patient, obtained patient history, performed a physical exam, and discussed plan for ED workup including potential diagnostic laboratory/imaging studies and interventions.    42 year old female with history of kidney stones who presents to the Emergency Department for evaluation of right flank pain.  On exam, patient is in no acute distress.  Her vital signs here are within normal limits.  No CVA tenderness.  No significant abdominal tenderness.  Reports her pain is in her right flank and does not really radiate much to the abdomen.  Does feel similar to prior kidney stones.  Differential diagnosis includes but is not limited to musculoskeletal, kidney stone, pyelonephritis, UTI, less likely any aortic pathology as she is an otherwise healthy 42-year-old, etc.  She has no other signs or symptoms of spinal cord compression.  Laboratory studies and urinalysis obtained.  She was given IV Zofran as well as IV Toradol and IV fluids.  Later given some IV Dilaudid as well.  Was feeling improved on reevaluation.      CT abdomen pelvis without contrast was obtained and does not reveal any evidence of Ureteral or kidney stones.  Kidneys ureters and bladder are normal.  Liver is normal no calcified gallstones or bile duct dilatation.  Normal pancreas.  Normal appendix.  No bowel obstruction.  No acute findings.  Again with no abdominal tenderness felt that other pathology such as ovarian pathology would be very unlikely.  Pregnancy  test negative.  CMP largely unremarkable with normal creatinine and liver function.  Urinalysis without evidence of UTI making pyelonephritis unlikely.  Blood cell count 3.9.  Hemoglobin 11.7.  On reevaluation patient was feeling improved.  Discussed with her that the exact etiology of her symptoms is unknown at this time.  Possible she could have potentially passed a kidney stone versus musculoskeletal etiology.  Discussed following up with her primary care provider.  She was given a prescription for Zofran as needed at home.  Advised of return precautions to the ER.  She voiced understanding was comfortable with this plan.  Discussed supportive care at home.  She was discharged home in stable condition.         At the conclusion of the encounter I discussed the results of all of the tests and the disposition. The questions were answered. The patient or family acknowledged understanding and was agreeable with the care plan.           Medical Decision Making    History:    Supplemental history from: Documented in chart, if applicable    External Record(s) reviewed: Documented in chart, if applicable.    Work Up:    Chart documentation includes differential considered and any EKGs or imaging independently interpreted by provider.    In additional to work up documented, I considered the following work up: Documented in chart, if applicable.    External consultation:    Discussion of management with another provider: Documented in chart, if applicable    Complicating factors:    Care impacted by chronic illness: Mental Health    Care affected by social determinants of health: N/A    Disposition considerations: Discharge. I prescribed additional prescription strength medication(s) as charted. See documentation for any additional details.      MEDICATIONS GIVEN IN THE EMERGENCY:  Medications   ketorolac (TORADOL) injection 15 mg (15 mg Intravenous $Given 5/25/23 8579)   ondansetron (ZOFRAN) injection 4 mg (4 mg  Intravenous $Given 5/25/23 1351)   0.9% sodium chloride BOLUS (0 mLs Intravenous Stopped 5/25/23 1541)   HYDROmorphone (PF) (DILAUDID) injection 0.5 mg (0.5 mg Intravenous $Given 5/25/23 1540)   ondansetron (ZOFRAN) injection 4 mg (4 mg Intravenous $Given 5/25/23 1541)       NEW PRESCRIPTIONS STARTED AT TODAY'S ER VISIT  Discharge Medication List as of 5/25/2023  4:12 PM      START taking these medications    Details   ondansetron (ZOFRAN ODT) 4 MG ODT tab Take 1 tablet (4 mg) by mouth every 8 hours as needed for nausea or vomiting, Disp-10 tablet, R-0, E-Prescribe             =================================================================    HPI    Patient information was obtained from: Patient and Nurse triage note    Use of : N/A     Viktoriya Lovelace is a 42 year old female with a pertinent history of kidney stones, and s/p tubal ligation who presents to this ED for evaluation of right lower back/flank pain.    Per nurse triage note, the patient had dull right flank pain that started last evening (5/24). After using the bathroom here in the ED today (5/25) her pain has now been constant.    The patient states that starting today (5/25) this morning her flank pain had worsened and is now more sharp/severe, and worsened with movement. She has not taken anything for her pain. No significant radiation into the abdomen.     She denied any nausea, vomiting, diarrhea, cough, chest pain, trouble breathing, numbness or tingling in legs, recent injuries, or chance of pregnancy. No saddle anesthesia. No loss of control of the bowel or bladder. Denies any dysuria, hematuria. Reports this feels like her prior kidney stone. No history of back injury or problems. Patient does have a history of s/p tubal ligation, and kidney stones. No IV drug use.    REVIEW OF SYSTEMS      Pertinent positives and negatives are documented in the HPI. All other systems reviewed and are negative.      PAST MEDICAL HISTORY:  No past  "medical history on file.    PAST SURGICAL HISTORY:  Past Surgical History:   Procedure Laterality Date     OTHER SURGICAL HISTORY      Cosmetic Ear Surgery     TUBAL LIGATION             CURRENT MEDICATIONS:    citalopram (CELEXA) 10 MG tablet  cyclobenzaprine (FLEXERIL) 10 MG tablet  cyclobenzaprine (FLEXERIL) 10 MG tablet  hydroxychloroquine (PLAQUENIL) 200 mg tablet  IBUPROFEN IB ORAL  naproxen (NAPROSYN) 500 MG tablet  rizatriptan (MAXALT) 10 MG tablet        ALLERGIES:  No Known Allergies    FAMILY HISTORY:  Family History   Problem Relation Age of Onset     Melanoma Mother         on face     Diabetes Type 2  Father      Coronary Artery Disease Father      Acute Myocardial Infarction Father        SOCIAL HISTORY:   Social History     Socioeconomic History     Marital status:    Tobacco Use     Smoking status: Former     Types: Cigarettes     Quit date: 2012     Years since quittin.4     Smokeless tobacco: Never   Substance and Sexual Activity     Alcohol use: No     Drug use: No       VITALS:  /69   Pulse 72   Temp 98.8  F (37.1  C) (Temporal)   Resp 18   Ht 1.753 m (5' 9\")   Wt 81.6 kg (180 lb)   LMP 2023   SpO2 100%   BMI 26.58 kg/m      PHYSICAL EXAM    Physical Exam  Constitutional: Well developed, Well nourished, NAD, GCS 15  HENT: Normocephalic, Atraumatic, Bilateral external ears normal, Oropharynx normal, mucous membranes moist, Nose normal. Neck- Normal range of motion, No tenderness, Supple, No stridor.   Eyes: PERRL, EOMI, Conjunctiva normal, No discharge.   Respiratory: Normal breath sounds, No respiratory distress, No wheezing or crackles, Speaks in full sentences easily.    Cardiovascular: Normal heart rate, Regular rhythm,No murmurs, No rubs, No gallops. 2+ radial pulses bilaterally  GI: Bowel sounds normal, Soft, No tenderness, No masses, No rebound or guarding. No CVA tenderness bilaterally  Musculoskeletal: 2+ DP pulses. No notable lower extremity edema. " Good range of motion in all major joints. No tenderness to palpation or major deformities noted. No tenderness of the CTLS spine.   Integument: Warm, Dry, No erythema, No rash. No petechiae.   Neurologic: Alert & oriented x 3, 5/5 strength in all 4 extremities bilaterally. Sensation intact to light touch in all 4 extremities and the face bilaterally. No focal deficits noted.    Psychiatric: Affect normal, Judgment normal, Mood normal. Cooperative.      LAB:  All pertinent labs reviewed and interpreted.  Results for orders placed or performed during the hospital encounter of 05/25/23   CT Abdomen Pelvis w/o Contrast    Impression    IMPRESSION:   1.  No acute findings in the abdomen and pelvis. No significant change.  2.  Kidneys, ureters and bladder are normal.  3.  Colonic diverticulosis with no diverticulitis.  4.  Small amount of free fluid in pelvis.     Comprehensive metabolic panel   Result Value Ref Range    Sodium 140 136 - 145 mmol/L    Potassium 4.0 3.4 - 5.3 mmol/L    Chloride 107 98 - 107 mmol/L    Carbon Dioxide (CO2) 22 22 - 29 mmol/L    Anion Gap 11 7 - 15 mmol/L    Urea Nitrogen 9.0 6.0 - 20.0 mg/dL    Creatinine 0.87 0.51 - 0.95 mg/dL    Calcium 9.0 8.6 - 10.0 mg/dL    Glucose 94 70 - 99 mg/dL    Alkaline Phosphatase 72 35 - 104 U/L    AST 17 10 - 35 U/L    ALT 11 10 - 35 U/L    Protein Total 7.1 6.4 - 8.3 g/dL    Albumin 4.4 3.5 - 5.2 g/dL    Bilirubin Total 0.4 <=1.2 mg/dL    GFR Estimate 85 >60 mL/min/1.73m2   UA with Microscopic reflex to Culture    Specimen: Urine, Midstream   Result Value Ref Range    Color Urine Colorless Colorless, Straw, Light Yellow, Yellow    Appearance Urine Clear Clear    Glucose Urine Negative Negative mg/dL    Bilirubin Urine Negative Negative    Ketones Urine Negative Negative mg/dL    Specific Gravity Urine 1.010 1.001 - 1.030    Blood Urine Negative Negative    pH Urine 6.0 5.0 - 7.0    Protein Albumin Urine Negative Negative mg/dL    Urobilinogen Urine <2.0 <2.0  mg/dL    Nitrite Urine Negative Negative    Leukocyte Esterase Urine Negative Negative    Bacteria Urine Few (A) None Seen /HPF    Mucus Urine Present (A) None Seen /LPF    RBC Urine 1 <=2 /HPF    WBC Urine 0 <=5 /HPF    Squamous Epithelials Urine <1 <=1 /HPF   hCG Quantitative Pregnancy (blood)   Result Value Ref Range    hCG Quantitative <1 <5 mIU/mL   CBC with platelets and differential   Result Value Ref Range    WBC Count 3.9 (L) 4.0 - 11.0 10e3/uL    RBC Count 4.45 3.80 - 5.20 10e6/uL    Hemoglobin 11.7 11.7 - 15.7 g/dL    Hematocrit 37.5 35.0 - 47.0 %    MCV 84 78 - 100 fL    MCH 26.3 (L) 26.5 - 33.0 pg    MCHC 31.2 (L) 31.5 - 36.5 g/dL    RDW 12.7 10.0 - 15.0 %    Platelet Count 253 150 - 450 10e3/uL    % Neutrophils 46 %    % Lymphocytes 44 %    % Monocytes 6 %    % Eosinophils 3 %    % Basophils 1 %    % Immature Granulocytes 0 %    NRBCs per 100 WBC 0 <1 /100    Absolute Neutrophils 1.8 1.6 - 8.3 10e3/uL    Absolute Lymphocytes 1.7 0.8 - 5.3 10e3/uL    Absolute Monocytes 0.2 0.0 - 1.3 10e3/uL    Absolute Eosinophils 0.1 0.0 - 0.7 10e3/uL    Absolute Basophils 0.0 0.0 - 0.2 10e3/uL    Absolute Immature Granulocytes 0.0 <=0.4 10e3/uL    Absolute NRBCs 0.0 10e3/uL       RADIOLOGY:  Reviewed all pertinent imaging. Please see official radiology report.  CT Abdomen Pelvis w/o Contrast   Final Result   IMPRESSION:    1.  No acute findings in the abdomen and pelvis. No significant change.   2.  Kidneys, ureters and bladder are normal.   3.  Colonic diverticulosis with no diverticulitis.   4.  Small amount of free fluid in pelvis.             [facility] Documentation:   CMS Diagnoses:               Vishnu DOMINGUEZ , am serving as a scribe to document services personally performed by Carol Brooks MD based on my observation and the provider's statements to me. I, Carol Brooks MD, attest that Vishnu Castle  is acting in a scribe capacity, has observed my performance of the services and has  documented them in accordance with my direction.    Carol Brooks MD  [facility] EMERGENCY DEPARTMENT  [facility] [facility] [address] [phone]     Carol Brooks MD  05/29/23 7647

## 2023-06-19 ENCOUNTER — OFFICE VISIT (OUTPATIENT)
Dept: FAMILY MEDICINE | Facility: CLINIC | Age: 43
End: 2023-06-19
Payer: OTHER GOVERNMENT

## 2023-06-19 VITALS
WEIGHT: 185.38 LBS | TEMPERATURE: 97.7 F | BODY MASS INDEX: 27.46 KG/M2 | HEART RATE: 79 BPM | HEIGHT: 69 IN | OXYGEN SATURATION: 97 % | SYSTOLIC BLOOD PRESSURE: 110 MMHG | DIASTOLIC BLOOD PRESSURE: 80 MMHG

## 2023-06-19 DIAGNOSIS — J34.1 MUCOUS RETENTION CYST OF MAXILLARY SINUS: ICD-10-CM

## 2023-06-19 DIAGNOSIS — Z00.00 HEALTHCARE MAINTENANCE: ICD-10-CM

## 2023-06-19 DIAGNOSIS — N94.6 DYSMENORRHEA: ICD-10-CM

## 2023-06-19 DIAGNOSIS — Z11.59 NEED FOR HEPATITIS C SCREENING TEST: ICD-10-CM

## 2023-06-19 DIAGNOSIS — Z00.00 ANNUAL PHYSICAL EXAM: ICD-10-CM

## 2023-06-19 DIAGNOSIS — Z87.442 HISTORY OF NEPHROLITHIASIS: ICD-10-CM

## 2023-06-19 DIAGNOSIS — F32.1 CURRENT MODERATE EPISODE OF MAJOR DEPRESSIVE DISORDER WITHOUT PRIOR EPISODE (H): Primary | ICD-10-CM

## 2023-06-19 DIAGNOSIS — G43.009 MIGRAINE WITHOUT AURA AND WITHOUT STATUS MIGRAINOSUS, NOT INTRACTABLE: ICD-10-CM

## 2023-06-19 PROBLEM — F17.201 NICOTINE DEPENDENCE IN REMISSION: Status: RESOLVED | Noted: 2023-02-04 | Resolved: 2023-06-19

## 2023-06-19 PROCEDURE — 99396 PREV VISIT EST AGE 40-64: CPT | Performed by: STUDENT IN AN ORGANIZED HEALTH CARE EDUCATION/TRAINING PROGRAM

## 2023-06-19 PROCEDURE — 86803 HEPATITIS C AB TEST: CPT | Performed by: STUDENT IN AN ORGANIZED HEALTH CARE EDUCATION/TRAINING PROGRAM

## 2023-06-19 PROCEDURE — 80061 LIPID PANEL: CPT | Performed by: STUDENT IN AN ORGANIZED HEALTH CARE EDUCATION/TRAINING PROGRAM

## 2023-06-19 PROCEDURE — 36415 COLL VENOUS BLD VENIPUNCTURE: CPT | Performed by: STUDENT IN AN ORGANIZED HEALTH CARE EDUCATION/TRAINING PROGRAM

## 2023-06-19 RX ORDER — ONDANSETRON 4 MG/1
TABLET, ORALLY DISINTEGRATING ORAL
COMMUNITY
Start: 2023-05-30 | End: 2023-09-28

## 2023-06-19 ASSESSMENT — ENCOUNTER SYMPTOMS
DIARRHEA: 0
DYSURIA: 0
HEMATOCHEZIA: 0
FEVER: 0
HEADACHES: 0
SORE THROAT: 0
JOINT SWELLING: 0
WEAKNESS: 0
CHILLS: 0
DIZZINESS: 0
NAUSEA: 1
HEARTBURN: 0
CONSTIPATION: 0
ARTHRALGIAS: 0
NERVOUS/ANXIOUS: 1
MYALGIAS: 0
SHORTNESS OF BREATH: 0
PALPITATIONS: 0
HEMATURIA: 0
FREQUENCY: 0
EYE PAIN: 0
ABDOMINAL PAIN: 0
PARESTHESIAS: 0
COUGH: 0

## 2023-06-19 ASSESSMENT — PAIN SCALES - GENERAL: PAINLEVEL: NO PAIN (0)

## 2023-06-19 ASSESSMENT — PATIENT HEALTH QUESTIONNAIRE - PHQ9
SUM OF ALL RESPONSES TO PHQ QUESTIONS 1-9: 6
10. IF YOU CHECKED OFF ANY PROBLEMS, HOW DIFFICULT HAVE THESE PROBLEMS MADE IT FOR YOU TO DO YOUR WORK, TAKE CARE OF THINGS AT HOME, OR GET ALONG WITH OTHER PEOPLE: SOMEWHAT DIFFICULT
SUM OF ALL RESPONSES TO PHQ QUESTIONS 1-9: 6
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
## 2023-06-20 LAB
CHOLEST SERPL-MCNC: 188 MG/DL
HCV AB SERPL QL IA: NONREACTIVE
HDLC SERPL-MCNC: 34 MG/DL
LDLC SERPL CALC-MCNC: 130 MG/DL
NONHDLC SERPL-MCNC: 154 MG/DL
TRIGL SERPL-MCNC: 122 MG/DL

## 2023-06-20 NOTE — RESULT ENCOUNTER NOTE
Dulce  Your results from your recent clinic visit show:  Your lipids look ok and I used these as well as other factors from your history to calculate your 10 year risk of having something like a heart attack (ASCVD risk) and it was low risk. Just continue to work on exercise and diet to maintain this low risk.    The 10-year ASCVD risk score (Neno MCGOWAN, et al., 2019) is: 1%    Values used to calculate the score:      Age: 42 years      Sex: Female      Is Non- : No      Diabetic: No      Tobacco smoker: No      Systolic Blood Pressure: 110 mmHg      Is BP treated: No      HDL Cholesterol: 34 mg/dL      Total Cholesterol: 188 mg/dL    Your Hep c screen is normal or negative    If you have more questions please call the clinic at [phone] or send me a [employer] message    Dr. Boom Waters

## 2023-06-27 ENCOUNTER — VIRTUAL VISIT (OUTPATIENT)
Dept: PSYCHOLOGY | Facility: CLINIC | Age: 43
End: 2023-06-27
Attending: STUDENT IN AN ORGANIZED HEALTH CARE EDUCATION/TRAINING PROGRAM
Payer: OTHER GOVERNMENT

## 2023-06-27 DIAGNOSIS — F32.1 CURRENT MODERATE EPISODE OF MAJOR DEPRESSIVE DISORDER WITHOUT PRIOR EPISODE (H): ICD-10-CM

## 2023-06-27 PROCEDURE — 90834 PSYTX W PT 45 MINUTES: CPT | Mod: VID | Performed by: SOCIAL WORKER

## 2023-06-27 ASSESSMENT — COLUMBIA-SUICIDE SEVERITY RATING SCALE - C-SSRS
TOTAL  NUMBER OF INTERRUPTED ATTEMPTS LIFETIME: NO
TOTAL  NUMBER OF ABORTED OR SELF INTERRUPTED ATTEMPTS LIFETIME: NO
1. IN THE PAST MONTH, HAVE YOU WISHED YOU WERE DEAD OR WISHED YOU COULD GO TO SLEEP AND NOT WAKE UP?: YES
REASONS FOR IDEATION PAST MONTH: COMPLETELY TO END OR STOP THE PAIN (YOU COULDN'T GO ON LIVING WITH THE PAIN OR HOW YOU WERE FEELING)
2. HAVE YOU ACTUALLY HAD ANY THOUGHTS OF KILLING YOURSELF?: YES
5. HAVE YOU STARTED TO WORK OUT OR WORKED OUT THE DETAILS OF HOW TO KILL YOURSELF? DO YOU INTEND TO CARRY OUT THIS PLAN?: YES
2. HAVE YOU ACTUALLY HAD ANY THOUGHTS OF KILLING YOURSELF?: YES
1. HAVE YOU WISHED YOU WERE DEAD OR WISHED YOU COULD GO TO SLEEP AND NOT WAKE UP?: YES
REASONS FOR IDEATION LIFETIME: COMPLETELY TO END OR STOP THE PAIN (YOU COULDN'T GO ON LIVING WITH THE PAIN OR HOW YOU WERE FEELING)
3. HAVE YOU BEEN THINKING ABOUT HOW YOU MIGHT KILL YOURSELF?: YES
ATTEMPT LIFETIME: NO
4. HAVE YOU HAD THESE THOUGHTS AND HAD SOME INTENTION OF ACTING ON THEM?: YES
6. HAVE YOU EVER DONE ANYTHING, STARTED TO DO ANYTHING, OR PREPARED TO DO ANYTHING TO END YOUR LIFE?: NO
5. HAVE YOU STARTED TO WORK OUT OR WORKED OUT THE DETAILS OF HOW TO KILL YOURSELF? DO YOU INTEND TO CARRY OUT THIS PLAN?: YES

## 2023-06-27 ASSESSMENT — ANXIETY QUESTIONNAIRES
GAD7 TOTAL SCORE: 11
1. FEELING NERVOUS, ANXIOUS, OR ON EDGE: MORE THAN HALF THE DAYS
4. TROUBLE RELAXING: MORE THAN HALF THE DAYS
6. BECOMING EASILY ANNOYED OR IRRITABLE: SEVERAL DAYS
8. IF YOU CHECKED OFF ANY PROBLEMS, HOW DIFFICULT HAVE THESE MADE IT FOR YOU TO DO YOUR WORK, TAKE CARE OF THINGS AT HOME, OR GET ALONG WITH OTHER PEOPLE?: VERY DIFFICULT
GAD7 TOTAL SCORE: 11
3. WORRYING TOO MUCH ABOUT DIFFERENT THINGS: MORE THAN HALF THE DAYS
GAD7 TOTAL SCORE: 11
IF YOU CHECKED OFF ANY PROBLEMS ON THIS QUESTIONNAIRE, HOW DIFFICULT HAVE THESE PROBLEMS MADE IT FOR YOU TO DO YOUR WORK, TAKE CARE OF THINGS AT HOME, OR GET ALONG WITH OTHER PEOPLE: VERY DIFFICULT
7. FEELING AFRAID AS IF SOMETHING AWFUL MIGHT HAPPEN: SEVERAL DAYS
2. NOT BEING ABLE TO STOP OR CONTROL WORRYING: MORE THAN HALF THE DAYS
7. FEELING AFRAID AS IF SOMETHING AWFUL MIGHT HAPPEN: SEVERAL DAYS
5. BEING SO RESTLESS THAT IT IS HARD TO SIT STILL: SEVERAL DAYS

## 2023-06-27 ASSESSMENT — PATIENT HEALTH QUESTIONNAIRE - PHQ9
SUM OF ALL RESPONSES TO PHQ QUESTIONS 1-9: 9
SUM OF ALL RESPONSES TO PHQ QUESTIONS 1-9: 9
10. IF YOU CHECKED OFF ANY PROBLEMS, HOW DIFFICULT HAVE THESE PROBLEMS MADE IT FOR YOU TO DO YOUR WORK, TAKE CARE OF THINGS AT HOME, OR GET ALONG WITH OTHER PEOPLE: VERY DIFFICULT

## 2023-06-27 NOTE — PROGRESS NOTES
"[facility]   Mental Health & Addiction Services     Progress Note - Initial Visit    Patient  Name:  Viktoriya Lovelace Date: 2023         Service Type: Individual     Visit Start Time: 10:37 am Visit End Time: 11:26 am     Visit #: 1    Attendees: Client attended alone    Service Modality:  Video Visit:      Provider verified identity through the following two step process.  Patient provided:  Patient photo, Patient  and Patient address    Telemedicine Visit: The patient's condition can be safely assessed and treated via synchronous audio and visual telemedicine encounter.      Reason for Telemedicine Visit: Patient has requested telehealth visit and Patient convenience (e.g. access to timely appointments / distance to available provider)    Originating Site (Patient Location): Patient's home    Distant Site (Provider Location): Provider Remote Setting- Home Office    Consent:  The patient/guardian has verbally consented to: the potential risks and benefits of telemedicine (video visit) versus in person care; bill my insurance or make self-payment for services provided; and responsibility for payment of non-covered services.     Patient would like the video invitation sent by:  My Chart    Mode of Communication:  Video Conference via [employer]    Distant Location (Provider):  Off-site    As the provider I attest to compliance with applicable laws and regulations related to telemedicine.       DATA:   Interactive Complexity: No   Crisis: No     Presenting Concerns/  Current Stressors:   Dr. Mg referred me after I cried through our entire session. \"My  and I are seperated. Five years ago his  mother  and then his father  also immediately after. He started drinking and then seeking support outside of  our marriage.\"She reported racing thoughts. She stopped taking Celexa on the , so on the  and   she was suicidal with taking herself off of these " medications.    The client also reports financial stressors.       ASSESSMENT:  Mental Status Assessment:  Appearance:   Appropriate   Eye Contact:   Fair -doing art work while talking  Psychomotor Behavior: Normal   Attitude:   Cooperative   Orientation:   All  Speech   Rate / Production: Normal/ Responsive   Volume:  Normal   Mood:    Sad   Affect:    Flat   Thought Content:  Clear   Thought Form:  Coherent  Logical   Insight:    Good       Safety Issues and Plan for Safety and Risk Management:     [place] Suicide Severity Rating Scale (Lifetime/Recent)      6/27/2023    11:14 AM   [place] Suicide Severity Rating (Lifetime/Recent)   1. Wish to be Dead (Lifetime) Y   1. Wish to be Dead (Past 1 Month) Y   2. Non-Specific Active Suicidal Thoughts (Lifetime) Y   2. Non-Specific Active Suicidal Thoughts (Past 1 Month) Y   Non-Specific Active Suicidal Thought Description (Past 1 Month) Thoughts of taking pills to overdose.   3. Active Suicidal Ideation with any Methods (Not Plan) Without Intent to Act (Lifetime) Y   3. Active Suicidal Ideation with any Methods (Not Plan) Without Intent to Act (Past 1 Month) Y   Active Suicidal Ideation with any Methods (Not Plan) Description (Past 1 Month) Thinking about overdosing with taking Celexa, but took off Celexa and now doing better.   4. Active Suicidal Ideation with Some Intent to Act, Without Specific Plan (Past 1 Month) Y   5. Active Suicidal Ideation with Specific Plan and Intent (Lifetime) Y   5. Active Suicidal Ideation with Specific Plan and Intent (Past 1 Month) Y   Most Severe Ideation Rating (Lifetime) 5   Frequency (Lifetime) 5   Frequency (Past 1 Month) 5   Duration (Lifetime) 5   Duration (Past 1 Month) 5   Controllability (Lifetime) 4   Controllability (Past 1 Month) 4   Deterrents (Lifetime) 1   Deterrents (Past 1 Month) 1   Reasons for Ideation (Lifetime) 5   Reasons for Ideation (Past 1 Month) 5   Actual Attempt (Lifetime) N   Has subject engaged in  non-suicidal self-injurious behavior? (Lifetime) N   Interrupted Attempts (Lifetime) N   Aborted or Self-Interrupted Attempt (Lifetime) N   Preparatory Acts or Behavior (Lifetime) N   Calculated C-SSRS Risk Score (Lifetime/Recent) High Risk     Patient denies current fears or concerns for personal safety.  Patient reports the following current or recent suicidal ideation or behaviors: Thoughts over taking pills to end her life after going off Celexa. .  Patient denies current or recent homicidal ideation or behaviors.  Patient denies current or recent self injurious behavior or ideation.  Patient denies other safety concerns.  Recommended that patient call 911 or go to the local ED should there be a change in any of these risk factors.  Patient reports there are no firearms in the house.     Diagnostic Criteria:  Major Depressive Disorder  CRITERIA (A-C) REPRESENT A MAJOR DEPRESSIVE EPISODE - SELECT THESE CRITERIA  A) Recurrent episode(s) - symptoms have been present during the same 2-week period and represent a change from previous functioning 5 or more symptoms (required for diagnosis)   - Depressed mood. Note: In children and adolescents, can be irritable mood.     - Diminished interest or pleasure in all, or almost all, activities.    - Decreased sleep.    - Feelings of worthlessness or inappropriate and excessive guilt.    - Diminished ability to think or concentrate, or indecisiveness.    - Recurrent thoughts of death (not just fear of dying), recurrent suicidal ideation without a specific plan, or a suicide attempt or a specific plan for committing suicide.   B) The symptoms cause clinically significant distress or impairment in social, occupational, or other important areas of functioning  C) The episode is not attributable to the physiological effects of a substance or to another medical condition  D) The occurence of major depressive episode is not better explained by other thought / psychotic  disorders  E) There has never been a manic episode or hypomanic episode    Rule out Generalized Anxiety Disorder     DSM5 Diagnoses: (Sustained by DSM5 Criteria Listed Above)  Diagnoses: 296.32 (F33.1) Major Depressive Disorder, Recurrent Episode, Moderate _  Psychosocial & Contextual Factors: In the process of , limited income, etc.   WHODAS 2.0 (12 item):        No data to display              Intervention:   Completed through review of safety issues and safety interventions  Collateral Reports Completed:  Not Applicable      PLAN: (Homework, other):  1. Provider will continue Diagnostic Assessment.  Patient was given the following to do until next session: Consider goals, self-care, etc.     2. Provider recommended the following referrals: Take medications, attend therapy for support.     3.  Suicide Risk and Safety Concerns were assessed for Viktoriya Lovelace.    Patient meets the following risk assessment and triage: [place] Suicide Severity Rating Scale not completed do the following reason : time constraints with limited time for this session. Will complete in future sessions.       Rach Quinn, [facility], [facility]  June 27, 2023        Answers for HPI/ROS submitted by the patient on 6/27/2023  If you checked off any problems, how difficult have these problems made it for you to do your work, take care of things at home, or get along with other people?: Very difficult  PHQ9 TOTAL SCORE: 9  JM 7 TOTAL SCORE: 11

## 2023-07-03 ENCOUNTER — HOSPITAL ENCOUNTER (OUTPATIENT)
Dept: MAMMOGRAPHY | Facility: CLINIC | Age: 43
Discharge: HOME | End: 2023-07-03
Attending: STUDENT IN AN ORGANIZED HEALTH CARE EDUCATION/TRAINING PROGRAM
Payer: OTHER GOVERNMENT

## 2023-07-03 DIAGNOSIS — R92.8 ABNORMAL MAMMOGRAM: ICD-10-CM

## 2023-07-03 DIAGNOSIS — N63.20 LEFT BREAST LUMP: ICD-10-CM

## 2023-07-03 DIAGNOSIS — Z12.31 ENCOUNTER FOR SCREENING MAMMOGRAM FOR BREAST CANCER: ICD-10-CM

## 2023-07-03 PROCEDURE — 77066 DX MAMMO INCL CAD BI: CPT

## 2023-07-03 PROCEDURE — 76642 ULTRASOUND BREAST LIMITED: CPT | Mod: LT

## 2023-07-07 ENCOUNTER — FCC EXTENDED DOCUMENTATION (OUTPATIENT)
Dept: PSYCHOLOGY | Facility: CLINIC | Age: 43
End: 2023-07-07
Payer: OTHER GOVERNMENT

## 2023-07-07 ENCOUNTER — VIRTUAL VISIT (OUTPATIENT)
Dept: PSYCHOLOGY | Facility: CLINIC | Age: 43
End: 2023-07-07
Payer: OTHER GOVERNMENT

## 2023-07-07 DIAGNOSIS — F33.1 MAJOR DEPRESSIVE DISORDER, RECURRENT EPISODE, MODERATE (H): Primary | ICD-10-CM

## 2023-07-07 DIAGNOSIS — F41.1 GENERALIZED ANXIETY DISORDER: ICD-10-CM

## 2023-07-07 PROCEDURE — 90834 PSYTX W PT 45 MINUTES: CPT | Mod: VID

## 2023-07-07 ASSESSMENT — COLUMBIA-SUICIDE SEVERITY RATING SCALE - C-SSRS
1. HAVE YOU WISHED YOU WERE DEAD OR WISHED YOU COULD GO TO SLEEP AND NOT WAKE UP?: YES
ATTEMPT LIFETIME: NO
TOTAL  NUMBER OF INTERRUPTED ATTEMPTS LIFETIME: NO
1. IN THE PAST MONTH, HAVE YOU WISHED YOU WERE DEAD OR WISHED YOU COULD GO TO SLEEP AND NOT WAKE UP?: YES
4. HAVE YOU HAD THESE THOUGHTS AND HAD SOME INTENTION OF ACTING ON THEM?: YES
2. HAVE YOU ACTUALLY HAD ANY THOUGHTS OF KILLING YOURSELF?: NO
TOTAL  NUMBER OF ABORTED OR SELF INTERRUPTED ATTEMPTS LIFETIME: NO
2. HAVE YOU ACTUALLY HAD ANY THOUGHTS OF KILLING YOURSELF?: YES
3. HAVE YOU BEEN THINKING ABOUT HOW YOU MIGHT KILL YOURSELF?: NO
2. HAVE YOU ACTUALLY HAD ANY THOUGHTS OF KILLING YOURSELF?: I DON'T WANT TO BE ALIVE
REASONS FOR IDEATION PAST MONTH: COMPLETELY TO END OR STOP THE PAIN (YOU COULDN'T GO ON LIVING WITH THE PAIN OR HOW YOU WERE FEELING)
5. HAVE YOU STARTED TO WORK OUT OR WORKED OUT THE DETAILS OF HOW TO KILL YOURSELF? DO YOU INTEND TO CARRY OUT THIS PLAN?: YES
6. HAVE YOU EVER DONE ANYTHING, STARTED TO DO ANYTHING, OR PREPARED TO DO ANYTHING TO END YOUR LIFE?: NO
REASONS FOR IDEATION LIFETIME: COMPLETELY TO END OR STOP THE PAIN (YOU COULDN'T GO ON LIVING WITH THE PAIN OR HOW YOU WERE FEELING)
5. HAVE YOU STARTED TO WORK OUT OR WORKED OUT THE DETAILS OF HOW TO KILL YOURSELF? DO YOU INTEND TO CARRY OUT THIS PLAN?: YES

## 2023-07-07 ASSESSMENT — ANXIETY QUESTIONNAIRES
1. FEELING NERVOUS, ANXIOUS, OR ON EDGE: NEARLY EVERY DAY
GAD7 TOTAL SCORE: 13
6. BECOMING EASILY ANNOYED OR IRRITABLE: MORE THAN HALF THE DAYS
7. FEELING AFRAID AS IF SOMETHING AWFUL MIGHT HAPPEN: SEVERAL DAYS
4. TROUBLE RELAXING: MORE THAN HALF THE DAYS
IF YOU CHECKED OFF ANY PROBLEMS ON THIS QUESTIONNAIRE, HOW DIFFICULT HAVE THESE PROBLEMS MADE IT FOR YOU TO DO YOUR WORK, TAKE CARE OF THINGS AT HOME, OR GET ALONG WITH OTHER PEOPLE: VERY DIFFICULT
3. WORRYING TOO MUCH ABOUT DIFFERENT THINGS: MORE THAN HALF THE DAYS
5. BEING SO RESTLESS THAT IT IS HARD TO SIT STILL: SEVERAL DAYS
2. NOT BEING ABLE TO STOP OR CONTROL WORRYING: MORE THAN HALF THE DAYS

## 2023-07-07 ASSESSMENT — PATIENT HEALTH QUESTIONNAIRE - PHQ9
SUM OF ALL RESPONSES TO PHQ QUESTIONS 1-9: 10
10. IF YOU CHECKED OFF ANY PROBLEMS, HOW DIFFICULT HAVE THESE PROBLEMS MADE IT FOR YOU TO DO YOUR WORK, TAKE CARE OF THINGS AT HOME, OR GET ALONG WITH OTHER PEOPLE: VERY DIFFICULT
SUM OF ALL RESPONSES TO PHQ QUESTIONS 1-9: 10

## 2023-07-07 NOTE — PROGRESS NOTES
Please reference the patients chart to view the completed diagnostic assessment. -STEPHAN Lebron THOMAS. July 18th, 2023.

## 2023-07-07 NOTE — PROGRESS NOTES
"Provider Attestation:   I have reviewed and discussed this client with López Ernst, MSW Intern, and agree with the findings in this note. I did not participate in a shared visit by interviewing or treating the patient.     Rach Quinn [facility], [facility]  Date of Service (when I saw the patient): I did not personally see this patient today.        [facility]   Mental Health & Addiction Services     Progress Note - Initial Visit    Patient  Name:  Viktoriya Lovelace Date: 2023         Service Type: Individual     Visit Start Time: 2:30 PM  Visit End Time: 3:22 PM     Visit #: 1    Attendees: Client attended alone    Service Modality:  Video Visit:      Provider verified identity through the following two step process.  Patient provided:  Patient  and Patient address    Telemedicine Visit: The patient's condition can be safely assessed and treated via synchronous audio and visual telemedicine encounter.      Reason for Telemedicine Visit: Patient has requested telehealth visit    Originating Site (Patient Location): Patient's home    Distant Site (Provider Location): Provider Remote Setting- Home Office    Consent:  The patient/guardian has verbally consented to: the potential risks and benefits of telemedicine (video visit) versus in person care; bill my insurance or make self-payment for services provided; and responsibility for payment of non-covered services.     Patient would like the video invitation sent by:  Text to cell phone: [phone]     Mode of Communication:  Video Conference via [employer]    Distant Location (Provider):  Off-site    As the provider I attest to compliance with applicable laws and regulations related to telemedicine.       DATA:   Interactive Complexity: No   Crisis: No     Presenting Concerns/  Current Stressors:   The reason for seeking services at this time is: \"Situational Depression and anxiety\".  The problem(s) began 23. The patient reported that she " "has had numerous internal family stressors starting in 2018. She is currently taking Zoloft to manage symptoms, which she has reported difficulty with. The medication is currently increasing the patients thoughts of SI. The patient also reported the medication to \"make me feel off, like I'm not myself\". The patient is looking to come to therapy to process life with someone and being able to process stressors. Overall, the patient reports feeling overwhelmed with feelings of sadness and anxiety, difficulty sleeping and excessive sleep, frequent crying, avoiding social situations, and isolating. The patient also reported having little interest or pleasure in doing things, feeling down, feeling tired, a low sense of self-esteem, and difficulty concentrating. In terms of anxious symptoms, the patient reported feelings of being nervious, difficulty controlling worry, worrying too much about different things, trouble relaxing, and increased irritability. The patients scores of 13 on the JM-7 and 10 on the PHQ-9 affirms the patients reported mental health symptoms.     Patient has attempted to resolve these concerns in the past through  one source. One time was marriage counseling with her  in 2013 and then individual counseling when the patient moved to [place] in 2017/2018. The patient spoke with another therapist in 2021 and then marriage therapy again of 2022.       ASSESSMENT:  Mental Status Assessment:  Appearance:   Appropriate   Eye Contact:   Good   Psychomotor Behavior: Normal   Attitude:   Cooperative  Interested Friendly  Orientation:   All  Speech   Rate / Production: Normal/ Responsive   Volume:  Normal   Mood:    Anxious  Depressed   Affect:    Tearful  Thought Content:  Clear   Thought Form:  Coherent   Insight:    Good       Safety Issues and Plan for Safety and Risk Management:   [place] Suicide Severity Rating Scale (Lifetime/Recent)      6/27/2023    11:14 AM 7/7/2023     2:00 PM " "  [place] Suicide Severity Rating (Lifetime/Recent)   1. Wish to be Dead (Lifetime) Y Y   Wish to be Dead Description (Lifetime)  \"I don't wan't to be alive anymore\".   1. Wish to be Dead (Past 1 Month) Y Y   Wish to be Dead Description (Past 1 Month)  \"I don't wan't to be alive anymore\".   2. Non-Specific Active Suicidal Thoughts (Lifetime) Y N   2. Non-Specific Active Suicidal Thoughts (Past 1 Month) Y Y   Non-Specific Active Suicidal Thought Description (Past 1 Month) Thoughts of taking pills to overdose. \"I don't want to be alive\"   3. Active Suicidal Ideation with any Methods (Not Plan) Without Intent to Act (Lifetime) Y N   3. Active Suicidal Ideation with any Methods (Not Plan) Without Intent to Act (Past 1 Month) Y Y   Active Suicidal Ideation with any Methods (Not Plan) Description (Past 1 Month) Thinking about overdosing with taking Celexa, but took off Celexa and now doing better. \"I don't wan't to be alive anymore\".   4. Active Suicidal Ideation with Some Intent to Act, Without Specific Plan (Past 1 Month) Y Y   Active Suicidal Ideation with Some Intent to Act, Without Specific Plan Description (Past 1 Month)  \"I had thoughts about taking my celexa meds and not having to deal with it anymore\".   5. Active Suicidal Ideation with Specific Plan and Intent (Lifetime) Y Y   Active Suicidal Ideation with Specific Plan and Intent Description (Lifetime)  \"I had thoughts about taking my celexa meds and not having to deal with it anymore\".   5. Active Suicidal Ideation with Specific Plan and Intent (Past 1 Month) Y Y   Active Suicidal Ideation with Specific Plan and Intent Description (Past 1 Month)  \"I had thoughts about taking my celexa meds and not having to deal with it anymore\".   Most Severe Ideation Rating (Lifetime) 5 2   Description of Most Severe Ideation (Lifetime)  \"I had thoughts about taking my celexa meds and not having to deal with it anymore\".   Most Severe Ideation Rating (Past 1 Month)  2 " "  Description of Most Severe Ideation (Past 1 Month)  \"I had thoughts about taking my celexa meds and not having to deal with it anymore\".   Frequency (Lifetime) 5 1   Frequency (Past 1 Month) 5 1   Duration (Lifetime) 5 1   Duration (Past 1 Month) 5 1   Controllability (Lifetime) 4 2   Controllability (Past 1 Month) 4 2   Deterrents (Lifetime) 1 1   Deterrents (Past 1 Month) 1 1   Reasons for Ideation (Lifetime) 5 5   Reasons for Ideation (Past 1 Month) 5 5   Actual Attempt (Lifetime) N N   Has subject engaged in non-suicidal self-injurious behavior? (Lifetime) N N   Interrupted Attempts (Lifetime) N N   Aborted or Self-Interrupted Attempt (Lifetime) N N   Preparatory Acts or Behavior (Lifetime) N N   Calculated C-SSRS Risk Score (Lifetime/Recent) High Risk High Risk     Patient denies current fears or concerns for personal safety.  Patient reports the following current or recent suicidal ideation or behaviors: Suicidal ideation. .  Patient denies current or recent homicidal ideation or behaviors.  Patient denies current or recent self injurious behavior or ideation.  Patient denies other safety concerns.          [facility]                                       Viktoriya Lovelace     SAFETY PLAN:  Step 1: Warning signs / cues (Thoughts, images, mood, situation, behavior) that a crisis may be developing:  Thoughts: \"I am just so overwhelmed\".   Images: None at this time.   Thinking Processes: ruminations (can't stop thinking about my problems): feeling stuck on each emotion and racing thoughts  Mood: intense worry  Behaviors: isolating/withdrawing  and crying   Situations: relationship problems   Step 2: Coping strategies - Things I can do to take my mind off of my problems without contacting another person (relaxation technique, physical activity):  Distress Tolerance Strategies:  Utilizing grounding exercises and sensory experiences  Physical Activities: Coloring   Focus on helpful thoughts:  " think about happy memories: the kids and pets   Step 3: People and social settings that provide distraction:   Name: Work  Phone: N/A    Name: Talha Phone: The patient has Talha's phone number      Step 4: Remind myself of people and things that are important to me and worth living for:  The patients kids and pets.       Step 5: When I am in crisis, I can ask these people to help me use my safety plan:   Name: Talha Phone: The patient has Talha's phone number   Name: Amy Phone: The patient has Amy's phone number      Step 6: Making the environment safe:   remove drugs  Step 7: Professionals or agencies I can contact during a crisis:  Suicide Prevention Lifeline: Call or Text 988   Local Crisis Services: 911    Call 911 or go to my nearest emergency department.   I helped develop this safety plan and agree to use it when needed.  I have been given a copy of this plan.      Client signature _________________________________________________________________  Today s date:  7/7/2023  Completed by Provider Name/ Credentials:  STEPHAN Lebron LGSW  July 7, 2023  Adapted from Safety Plan Template 2008 Kitty Kaye and Ubaldo Pineda is reprinted with the express permission of the authors.  No portion of the Safety Plan Template may be reproduced without the express, written permission.  You can contact the authors at bhs@[email or web address] or [email or web address]@[email or web address].        Patient reports there are no firearms in the house.     Diagnostic Criteria:  Generalized Anxiety Disorder  A. Excessive anxiety and worry about a number of events or activities (such as work or school performance).   B. The person finds it difficult to control the worry.  C. Select 3 or more symptoms (required for diagnosis). Only one item is required in children.   - Restlessness or feeling keyed up or on edge.    - Being easily fatigued.    - Difficulty concentrating or mind going blank.    - Irritability.    - Sleep disturbance  (difficulty falling or staying asleep, or restless unsatisfying sleep).   D. The focus of the anxiety and worry is not confined to features of an Axis I disorder.  E. The anxiety, worry, or physical symptoms cause clinically significant distress or impairment in social, occupational, or other important areas of functioning.   F. The disturbance is not due to the direct physiological effects of a substance (e.g., a drug of abuse, a medication) or a general medical condition (e.g., hyperthyroidism) and does not occur exclusively during a Mood Disorder, a Psychotic Disorder, or a Pervasive Developmental Disorder.  Major Depressive Disorder  CRITERIA (A-C) REPRESENT A MAJOR DEPRESSIVE EPISODE - SELECT THESE CRITERIA  A) Recurrent episode(s) - symptoms have been present during the same 2-week period and represent a change from previous functioning 5 or more symptoms (required for diagnosis)   - Depressed mood. Note: In children and adolescents, can be irritable mood.     - Diminished interest or pleasure in all, or almost all, activities.    - Decreased sleep.    - Fatigue or loss of energy.    - Feelings of worthlessness or inappropriate and excessive guilt.    - Diminished ability to think or concentrate, or indecisiveness.    - Recurrent thoughts of death (not just fear of dying), recurrent suicidal ideation without a specific plan, or a suicide attempt or a specific plan for committing suicide.   B) The symptoms cause clinically significant distress or impairment in social, occupational, or other important areas of functioning  C) The episode is not attributable to the physiological effects of a substance or to another medical condition  D) The occurence of major depressive episode is not better explained by other thought / psychotic disorders  E) There has never been a manic episode or hypomanic episode      DSM5 Diagnoses: (Sustained by DSM5 Criteria Listed Above)  Diagnoses: 296.32 (F33.1) Major Depressive  Disorder, Recurrent Episode, Moderate _  300.02 (F41.1) Generalized Anxiety Disorder  Psychosocial & Contextual Factors: The patient reported concurrent ongoing family stressors that have significantly impacted the patients way of being. The patient is also in need of new housing in the next six months due to similar internal family stressors. The patient is experiencing maladaptive symptoms due to mental health medications and is consulting her primary care provider to address her concerns. Overall, the patient is experiencing significant symptoms of anxiety and depression including,  feeling overwhelmed with feelings of sadness and anxiety, difficulty sleeping and excessive sleep, frequent crying, avoiding social situations, and isolating. The patient also reported having little interest or pleasure in doing things, feeling down, feeling tired, a low sense of self-esteem, and difficulty concentrating. In terms of anxious symptoms, the patient reported feelings of being nervious, difficulty controlling worry, worrying too much about different things, trouble relaxing, and increased irritability.    Intervention:   Completed Safety plan, Educated on treatment planning and started identifying goals and interventions for treatment plan and the patient and therapist began completing the diagnostic assessment.   Collateral Reports Completed:  Routed note to PCP The patient would like to follow up with her PCP regarding concerns she has with her current medications. The therapist routed the note to the PCP to fill the care team in on the patients concerns.       PLAN: (Homework, other):  1. Provider will continue Diagnostic Assessment.  Patient was given the following to do until next session:  The patient is to begin contemplating goals and objectives she would like to complete during therapy. The writer will continue to work with the patient to complete the diagnostic assessment.     2. Provider recommended the  following referrals: N/A.      3.  Suicide Risk and Safety Concerns were assessed for Viktoriya Lovelace.        STEPHAN Alba [facility] July 7, 2023

## 2023-07-11 ENCOUNTER — E-VISIT (OUTPATIENT)
Dept: FAMILY MEDICINE | Facility: CLINIC | Age: 43
End: 2023-07-11
Payer: OTHER GOVERNMENT

## 2023-07-11 DIAGNOSIS — F32.1 CURRENT MODERATE EPISODE OF MAJOR DEPRESSIVE DISORDER WITHOUT PRIOR EPISODE (H): Primary | ICD-10-CM

## 2023-07-11 PROCEDURE — 99421 OL DIG E/M SVC 5-10 MIN: CPT | Performed by: STUDENT IN AN ORGANIZED HEALTH CARE EDUCATION/TRAINING PROGRAM

## 2023-07-11 RX ORDER — ESCITALOPRAM OXALATE 10 MG/1
10 TABLET ORAL DAILY
Qty: 30 TABLET | Refills: 1 | Status: SHIPPED | OUTPATIENT
Start: 2023-07-11 | End: 2023-07-12

## 2023-07-11 ASSESSMENT — PATIENT HEALTH QUESTIONNAIRE - PHQ9
SUM OF ALL RESPONSES TO PHQ QUESTIONS 1-9: 11
SUM OF ALL RESPONSES TO PHQ QUESTIONS 1-9: 11
10. IF YOU CHECKED OFF ANY PROBLEMS, HOW DIFFICULT HAVE THESE PROBLEMS MADE IT FOR YOU TO DO YOUR WORK, TAKE CARE OF THINGS AT HOME, OR GET ALONG WITH OTHER PEOPLE: VERY DIFFICULT

## 2023-07-11 ASSESSMENT — ANXIETY QUESTIONNAIRES
GAD7 TOTAL SCORE: 12
7. FEELING AFRAID AS IF SOMETHING AWFUL MIGHT HAPPEN: SEVERAL DAYS
1. FEELING NERVOUS, ANXIOUS, OR ON EDGE: NEARLY EVERY DAY
6. BECOMING EASILY ANNOYED OR IRRITABLE: SEVERAL DAYS
5. BEING SO RESTLESS THAT IT IS HARD TO SIT STILL: SEVERAL DAYS
4. TROUBLE RELAXING: MORE THAN HALF THE DAYS
3. WORRYING TOO MUCH ABOUT DIFFERENT THINGS: MORE THAN HALF THE DAYS
2. NOT BEING ABLE TO STOP OR CONTROL WORRYING: MORE THAN HALF THE DAYS
GAD7 TOTAL SCORE: 12

## 2023-07-12 DIAGNOSIS — F32.1 CURRENT MODERATE EPISODE OF MAJOR DEPRESSIVE DISORDER WITHOUT PRIOR EPISODE (H): ICD-10-CM

## 2023-07-12 RX ORDER — ESCITALOPRAM OXALATE 10 MG/1
10 TABLET ORAL DAILY
Qty: 30 TABLET | Refills: 1 | Status: SHIPPED | OUTPATIENT
Start: 2023-07-12 | End: 2023-09-28

## 2023-07-12 ASSESSMENT — ANXIETY QUESTIONNAIRES: GAD7 TOTAL SCORE: 12

## 2023-07-12 ASSESSMENT — PATIENT HEALTH QUESTIONNAIRE - PHQ9: SUM OF ALL RESPONSES TO PHQ QUESTIONS 1-9: 11

## 2023-07-18 ENCOUNTER — VIRTUAL VISIT (OUTPATIENT)
Dept: PSYCHOLOGY | Facility: CLINIC | Age: 43
End: 2023-07-18
Payer: OTHER GOVERNMENT

## 2023-07-18 DIAGNOSIS — F33.1 MAJOR DEPRESSIVE DISORDER, RECURRENT EPISODE, MODERATE (H): Primary | ICD-10-CM

## 2023-07-18 DIAGNOSIS — F41.1 GENERALIZED ANXIETY DISORDER: ICD-10-CM

## 2023-07-18 PROCEDURE — 90791 PSYCH DIAGNOSTIC EVALUATION: CPT | Mod: 95

## 2023-07-18 ASSESSMENT — ANXIETY QUESTIONNAIRES
6. BECOMING EASILY ANNOYED OR IRRITABLE: MORE THAN HALF THE DAYS
7. FEELING AFRAID AS IF SOMETHING AWFUL MIGHT HAPPEN: MORE THAN HALF THE DAYS
GAD7 TOTAL SCORE: 15
IF YOU CHECKED OFF ANY PROBLEMS ON THIS QUESTIONNAIRE, HOW DIFFICULT HAVE THESE PROBLEMS MADE IT FOR YOU TO DO YOUR WORK, TAKE CARE OF THINGS AT HOME, OR GET ALONG WITH OTHER PEOPLE: VERY DIFFICULT
3. WORRYING TOO MUCH ABOUT DIFFERENT THINGS: MORE THAN HALF THE DAYS
1. FEELING NERVOUS, ANXIOUS, OR ON EDGE: NEARLY EVERY DAY
2. NOT BEING ABLE TO STOP OR CONTROL WORRYING: NEARLY EVERY DAY
4. TROUBLE RELAXING: MORE THAN HALF THE DAYS
GAD7 TOTAL SCORE: 15
5. BEING SO RESTLESS THAT IT IS HARD TO SIT STILL: SEVERAL DAYS

## 2023-07-18 ASSESSMENT — PATIENT HEALTH QUESTIONNAIRE - PHQ9
SUM OF ALL RESPONSES TO PHQ QUESTIONS 1-9: 12
10. IF YOU CHECKED OFF ANY PROBLEMS, HOW DIFFICULT HAVE THESE PROBLEMS MADE IT FOR YOU TO DO YOUR WORK, TAKE CARE OF THINGS AT HOME, OR GET ALONG WITH OTHER PEOPLE: VERY DIFFICULT
SUM OF ALL RESPONSES TO PHQ QUESTIONS 1-9: 12

## 2023-07-18 NOTE — PROGRESS NOTES
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 [employer]. The patient is experiencing intense feelings of anxiety and depression, alongside passive SI.   3. Principal DSM5 Diagnoses  (Sustained by DSM5 Criteria Listed Above):   296.32 (F33.1) Major Depressive Disorder, Recurrent Episode, Moderate _  300.02 (F41.1) Generalized Anxiety Disorder.  6. Prognosis: Expect Improvement and Relieve Acute Symptoms.  7. Likely consequences of symptoms if not treated: The patient could experience worsening mental health symptoms and a decreased ability to provide for herself and her family.  8. Client strengths include:  caring, creative, educated, empathetic, employed, goal-focused, has a previous history of therapy, insightful, intelligent, motivated, open to learning, open to suggestions / feedback, responsible parent, support of family, friends and providers, wants to learn, willing to ask questions, willing to relate to others and work history .     Recommendations:     1. Plan for Safety and Risk Management:   Safety and Risk: A safety and risk management plan has been developed including:   Moderate Risk is identified when the patient has one of the following:  Suicidal Ideation with method (The How) Without plan, intent, or behavior in the past  "month (Yes to C-SSRS Ideation #3)        [facility]                                       Viktoriya Lovelace     SAFETY PLAN:  Step 1: Warning signs / cues (Thoughts, images, mood, situation, behavior) that a crisis may be developing:  Thoughts: \"I am just so overwhelmed\".   Images: None at this time.   Thinking Processes: ruminations (can't stop thinking about my problems): feeling stuck on each emotion and racing thoughts  Mood: intense worry  Behaviors: isolating/withdrawing  and crying   Situations: relationship problems   Step 2: Coping strategies - Things I can do to take my mind off of my problems without contacting another person (relaxation technique, physical activity):  Distress Tolerance Strategies:  Utilizing grounding exercises and sensory experiences  Physical Activities: Coloring   Focus on helpful thoughts:  think about happy memories: the kids and pets   Step 3: People and social settings that provide distraction:   Name: Work  Phone: N/A    Name: Talha Phone: The patient has [employer]'s phone number      Step 4: Remind myself of people and things that are important to me and worth living for:  The patients kids and pets.       Step 5: When I am in crisis, I can ask these people to help me use my safety plan:   Name: Talha Phone: The patient has [employer]'s phone number   Name: [employer] Phone: The patient has [employer] phone number      Step 6: Making the environment safe:   remove drugs  Step 7: Professionals or agencies I can contact during a crisis:  Suicide Prevention Lifeline: Call or Text 988   Local Crisis Services: 911    Call 911 or go to my nearest emergency department.   I helped develop this safety plan and agree to use it when needed.  I have been given a copy of this plan.      Client signature _________________________________________________________________  Today s date:  7/7/2023  Completed by Provider Name/ Credentials:  STEPHAN Lebron  July 7, 2023  Adapted from " Safety Plan Template 2008 Kitty Kaye and Ubaldo Pineda is reprinted with the express permission of the authors.  No portion of the Safety Plan Template may be reproduced without the express, written permission.  You can contact the authors at [email or web address] or [email or web address].[facility].        The following has been recommended:  Complete/Review/Update Safety Plan   The patient and provider reviewed the patients access to her safety plan and emergency resources. The writer also established future oriented thinking with the patient.     Safety Plan:  Patient consented to co-developed safety plan.  Safety and risk management plan was completed.  Patient agreed to use safety plan should any safety concerns arise.  A copy was given to the patient..          Report to child / adult protection services was NA.     2. Patient's identified  These pieces will be incorporated into the patient's treamtent plan at the patient's request.  .     3. Initial Treatment will focus on:    Depressed Mood - The writer and patient will explore current coping skills the patient is utilizing and then begin establishing new coping skills for the patient to utilize. The writer will also envoke processing of the patients cognitions to assess cognitive disortions and reframe the patients thoughts for her preferred way of being.  .     4. Resources/Service Plan:    services are not indicated.   Modifications to assist communication are not indicated.   Additional disability accommodations are not indicated.      5. Collaboration:   Collaboration / coordination of treatment will be initiated with the following  support professionals:  N/A.  .      6.  Referrals:   The following referral(s) will be initiated:  N/A.  . Next Scheduled Appointment: 08/01/2023.      A Release of Information has been obtained for the following:  N/A.  .     Emergency Contact  was obtained. Please reference the patients chart to view her  emergency contact.      Clinical Substantiation/medical necessity for the above recommendations:  N/A.    7. SERENITY:    SERENITY:  Discussed the general effects of drugs and alcohol on health and well-being.  Recommendations:  No concerns presented at this time.     8. Records:   These were reviewed at time of assessment.   Information in this assessment was obtained from the medical record and  provided by patient who is a good historian.    Patient will have open access to their mental health medical record.    9.   Interactive Complexity: No      Provider Name/ Credentials:  STEPHAN Lebron [facility]  July 18, 2023

## 2023-08-09 ENCOUNTER — VIRTUAL VISIT (OUTPATIENT)
Dept: PSYCHOLOGY | Facility: CLINIC | Age: 43
End: 2023-08-09
Payer: OTHER GOVERNMENT

## 2023-08-09 DIAGNOSIS — F41.1 GENERALIZED ANXIETY DISORDER: ICD-10-CM

## 2023-08-09 DIAGNOSIS — F33.1 MAJOR DEPRESSIVE DISORDER, RECURRENT EPISODE, MODERATE (H): Primary | ICD-10-CM

## 2023-08-09 PROCEDURE — 90834 PSYTX W PT 45 MINUTES: CPT | Mod: VID

## 2023-08-09 ASSESSMENT — ANXIETY QUESTIONNAIRES
1. FEELING NERVOUS, ANXIOUS, OR ON EDGE: NEARLY EVERY DAY
6. BECOMING EASILY ANNOYED OR IRRITABLE: MORE THAN HALF THE DAYS
GAD7 TOTAL SCORE: 15
4. TROUBLE RELAXING: NEARLY EVERY DAY
5. BEING SO RESTLESS THAT IT IS HARD TO SIT STILL: SEVERAL DAYS
2. NOT BEING ABLE TO STOP OR CONTROL WORRYING: NEARLY EVERY DAY
7. FEELING AFRAID AS IF SOMETHING AWFUL MIGHT HAPPEN: SEVERAL DAYS
3. WORRYING TOO MUCH ABOUT DIFFERENT THINGS: MORE THAN HALF THE DAYS
GAD7 TOTAL SCORE: 15
IF YOU CHECKED OFF ANY PROBLEMS ON THIS QUESTIONNAIRE, HOW DIFFICULT HAVE THESE PROBLEMS MADE IT FOR YOU TO DO YOUR WORK, TAKE CARE OF THINGS AT HOME, OR GET ALONG WITH OTHER PEOPLE: VERY DIFFICULT

## 2023-08-09 ASSESSMENT — PATIENT HEALTH QUESTIONNAIRE - PHQ9
SUM OF ALL RESPONSES TO PHQ QUESTIONS 1-9: 14
SUM OF ALL RESPONSES TO PHQ QUESTIONS 1-9: 14
10. IF YOU CHECKED OFF ANY PROBLEMS, HOW DIFFICULT HAVE THESE PROBLEMS MADE IT FOR YOU TO DO YOUR WORK, TAKE CARE OF THINGS AT HOME, OR GET ALONG WITH OTHER PEOPLE: VERY DIFFICULT

## 2023-08-09 NOTE — PROGRESS NOTES
[facility]                                     Progress Note    Patient Name: Viktoriya Lovelace  Date: 08/09/2023         Service Type: Individual      Session Start Time: 10:00 AM  Session End Time: 10:51 AM     Session Length: 51 Minutes     Session #: 3    Attendees: Client attended alone    Service Modality:  Video Visit:      Provider verified identity through the following two step process.  Patient provided:  Patient is known previously to provider    Telemedicine Visit: The patient's condition can be safely assessed and treated via synchronous audio and visual telemedicine encounter.      Reason for Telemedicine Visit: Patient has requested telehealth visit    Originating Site (Patient Location): Patient's home    Distant Site (Provider Location): [facility]    Consent:  The patient/guardian has verbally consented to: the potential risks and benefits of telemedicine (video visit) versus in person care; bill my insurance or make self-payment for services provided; and responsibility for payment of non-covered services.     Patient would like the video invitation sent by:  Text to cell phone: [phone]    Mode of Communication:  Video Conference via [employer]    Distant Location (Provider):  On-site    As the provider I attest to compliance with applicable laws and regulations related to telemedicine.    DATA  Interactive Complexity: No  Crisis: No        Progress Since Last Session (Related to Symptoms / Goals / Homework):   Symptoms: Worsening The patient reported a decrease in her overall functioning and an increase in both her anxious and depressive symptoms.    Homework:  Assigned in session.      Episode of Care Goals:  No care goals addressed due to the treatment plan being incomplete.     Current / Ongoing Stressors and Concerns:   The patient continued to report internal family stressors that have significantly impacted  "her way of being. She has started a new anti-depressant that she indicated has made her feel very \"disconnected\" and \"irritable\". The patient also reported that she has struggled with a lack of motivation, interest, and energy to do things that bring her kayleigh. She is going out of town to visit a friend of her's that she indicated she was \"excited\" to see.      Treatment Objective(s) Addressed in This Session:   No treatment objectives addressed this session due to the treatment plan being incomplete.       Intervention:   Motivational Interviewing: The therapist utilized open-ended questions to elicit responses from the patient regarding her current level of functioning. The patient reported decrease functioning due to \"not feeling good\" about her mental health and side effects that the patient believes are related to her new anti-depressant (fatigue, irritability, and disconnection). The writer affirmed the patient's felt emotions and provided both empathy and understanding as the patient processed ongoing life stressors. She noted that there are ongoing stressors between her and her partner that have remained unresolved. The writer then began to explore ways in which the patient is actively attempting to meet her own needs. The patient indicated that she is \"struggling\" to meet her own needs at the moment and is struggling to get enough sleep, eat three meals a day, and engage in physical activity. The writer then assessed the patient's willingness to change regarding this routine and validated the patient's change language around improving her mental health. The writer then discussed the importance of the patient caring for her own needs and then provided encouragement as the patient processed more life events.  Solution Focused: The writer assessed the patient for safety concerns. The patient indicated that she was experiencing passive SI that has not increased in intensity since the last session. The writer then " "encouraged the patient to utilize her safety plan if she felt like a danger to herself or others and reviewed safety resources with the patient. The writer also encouraged the patient to continue to come to therapy for building motivation to change and to learn new coping skills to manage life stressors.    Assessments completed prior to visit:  The following assessments were completed by patient for this visit:  PHQ9:       6/19/2023     6:58 AM 6/27/2023    10:05 AM 7/7/2023     2:17 PM 7/11/2023    11:33 AM 7/18/2023    12:22 PM 8/9/2023     9:54 AM   PHQ-9 SCORE   PHQ-9 Total Score MyChart 6 (Mild depression) 9 (Mild depression) 10 (Moderate depression) 11 (Moderate depression) 12 (Moderate depression) 14 (Moderate depression)   PHQ-9 Total Score 6 9 10 11 12 14     GAD7:       6/27/2023    10:16 AM 7/11/2023    11:33 AM 7/18/2023    12:23 PM 8/9/2023     9:55 AM   JM-7 SCORE   Total Score 11 (moderate anxiety) 12 (moderate anxiety) 15 (severe anxiety) 15 (severe anxiety)   Total Score 11 12 15 15         ASSESSMENT: Current Emotional / Mental Status (status of significant symptoms):   Risk status (Self / Other harm or suicidal ideation)   Patient denies current fears or concerns for personal safety.   Patient reports the following current or recent suicidal ideation or behaviors: Passive suicidal ideation that is \"on and off\". \"It's the same thoughts as usual, I just want all the crap in life to go away\". A safety plan has been agreed to with the writer and the patient contracted for safety in session, while also displaying future forward thinking.   Patient denies current or recent homicidal ideation or behaviors.   Patient denies current or recent self injurious behavior or ideation.   Patient denies other safety concerns.   Patient reports there has been no change in risk factors since their last session.     Patient reports there has been no change in protective factors since their last session.     A " "safety and risk management plan has been developed including: When the patient identifies the following:          [facility]                                        Viktoriya Lovelace             SAFETY PLAN:  Step 1: Warning signs / cues (Thoughts, images, mood, situation, behavior) that a crisis may be developing:  Thoughts: \"I am just so overwhelmed\".   Images: None at this time.   Thinking Processes: ruminations (can't stop thinking about my problems): feeling stuck on each emotion and racing thoughts  Mood: intense worry  Behaviors: isolating/withdrawing  and crying   Situations: relationship problems   Step 2: Coping strategies - Things I can do to take my mind off of my problems without contacting another person (relaxation technique, physical activity):  Distress Tolerance Strategies:  Utilizing grounding exercises and sensory experiences  Physical Activities: Coloring   Focus on helpful thoughts:  think about happy memories: the kids and pets   Step 3: People and social settings that provide distraction:              Name: Work    Phone: N/A               Name: Talha            Phone: The patient has [employer] phone number           Step 4: Remind myself of people and things that are important to me and worth living for:  The patients kids and pets.         Step 5: When I am in crisis, I can ask these people to help me use my safety plan:              Name: Talha            Phone: The patient has [employer] phone number              Name: [employer]    Phone: The patient has [employer] phone number                 Step 6: Making the environment safe:   remove drugs  Step 7: Professionals or agencies I can contact during a crisis:  Suicide Prevention Lifeline: Call or Text 988   Local Crisis Services: 911     Call 911 or go to my nearest emergency department.      I helped develop this safety plan and agree to use it when needed.  I have been given a copy of this plan.       Client signature " _________________________________________________________________  Today s date:  7/7/2023  Completed by Provider Name/ Credentials:  López Ernst, STEPHAN LGSW                       July 7, 2023  Adapted from Safety Plan Template 2008 Kitty Kaye and Ubaldo Pineda is reprinted with the express permission of the authors.  No portion of the Safety Plan Template may be reproduced without the express, written permission.  You can contact the authors at [email or web address] or [email or web address].          Appearance:   Appropriate    Eye Contact:   Good    Psychomotor Behavior: Normal    Attitude:   Cooperative  Interested Friendly   Orientation:   All   Speech    Rate / Production: Emotional    Volume:  Normal    Mood:    Anxious  Depressed    Affect:    Tearful   Thought Content:  Clear    Thought Form:  Coherent  Logical    Insight:    Good      Medication Review:   No changes to current psychiatric medication(s)     Medication Compliance:   Yes     Changes in Health Issues:   None reported     Chemical Use Review:   Substance Use: Chemical use reviewed, no active concerns identified      Tobacco Use: No current tobacco use.      Diagnosis:  1. Major depressive disorder, recurrent episode, moderate (H)    2. Generalized anxiety disorder        Collateral Reports Completed:   Routed note to PCP    PLAN: (Patient Tasks / Therapist Tasks / Other)  The patient will continue to contemplate goals and objectives that she would like to accomplish in therapy. She will also work on meeting her basic needs during this difficult time. The writer will continue to encourage the patient to come to therapy and review safety resources with the patient as needed.         López Ernst, STEPHAN LGSW           August 9th, 2023                                                         ______________________________________________________________________    Individual Treatment Plan    Patient's Name: Viktoriya SHANIKA Lovelace  Date Of  Birth: 1980    Date of Creation: 08/09/2023  Date Treatment Plan Last Reviewed/Revised: 08/09/2023    DSM5 Diagnoses: 296.32 (F33.1) Major Depressive Disorder, Recurrent Episode, Moderate _ or 300.02 (F41.1) Generalized Anxiety Disorder  Psychosocial / Contextual Factors: The patient reported concurrent ongoing family stressors that have significantly impacted the patients way of being. The patient is also in need of new housing in the next six months due to similar internal family stressors. The patient is experiencing maladaptive symptoms due to mental health medications and is consulting her primary care provider to address her concerns. Overall, the patient is experiencing significant symptoms of anxiety and depression including,  feeling overwhelmed with feelings of sadness and anxiety, difficulty sleeping and excessive sleep, frequent crying, avoiding social situations, and isolating. The patient also reported having little interest or pleasure in doing things, feeling down, feeling tired, a low sense of self-esteem, and difficulty concentrating. In terms of anxious symptoms, the patient reported feelings of being nervious, difficulty controlling worry, worrying too much about different things, trouble relaxing, and increased irritability.   PROMIS (reviewed every 90 days): Reviewed with the patient on 07/07/2023    Referral / Collaboration:  Referral to another professional/service is not indicated at this time..    Anticipated number of session for this episode of care: 9-12 sessions  Anticipation frequency of session: Biweekly  Anticipated Duration of each session: 38-52 minutes  Treatment plan will be reviewed in 90 days or when goals have been changed.       MeasurableTreatment Goal(s) related to diagnosis / functional impairment(s)  Goal 1: SI - Patient will - report absence of persistent SI and report of reduced frequency and intensity of SI and absence of SI to acceptable levels, report subjective  improved mood for a period of 90 days, within 6 months as clinically observed and by patient self-report    I will know I've met my goal when I can get rid of the SI and control it easier.      Objective #A (Patient Action)    Patient will Decrease thoughts that you'd be better off dead or of suicide / self-harm.  Status: Continued - Date(s): Continous    Intervention(s)  Therapist will  create a safety plan with the patient, educate the patient on available safety resources, and assist the patient in identifying triggers for SI .      Patient has reviewed and agreed to the above plan.      STEPHAN Alba [facility]  August 9, 2023

## 2023-08-21 ENCOUNTER — VIRTUAL VISIT (OUTPATIENT)
Dept: PSYCHOLOGY | Facility: CLINIC | Age: 43
End: 2023-08-21
Payer: OTHER GOVERNMENT

## 2023-08-21 DIAGNOSIS — F41.1 GENERALIZED ANXIETY DISORDER: ICD-10-CM

## 2023-08-21 DIAGNOSIS — F33.1 MAJOR DEPRESSIVE DISORDER, RECURRENT EPISODE, MODERATE (H): Primary | ICD-10-CM

## 2023-08-21 PROCEDURE — 90837 PSYTX W PT 60 MINUTES: CPT | Mod: VID

## 2023-08-21 NOTE — PROGRESS NOTES
Provider Attestation:   I have reviewed and discussed this client with López Ernst, MSW Intern, and agree with the findings in this note. I did not participate in a shared visit by interviewing or treating the patient.     [facility], [facility]  Date of Service (when I saw the patient): I did not personally see this patient today.      [facility]                                     Progress Note    Patient Name: Viktoriya Lovelace  Date: 08/21/2023         Service Type: Individual      Session Start Time: 10:59 AM  Session End Time: 11:53 AM     Session Length: 54 Minutes     Session #: 4    Attendees: Client attended alone    Service Modality:  Video Visit:      Provider verified identity through the following two step process.  Patient provided:  Patient is known previously to provider    Telemedicine Visit: The patient's condition can be safely assessed and treated via synchronous audio and visual telemedicine encounter.      Reason for Telemedicine Visit: Patient has requested telehealth visit    Originating Site (Patient Location): Patient's home    Distant Site (Provider Location): [facility]    Consent:  The patient/guardian has verbally consented to: the potential risks and benefits of telemedicine (video visit) versus in person care; bill my insurance or make self-payment for services provided; and responsibility for payment of non-covered services.     Patient would like the video invitation sent by:  Text to cell phone: [phone]    Mode of Communication:  Video Conference via SaveMeeting    Distant Location (Provider):  On-site    As the provider I attest to compliance with applicable laws and regulations related to telemedicine.    Extended Session (53+ minutes): PROLONGED SERVICE IN THE OUTPATIENT SETTING REQUIRING DIRECT (FACE-TO-FACE) PATIENT CONTACT BEYOND THE USUAL SERVICE:    - Longer session due to limited access  "to mental health appointments and necessity to address patient's distress / complexity    DATA  Interactive Complexity: No  Crisis: No        Progress Since Last Session (Related to Symptoms / Goals / Homework):   Symptoms: Improving The patient reported ongoing difficulty with her depressive and anxious symptoms, as well as internal family stressors. The patient did indicate that she is able to \"function\" on her current dosage of Lexapro, which has helped her to function \"better\" in the past two weeks.     Homework:  Continued      Episode of Care Goals:  No care goals addressed due to the treatment plan being incomplete.     Current / Ongoing Stressors and Concerns:   The patient continued to report internal family stressors that have significantly impacted her way of being. She has started a new anti-depressant that she indicated had made her feel very \"disconnected\" and \"irritable\". However, the patient noted at today's session that the medication has made her feel \"stable enough\" over the past two weeks. The patient also reported that she has struggled with a lack of motivation, interest, and energy to do things that bring her kayleigh. She recently went out of town to visit a friend, which she indicated was \"very fun\". Her son is having surgery at the end of September, which she indicated is stressful as well. The patient indicated that she will have to move out of her current home by October due to internal family stressors.      Treatment Objective(s) Addressed in This Session:   No treatment objectives addressed this session due to the treatment plan being incomplete.       Intervention:   Motivational Interviewing: The therapist utilized open-ended questions to elicit responses from the patient regarding her current level of functioning. The patient reported that she was \"still not feeling great\", however, she noted an increased sense of functioning at her new dosage of lexapro. The writer affirmed the " patient's felt emotions and provided both empathy and understanding as the patient processed ongoing life stressors. She noted that there are ongoing stressors between her and her partner that have remained unresolved. The writer explored with the patient the difficulties of her current situation with her  and identified the patient's desired outcomes for the situation. The writer and patient then prioritized goals for the patient to contemplate before next session (acquiring housing, internal family stressors, etc).   Solution Focused: The writer assessed the patient for safety concerns. The patient indicated that she was experiencing ongoing passive SI that has not increased in intensity since the last session. The writer then encouraged the patient to utilize her safety plan if she felt like a danger to herself or others and/or access emergency resources if needed. The writer also encouraged the patient to continue to come to therapy for building motivation to change and to learn new coping skills to manage life stressors.    Motivational Interviewing  Target Behavior:  Managing internal family stressors    Stage of Change: CONTEMPLATION (Considering change and yet undecided)    MI Intervention: Expressed Empathy/Understanding, Supported Autonomy, Collaboration, Evocation, Permission to raise concern or advise, Open-ended questions, Reflections: simple and complex, Rolled with resistance: Emphasized patient autonomy, Complex reflection, and Evoked patient agenda, Change talk (evoked), and Reframe     Change Talk Expressed by the Patient: Desire to change Ability to change Reasons to change Need to change Committment to change Activation    Provider Response to Change Talk: E - Evoked more info from patient about behavior change, A - Affirmed patient's thoughts, decisions, or attempts at behavior change, R - Reflected patient's change talk, and S - Summarized patient's change talk  "statements      Assessments completed prior to visit:  The following assessments were completed by patient for this visit:  PHQ9:       6/19/2023     6:58 AM 6/27/2023    10:05 AM 7/7/2023     2:17 PM 7/11/2023    11:33 AM 7/18/2023    12:22 PM 8/9/2023     9:54 AM 8/21/2023    10:57 AM   PHQ-9 SCORE   PHQ-9 Total Score MyChart 6 (Mild depression) 9 (Mild depression) 10 (Moderate depression) 11 (Moderate depression) 12 (Moderate depression) 14 (Moderate depression) 10 (Moderate depression)   PHQ-9 Total Score 6 9 10 11 12 14 10     GAD7:       6/27/2023    10:16 AM 7/11/2023    11:33 AM 7/18/2023    12:23 PM 8/9/2023     9:55 AM   JM-7 SCORE   Total Score 11 (moderate anxiety) 12 (moderate anxiety) 15 (severe anxiety) 15 (severe anxiety)   Total Score 11 12 15 15         ASSESSMENT: Current Emotional / Mental Status (status of significant symptoms):   Risk status (Self / Other harm or suicidal ideation)   Patient denies current fears or concerns for personal safety.   Patient reports the following current or recent suicidal ideation or behaviors: Passive suicidal ideation that is \"on and off\". \"It's the same thoughts as usual, I just want all the crap in life to go away\". A safety plan has been agreed to with the writer and the patient contracted for safety in session, while also displaying future forward thinking.   Patient denies current or recent homicidal ideation or behaviors.   Patient denies current or recent self injurious behavior or ideation.   Patient denies other safety concerns.   Patient reports there has been no change in risk factors since their last session.     Patient reports there has been no change in protective factors since their last session.     A safety and risk management plan has been developed:       [facility] Ángel Lovelace             SAFETY PLAN:  Step 1: Warning signs / cues (Thoughts, images, mood, situation, behavior) " "that a crisis may be developing:  Thoughts: \"I am just so overwhelmed\".   Images: None at this time.   Thinking Processes: ruminations (can't stop thinking about my problems): feeling stuck on each emotion and racing thoughts  Mood: intense worry  Behaviors: isolating/withdrawing  and crying   Situations: relationship problems   Step 2: Coping strategies - Things I can do to take my mind off of my problems without contacting another person (relaxation technique, physical activity):  Distress Tolerance Strategies:  Utilizing grounding exercises and sensory experiences  Physical Activities: Coloring   Focus on helpful thoughts:  think about happy memories: the kids and pets   Step 3: People and social settings that provide distraction:              Name: Work    Phone: N/A               Name: Talha            Phone: The patient has [employer]'s phone number           Step 4: Remind myself of people and things that are important to me and worth living for:  The patients kids and pets.         Step 5: When I am in crisis, I can ask these people to help me use my safety plan:              Name: Talha            Phone: The patient has [employer]'s phone number              Name: [employer]    Phone: The patient has [employer] phone number                 Step 6: Making the environment safe:   remove drugs  Step 7: Professionals or agencies I can contact during a crisis:  Suicide Prevention Lifeline: Call or Text 988   Local Crisis Services: 911     Call 911 or go to my nearest emergency department.      I helped develop this safety plan and agree to use it when needed.  I have been given a copy of this plan.       Client signature _________________________________________________________________  Today s date:  7/7/2023  Completed by Provider Name/ Credentials:  STEPHAN Lebron SW                       July 7, 2023  Adapted from Safety Plan Template 2008 Kitty Kaye and Ubaldo Pineda is reprinted with the express permission " "of the authors.  No portion of the Safety Plan Template may be reproduced without the express, written permission.  You can contact the authors at bhs@[email or web address] or [email or web address].          Appearance:   Appropriate    Eye Contact:   Good    Psychomotor Behavior: Normal    Attitude:   Cooperative  Interested Friendly   Orientation:   All   Speech    Rate / Production: Emotional    Volume:  Normal    Mood:    Anxious  Depressed    Affect:    Tearful   Thought Content:  Clear    Thought Form:  Coherent  Logical    Insight:    Good      Medication Review:   No changes to current psychiatric medication(s)     Medication Compliance:   Yes     Changes in Health Issues:   None reported     Chemical Use Review:   Substance Use: Chemical use reviewed, no active concerns identified      Tobacco Use: No current tobacco use.      Diagnosis:  1. Major depressive disorder, recurrent episode, moderate (H)    2. Generalized anxiety disorder          Collateral Reports Completed:   Routed note to PCP    PLAN: (Patient Tasks / Therapist Tasks / Other)  The patient is to continue assessing different options for housing, while \"meeting my needs where I am at\" before next session. The writer will continue to assist the patient is obtaining her goals, while also attempting to increase the patient's motivation for change. The writer also reviewed safety concerns and resources at this session.         STEPHAN Alba LGSW           August 21st, 2023                                                         ______________________________________________________________________    Individual Treatment Plan    Patient's Name: Viktoriya Lovelace  YOB: 1980    Date of Creation: 08/09/2023  Date Treatment Plan Last Reviewed/Revised: 08/21/2023    DSM5 Diagnoses: 296.32 (F33.1) Major Depressive Disorder, Recurrent Episode, Moderate _ or 300.02 (F41.1) Generalized Anxiety Disorder  Psychosocial / Contextual " Factors: The patient reported concurrent ongoing family stressors that have significantly impacted the patients way of being. The patient is also in need of new housing in the next six months due to similar internal family stressors. The patient is experiencing maladaptive symptoms due to mental health medications and is consulting her primary care provider to address her concerns. Overall, the patient is experiencing significant symptoms of anxiety and depression including,  feeling overwhelmed with feelings of sadness and anxiety, difficulty sleeping and excessive sleep, frequent crying, avoiding social situations, and isolating. The patient also reported having little interest or pleasure in doing things, feeling down, feeling tired, a low sense of self-esteem, and difficulty concentrating. In terms of anxious symptoms, the patient reported feelings of being nervious, difficulty controlling worry, worrying too much about different things, trouble relaxing, and increased irritability.   PROMIS (reviewed every 90 days): Reviewed with the patient on 07/07/2023    Referral / Collaboration:  Referral to another professional/service is not indicated at this time..    Anticipated number of session for this episode of care: 9-12 sessions  Anticipation frequency of session: Biweekly  Anticipated Duration of each session: 38-52 minutes  Treatment plan will be reviewed in 90 days or when goals have been changed.       MeasurableTreatment Goal(s) related to diagnosis / functional impairment(s)  Goal 1: SI - Patient will - report absence of persistent SI and report of reduced frequency and intensity of SI and absence of SI to acceptable levels, report subjective improved mood for a period of 90 days, within 6 months as clinically observed and by patient self-report    I will know I've met my goal when I can get rid of the SI and control it easier.      Objective #A (Patient Action)    Patient will Decrease thoughts that you'd  be better off dead or of suicide / self-harm.  Status: Continued - Date(s): Continous    Intervention(s)  Therapist will  create a safety plan with the patient, educate the patient on available safety resources, and assist the patient in identifying triggers for SI .      Patient has reviewed and agreed to the above plan.      STEPHAN Alba [facility]  August 21st, 2023

## 2023-08-28 ENCOUNTER — VIRTUAL VISIT (OUTPATIENT)
Dept: PSYCHOLOGY | Facility: CLINIC | Age: 43
End: 2023-08-28
Payer: OTHER GOVERNMENT

## 2023-08-28 DIAGNOSIS — F33.1 MAJOR DEPRESSIVE DISORDER, RECURRENT EPISODE, MODERATE (H): Primary | ICD-10-CM

## 2023-08-28 DIAGNOSIS — F41.1 GENERALIZED ANXIETY DISORDER: ICD-10-CM

## 2023-08-28 PROCEDURE — 90837 PSYTX W PT 60 MINUTES: CPT | Mod: VID

## 2023-08-28 ASSESSMENT — PATIENT HEALTH QUESTIONNAIRE - PHQ9
SUM OF ALL RESPONSES TO PHQ QUESTIONS 1-9: 11
10. IF YOU CHECKED OFF ANY PROBLEMS, HOW DIFFICULT HAVE THESE PROBLEMS MADE IT FOR YOU TO DO YOUR WORK, TAKE CARE OF THINGS AT HOME, OR GET ALONG WITH OTHER PEOPLE: VERY DIFFICULT
SUM OF ALL RESPONSES TO PHQ QUESTIONS 1-9: 11

## 2023-08-28 ASSESSMENT — ANXIETY QUESTIONNAIRES
GAD7 TOTAL SCORE: 16
GAD7 TOTAL SCORE: 16
7. FEELING AFRAID AS IF SOMETHING AWFUL MIGHT HAPPEN: MORE THAN HALF THE DAYS
3. WORRYING TOO MUCH ABOUT DIFFERENT THINGS: MORE THAN HALF THE DAYS
1. FEELING NERVOUS, ANXIOUS, OR ON EDGE: NEARLY EVERY DAY
5. BEING SO RESTLESS THAT IT IS HARD TO SIT STILL: SEVERAL DAYS
2. NOT BEING ABLE TO STOP OR CONTROL WORRYING: MORE THAN HALF THE DAYS
6. BECOMING EASILY ANNOYED OR IRRITABLE: NEARLY EVERY DAY
IF YOU CHECKED OFF ANY PROBLEMS ON THIS QUESTIONNAIRE, HOW DIFFICULT HAVE THESE PROBLEMS MADE IT FOR YOU TO DO YOUR WORK, TAKE CARE OF THINGS AT HOME, OR GET ALONG WITH OTHER PEOPLE: VERY DIFFICULT
4. TROUBLE RELAXING: NEARLY EVERY DAY

## 2023-08-28 NOTE — PROGRESS NOTES
"    [facility]                                     Progress Note    Patient Name: Viktoriya Lovelace  Date: 08/28/2023         Service Type: Individual      Session Start Time:  10:58 AM  Session End Time: 11:54 AM     Session Length: 56 Minutes     Session #: 5    Attendees: Client attended alone    Service Modality:  Video Visit:      Provider verified identity through the following two step process.  Patient provided:  Patient is known previously to provider    Telemedicine Visit: The patient's condition can be safely assessed and treated via synchronous audio and visual telemedicine encounter.      Reason for Telemedicine Visit: Patient has requested telehealth visit    Originating Site (Patient Location): Patient's home    Distant Site (Provider Location): [facility]    Consent:  The patient/guardian has verbally consented to: the potential risks and benefits of telemedicine (video visit) versus in person care; bill my insurance or make self-payment for services provided; and responsibility for payment of non-covered services.     Patient would like the video invitation sent by:  Text to cell phone: [phone]    Mode of Communication:  Video Conference via [employer]    Distant Location (Provider):  On-site    As the provider I attest to compliance with applicable laws and regulations related to telemedicine.    Extended Session (53+ minutes): PROLONGED SERVICE IN THE OUTPATIENT SETTING REQUIRING DIRECT (FACE-TO-FACE) PATIENT CONTACT BEYOND THE USUAL SERVICE:        DATA  Interactive Complexity: No  Crisis: No        Progress Since Last Session (Related to Symptoms / Goals / Homework):   Symptoms: Worsening The patient reported an overall decrease in her functioning. She also reported that the MH medication is making her \"irritable and more agitated\".    Homework:  Continued      Episode of Care Goals: Minimal progress - CONTEMPLATION " "(Considering change and yet undecided); Intervened by assessing the negative and positive thinking (ambivalence) about behavior change     Current / Ongoing Stressors and Concerns:   The patient continued to report internal family stressors that have significantly impacted her way of being. The anti-depressant that she is taking continues to make her feel \"irritable and agitated\". The patient also reported that she has struggled with a lack of motivation, interest, and energy to do things that bring her kayleigh. Her son is having surgery at the end of September, which she indicated is stressful as well. The patient indicated that she will have to move out of her current home by October due to internal family stressors.      Treatment Objective(s) Addressed in This Session:   Decrease thoughts that you'd be better off dead or of suicide / self-harm       Intervention:   Solution Focused: The writer assessed the patient for safety concerns. The patient indicated that she was experiencing ongoing passive SI that has not changed since the last meeting with the therapist. The writer continued to encourage the patient to utilize her safety plan if she felt like a danger to herself or others and/or access emergency resources if needed. The writer also encouraged the patient to continue to come to therapy for building motivation to change and to learn new coping skills to manage life stressors.    Motivational Interviewing  Target Behavior:  Managing internal family stressors    Stage of Change: CONTEMPLATION (Considering change and yet undecided)    MI Intervention: Expressed Empathy/Understanding, Supported Autonomy, Collaboration, Evocation, Permission to raise concern or advise, Open-ended questions, Reflections: simple and complex, Rolled with resistance: Emphasized patient autonomy, Complex reflection, and Evoked patient agenda, Change talk (evoked), and Reframe     Change Talk Expressed by the Patient: Reasons to change " "Need to change    Provider Response to Change Talk: E - Evoked more info from patient about behavior change, A - Affirmed patient's thoughts, decisions, or attempts at behavior change, R - Reflected patient's change talk, and S - Summarized patient's change talk statements      Assessments completed prior to visit:  The following assessments were completed by patient for this visit:  PHQ9:       6/27/2023    10:05 AM 7/7/2023     2:17 PM 7/11/2023    11:33 AM 7/18/2023    12:22 PM 8/9/2023     9:54 AM 8/21/2023    10:57 AM 8/28/2023    10:53 AM   PHQ-9 SCORE   PHQ-9 Total Score MyChart 9 (Mild depression) 10 (Moderate depression) 11 (Moderate depression) 12 (Moderate depression) 14 (Moderate depression) 10 (Moderate depression) 11 (Moderate depression)   PHQ-9 Total Score 9 10 11 12 14 10 11     GAD7:       6/27/2023    10:16 AM 7/11/2023    11:33 AM 7/18/2023    12:23 PM 8/9/2023     9:55 AM 8/28/2023    10:54 AM   JM-7 SCORE   Total Score 11 (moderate anxiety) 12 (moderate anxiety) 15 (severe anxiety) 15 (severe anxiety) 16 (severe anxiety)   Total Score 11 12 15 15 16         ASSESSMENT: Current Emotional / Mental Status (status of significant symptoms):   Risk status (Self / Other harm or suicidal ideation)   Patient denies current fears or concerns for personal safety.   Patient reports the following current or recent suicidal ideation or behaviors: Passive suicidal ideation that is \"on and off\". \"It's the same thoughts as usual, I just want all the crap in life to go away\". A safety plan has been agreed to with the writer and the patient contracted for safety in session, while also displaying future forward thinking.   Patient denies current or recent homicidal ideation or behaviors.   Patient denies current or recent self injurious behavior or ideation.   Patient denies other safety concerns.   Patient reports there has been no change in risk factors since their last session.     Patient reports there has " "been no change in protective factors since their last session.     A safety and risk management plan has been developed including: When the patient identifies the following:          [facility]                                        Viktoriya Lovelace             SAFETY PLAN:  Step 1: Warning signs / cues (Thoughts, images, mood, situation, behavior) that a crisis may be developing:  Thoughts: \"I am just so overwhelmed\".   Images: None at this time.   Thinking Processes: ruminations (can't stop thinking about my problems): feeling stuck on each emotion and racing thoughts  Mood: intense worry  Behaviors: isolating/withdrawing  and crying   Situations: relationship problems   Step 2: Coping strategies - Things I can do to take my mind off of my problems without contacting another person (relaxation technique, physical activity):  Distress Tolerance Strategies:  Utilizing grounding exercises and sensory experiences  Physical Activities: Coloring   Focus on helpful thoughts:  think about happy memories: the kids and pets   Step 3: People and social settings that provide distraction:              Name: Work    Phone: N/A               Name: Talha            Phone: The patient has [employer]'s phone number           Step 4: Remind myself of people and things that are important to me and worth living for:  The patients kids and pets.         Step 5: When I am in crisis, I can ask these people to help me use my safety plan:              Name: Talha            Phone: The patient has [employer]'s phone number              Name: Amy    Phone: The patient has [employer]'s phone number                 Step 6: Making the environment safe:   remove drugs  Step 7: Professionals or agencies I can contact during a crisis:  Suicide Prevention Lifeline: Call or Text 988   Local Crisis Services: 911     Call 911 or go to my nearest emergency department.      I helped develop this safety plan and agree to use it when needed.  I " "have been given a copy of this plan.       Client signature _________________________________________________________________  Today s date:  7/7/2023  Completed by Provider Name/ Credentials:  STEPHAN Lebron LGSW                       July 7, 2023  Adapted from Safety Plan Template 2008 Kitty Kaye and Ubaldo Pineda is reprinted with the express permission of the authors.  No portion of the Safety Plan Template may be reproduced without the express, written permission.  You can contact the authors at [email or web address] or saúl@[email or web address].          Appearance:   Appropriate    Eye Contact:   Good    Psychomotor Behavior: Normal    Attitude:   Cooperative  Interested Friendly   Orientation:   All   Speech    Rate / Production: Emotional    Volume:  Normal    Mood:    Anxious  Depressed    Affect:    Tearful   Thought Content:  Clear    Thought Form:  Coherent  Logical    Insight:    Good      Medication Review:   No changes to current psychiatric medication(s)     Medication Compliance:   Yes     Changes in Health Issues:   None reported     Chemical Use Review:   Substance Use: Chemical use reviewed, no active concerns identified      Tobacco Use: No current tobacco use.      Diagnosis:  1. Major depressive disorder, recurrent episode, moderate (H)    2. Generalized anxiety disorder            Collateral Reports Completed:   Routed note to PCP    PLAN: (Patient Tasks / Therapist Tasks / Other)  The patient is to continue contemplating the goals and objectives that she would like to accomplish through therapy, while also exploring \"how I want my life to look like\". The writer will continue to assist the patient in obtaining her goals and exploring new coping skills for her to utilize.         López Ernst, STEPHAN LGSW           August 28th, 2023                                                         ______________________________________________________________________    Individual " Treatment Plan    Patient's Name: Viktoriya Lovelace  YOB: 1980    Date of Creation: 08/09/2023  Date Treatment Plan Last Reviewed/Revised: 08/28/2023    DSM5 Diagnoses: 296.32 (F33.1) Major Depressive Disorder, Recurrent Episode, Moderate _ or 300.02 (F41.1) Generalized Anxiety Disorder  Psychosocial / Contextual Factors: The patient reported concurrent ongoing family stressors that have significantly impacted the patients way of being. The patient is also in need of new housing in the next six months due to similar internal family stressors. The patient is experiencing maladaptive symptoms due to mental health medications and is consulting her primary care provider to address her concerns. Overall, the patient is experiencing significant symptoms of anxiety and depression including,  feeling overwhelmed with feelings of sadness and anxiety, difficulty sleeping and excessive sleep, frequent crying, avoiding social situations, and isolating. The patient also reported having little interest or pleasure in doing things, feeling down, feeling tired, a low sense of self-esteem, and difficulty concentrating. In terms of anxious symptoms, the patient reported feelings of being nervious, difficulty controlling worry, worrying too much about different things, trouble relaxing, and increased irritability.   PROMIS (reviewed every 90 days): Reviewed with the patient on 07/07/2023    Referral / Collaboration:  Referral to another professional/service is not indicated at this time..    Anticipated number of session for this episode of care: 9-12 sessions  Anticipation frequency of session: Biweekly  Anticipated Duration of each session: 38-52 minutes  Treatment plan will be reviewed in 90 days or when goals have been changed.       MeasurableTreatment Goal(s) related to diagnosis / functional impairment(s)  Goal 1: SI - Patient will - report absence of persistent SI and report of reduced frequency and intensity of  SI and absence of SI to acceptable levels, report subjective improved mood for a period of 90 days, within 6 months as clinically observed and by patient self-report    I will know I've met my goal when I can get rid of the SI and control it easier.      Objective #A (Patient Action)    Patient will Decrease thoughts that you'd be better off dead or of suicide / self-harm.  Status: Continued - Date(s): Continous    Intervention(s)  Therapist will  create a safety plan with the patient, educate the patient on available safety resources, and assist the patient in identifying triggers for SI .      Patient has reviewed and agreed to the above plan.      STEPHAN [facility]  August 28th, 2023

## 2023-09-18 ENCOUNTER — VIRTUAL VISIT (OUTPATIENT)
Dept: PSYCHOLOGY | Facility: CLINIC | Age: 43
End: 2023-09-18
Payer: OTHER GOVERNMENT

## 2023-09-18 DIAGNOSIS — F41.1 GENERALIZED ANXIETY DISORDER: ICD-10-CM

## 2023-09-18 DIAGNOSIS — F33.1 MAJOR DEPRESSIVE DISORDER, RECURRENT EPISODE, MODERATE (H): Primary | ICD-10-CM

## 2023-09-18 PROCEDURE — 90837 PSYTX W PT 60 MINUTES: CPT | Mod: VID

## 2023-09-18 ASSESSMENT — ANXIETY QUESTIONNAIRES
6. BECOMING EASILY ANNOYED OR IRRITABLE: NEARLY EVERY DAY
7. FEELING AFRAID AS IF SOMETHING AWFUL MIGHT HAPPEN: MORE THAN HALF THE DAYS
4. TROUBLE RELAXING: MORE THAN HALF THE DAYS
3. WORRYING TOO MUCH ABOUT DIFFERENT THINGS: SEVERAL DAYS
IF YOU CHECKED OFF ANY PROBLEMS ON THIS QUESTIONNAIRE, HOW DIFFICULT HAVE THESE PROBLEMS MADE IT FOR YOU TO DO YOUR WORK, TAKE CARE OF THINGS AT HOME, OR GET ALONG WITH OTHER PEOPLE: VERY DIFFICULT
5. BEING SO RESTLESS THAT IT IS HARD TO SIT STILL: NOT AT ALL
1. FEELING NERVOUS, ANXIOUS, OR ON EDGE: MORE THAN HALF THE DAYS
GAD7 TOTAL SCORE: 12
GAD7 TOTAL SCORE: 12
2. NOT BEING ABLE TO STOP OR CONTROL WORRYING: MORE THAN HALF THE DAYS

## 2023-09-18 NOTE — PROGRESS NOTES
[facility]                                     Progress Note    Patient Name: Viktoriya Lovelace  Date: 08/28/2023         Service Type: Individual      Session Start Time:  11:02 AM  Session End Time: 12:00 PM     Session Length: 58 Minutes     Session #: 6    Attendees: Client attended alone    Service Modality:  Video Visit:      Provider verified identity through the following two step process.  Patient provided:  Patient is known previously to provider    Telemedicine Visit: The patient's condition can be safely assessed and treated via synchronous audio and visual telemedicine encounter.      Reason for Telemedicine Visit: Patient has requested telehealth visit    Originating Site (Patient Location): Patient's home    Distant Site (Provider Location): [facility]    Consent:  The patient/guardian has verbally consented to: the potential risks and benefits of telemedicine (video visit) versus in person care; bill my insurance or make self-payment for services provided; and responsibility for payment of non-covered services.     Patient would like the video invitation sent by:  Text to cell phone: [phone]    Mode of Communication:  Video Conference via Amwell    Distant Location (Provider):  On-site    As the provider I attest to compliance with applicable laws and regulations related to telemedicine.    Extended Session (53+ minutes): PROLONGED SERVICE IN THE OUTPATIENT SETTING REQUIRING DIRECT (FACE-TO-FACE) PATIENT CONTACT BEYOND THE USUAL SERVICE:        DATA  Interactive Complexity: No  Crisis: No        Progress Since Last Session (Related to Symptoms / Goals / Homework):   Symptoms: No change The patient continues to report ongoing difficulty with depressive symptoms and internal family stressors.     Homework:  Continued      Episode of Care Goals: Minimal progress - CONTEMPLATION (Considering change and yet undecided);  "Intervened by assessing the negative and positive thinking (ambivalence) about behavior change     Current / Ongoing Stressors and Concerns:   The patient continued to report internal family stressors that have significantly impacted her way of being. The anti-depressant that she is taking continues to make her feel \"irritable and agitated\". The patient also reported that she has struggled with a lack of motivation, interest, and energy to do things that bring her kayleigh. Her son is having surgery at the end of September, which she indicated is stressful as well. Recently, the patient's  indicated that he is coming back to [place] and moving back into the house that the patient is staying in by the beginning of October/end of September. The patient noted this to be distressing, as she is now working very hard to find a new place to stay.        Treatment Objective(s) Addressed in This Session:   Decrease thoughts that you'd be better off dead or of suicide / self-harm  compile a list of boundaries that they would like to set with others. The patient will identify at least three boundaries that she would like to establish within her social relationships.     Intervention:   Solution Focused: The writer assessed the patient for safety concerns. The patient indicated that she was experiencing ongoing passive SI that has not changed since the last meeting with the therapist. The writer continued to encourage the patient to utilize her safety plan if she felt like a danger to herself or others and/or access emergency resources if needed. The patient indicated that she has to now be out of her house by the end of the month. The writer affirmed the difficulty of the patient's situation and utilized open-ended questions to assist the patient in weighing the pro's and con's of each living situation option. The writer then utilized complex reflections and summative statements to support the patient's self-autonomy in " deciding her next place to live. The writer also encouraged the patient to continue to come to therapy for building motivation to change and to learn new coping skills to manage life stressors.    Motivational Interviewing  Target Behavior:  Decreasing internal family stressors and boundary setting    Stage of Change: PREPARATION (Decided to change - considering how)    MI Intervention: Expressed Empathy/Understanding, Supported Autonomy, Collaboration, Evocation, Permission to raise concern or advise, Open-ended questions, Reflections: simple and complex, Rolled with resistance: Emphasized patient autonomy, Complex reflection, and Evoked patient agenda, Change talk (evoked), and Reframe     Change Talk Expressed by the Patient: Desire to change Reasons to change Need to change    Provider Response to Change Talk: E - Evoked more info from patient about behavior change, A - Affirmed patient's thoughts, decisions, or attempts at behavior change, R - Reflected patient's change talk, and S - Summarized patient's change talk statements      Assessments completed prior to visit:  The following assessments were completed by patient for this visit:  PHQ9:       6/27/2023    10:05 AM 7/7/2023     2:17 PM 7/11/2023    11:33 AM 7/18/2023    12:22 PM 8/9/2023     9:54 AM 8/21/2023    10:57 AM 8/28/2023    10:53 AM   PHQ-9 SCORE   PHQ-9 Total Score MyChart 9 (Mild depression) 10 (Moderate depression) 11 (Moderate depression) 12 (Moderate depression) 14 (Moderate depression) 10 (Moderate depression) 11 (Moderate depression)   PHQ-9 Total Score 9 10 11 12 14 10 11     GAD7:       6/27/2023    10:16 AM 7/11/2023    11:33 AM 7/18/2023    12:23 PM 8/9/2023     9:55 AM 8/28/2023    10:54 AM 9/18/2023    10:58 AM   JM-7 SCORE   Total Score 11 (moderate anxiety) 12 (moderate anxiety) 15 (severe anxiety) 15 (severe anxiety) 16 (severe anxiety) 12 (moderate anxiety)   Total Score 11 12 15 15 16 12         ASSESSMENT: Current Emotional /  "Mental Status (status of significant symptoms):   Risk status (Self / Other harm or suicidal ideation)   Patient denies current fears or concerns for personal safety.   Patient reports the following current or recent suicidal ideation or behaviors: Passive suicidal ideation that is \"on and off\". \"It's the same thoughts as usual, I just want all the crap in life to go away\". A safety plan has been agreed to with the writer and the patient contracted for safety in session, while also displaying future forward thinking.   Patient denies current or recent homicidal ideation or behaviors.   Patient denies current or recent self injurious behavior or ideation.   Patient denies other safety concerns.   Patient reports there has been no change in risk factors since their last session.     Patient reports there has been no change in protective factors since their last session.     A safety and risk management plan has been developed including: When the patient identifies the following:          [facility]                                        Viktoriya Lovelace             SAFETY PLAN:  Step 1: Warning signs / cues (Thoughts, images, mood, situation, behavior) that a crisis may be developing:  Thoughts: \"I am just so overwhelmed\".   Images: None at this time.   Thinking Processes: ruminations (can't stop thinking about my problems): feeling stuck on each emotion and racing thoughts  Mood: intense worry  Behaviors: isolating/withdrawing  and crying   Situations: relationship problems   Step 2: Coping strategies - Things I can do to take my mind off of my problems without contacting another person (relaxation technique, physical activity):  Distress Tolerance Strategies:  Utilizing grounding exercises and sensory experiences  Physical Activities: Coloring   Focus on helpful thoughts:  think about happy memories: the kids and pets   Step 3: People and social settings that provide distraction:              Name: " Work    Phone: N/A               Name: Talha            Phone: The patient has Talha's phone number           Step 4: Remind myself of people and things that are important to me and worth living for:  The patients kids and pets.         Step 5: When I am in crisis, I can ask these people to help me use my safety plan:              Name: Talha            Phone: The patient has Talha's phone number              Name: Amy    Phone: The patient has Amy's phone number                 Step 6: Making the environment safe:   remove drugs  Step 7: Professionals or agencies I can contact during a crisis:  Suicide Prevention Lifeline: Call or Text 988   Local Crisis Services: 911     Call 911 or go to my nearest emergency department.      I helped develop this safety plan and agree to use it when needed.  I have been given a copy of this plan.       Client signature _________________________________________________________________  Today s date:  7/7/2023  Completed by Provider Name/ Credentials:  STEPHAN Lebron LGSW                       July 7, 2023  Adapted from Safety Plan Template 2008 Kitty Kaye and Ubaldo Pineda is reprinted with the express permission of the authors.  No portion of the Safety Plan Template may be reproduced without the express, written permission.  You can contact the authors at [email or web address] or [email or web address].          Appearance:   Appropriate    Eye Contact:   Good    Psychomotor Behavior: Normal    Attitude:   Cooperative  Interested Friendly   Orientation:   All   Speech    Rate / Production: Emotional    Volume:  Normal    Mood:    Anxious  Depressed    Affect:    Tearful   Thought Content:  Clear    Thought Form:  Coherent  Logical    Insight:    Good      Medication Review:   No changes to current psychiatric medication(s)     Medication Compliance:   Yes     Changes in Health Issues:   None reported     Chemical Use Review:   Substance Use: Chemical use  reviewed, no active concerns identified      Tobacco Use: No current tobacco use.      Diagnosis:  1. Major depressive disorder, recurrent episode, moderate (H)    2. Generalized anxiety disorder              Collateral Reports Completed:   Routed note to PCP    PLAN: (Patient Tasks / Therapist Tasks / Other)  The patient is to begin contemplating where she can establish boundaries within her internal family system, while also balancing stressors (including moving, her son's surgery, and communication with her ). The writer will continue to assist the patient in processing ongoing internal family stressors and pursuing additional goals to attain through therapy.         STEPHAN [facility]ba [facility]           August 28th, 2023                                                         ______________________________________________________________________    Individual Treatment Plan    Patient's Name: Viktoriya Lovelace  YOB: 1980    Date of Creation: 08/09/2023  Date Treatment Plan Last Reviewed/Revised: 08/28/2023    DSM5 Diagnoses: 296.32 (F33.1) Major Depressive Disorder, Recurrent Episode, Moderate _ or 300.02 (F41.1) Generalized Anxiety Disorder  Psychosocial / Contextual Factors: The patient reported concurrent ongoing family stressors that have significantly impacted the patients way of being. The patient is also in need of new housing in the next six months due to similar internal family stressors. The patient is experiencing maladaptive symptoms due to mental health medications and is consulting her primary care provider to address her concerns. Overall, the patient is experiencing significant symptoms of anxiety and depression including,  feeling overwhelmed with feelings of sadness and anxiety, difficulty sleeping and excessive sleep, frequent crying, avoiding social situations, and isolating. The patient also reported having little interest or pleasure in doing things, feeling down,  feeling tired, a low sense of self-esteem, and difficulty concentrating. In terms of anxious symptoms, the patient reported feelings of being nervious, difficulty controlling worry, worrying too much about different things, trouble relaxing, and increased irritability.   PROMIS (reviewed every 90 days): Reviewed with the patient on 07/07/2023    Referral / Collaboration:  Referral to another professional/service is not indicated at this time..    Anticipated number of session for this episode of care: 9-12 sessions  Anticipation frequency of session: Biweekly  Anticipated Duration of each session: 38-52 minutes  Treatment plan will be reviewed in 90 days or when goals have been changed.       MeasurableTreatment Goal(s) related to diagnosis / functional impairment(s)  Goal 1: SI - Patient will - report absence of persistent SI and report of reduced frequency and intensity of SI and absence of SI to acceptable levels, report subjective improved mood for a period of 90 days, within 6 months as clinically observed and by patient self-report    I will know I've met my goal when I can get rid of the SI and control it easier.      Objective #A (Patient Action)    Patient will Decrease thoughts that you'd be better off dead or of suicide / self-harm.  Status: Continued - Date(s): Continous    Intervention(s)  Therapist will  create a safety plan with the patient, educate the patient on available safety resources, and assist the patient in identifying triggers for SI .    Goal 2 Coping Skills: Client will report ability to utilize coping skills to acceptable levels, report subjective improved self-care for a period of 90 days, within 6 months as clinically observed and by patient self-report     I will know I've met my goal when I have my life back and feel like I can be happy again with all of the stress under control.      Objective #A (Client Action)    Client will compile a list of boundaries that they would like to set  with others. The patient will identify at least three boundaries that she would like to establish within her social relationships .  Status: Continued - Date(s): 09/18/2023 - 10/03/2023    Intervention(s)  Therapist will teach about healthy boundaries. The writer will discuss healthy boundaries with the patient and the importance of boundary setting .        Patient has reviewed and agreed to the above plan.      STEPHAN Alba  September 18th, 2023

## 2023-09-19 ENCOUNTER — E-VISIT (OUTPATIENT)
Dept: FAMILY MEDICINE | Facility: CLINIC | Age: 43
End: 2023-09-19
Payer: OTHER GOVERNMENT

## 2023-09-19 DIAGNOSIS — F32.1 CURRENT MODERATE EPISODE OF MAJOR DEPRESSIVE DISORDER WITHOUT PRIOR EPISODE (H): Primary | ICD-10-CM

## 2023-09-19 PROCEDURE — 99207 PR NON-BILLABLE SERV PER CHARTING: CPT | Performed by: STUDENT IN AN ORGANIZED HEALTH CARE EDUCATION/TRAINING PROGRAM

## 2023-09-19 ASSESSMENT — ANXIETY QUESTIONNAIRES
1. FEELING NERVOUS, ANXIOUS, OR ON EDGE: NEARLY EVERY DAY
3. WORRYING TOO MUCH ABOUT DIFFERENT THINGS: NEARLY EVERY DAY
7. FEELING AFRAID AS IF SOMETHING AWFUL MIGHT HAPPEN: SEVERAL DAYS
4. TROUBLE RELAXING: MORE THAN HALF THE DAYS
5. BEING SO RESTLESS THAT IT IS HARD TO SIT STILL: SEVERAL DAYS
6. BECOMING EASILY ANNOYED OR IRRITABLE: NEARLY EVERY DAY
GAD7 TOTAL SCORE: 16
2. NOT BEING ABLE TO STOP OR CONTROL WORRYING: NEARLY EVERY DAY
GAD7 TOTAL SCORE: 16

## 2023-09-20 ASSESSMENT — PATIENT HEALTH QUESTIONNAIRE - PHQ9: SUM OF ALL RESPONSES TO PHQ QUESTIONS 1-9: 11

## 2023-09-20 ASSESSMENT — ANXIETY QUESTIONNAIRES: GAD7 TOTAL SCORE: 16

## 2023-09-20 NOTE — PROGRESS NOTES
Patient is endorsing suicidal ideation.  It does not look like this is new, however she is Dr. Mg's patient and I am not certain if this is a new concern.  He had recommended that she schedule a visit with him.  Can you please reach out to patient to assess safety and to assist in scheduling first available opening with him, even if it means using a schedule block?  VJ

## 2023-09-28 ENCOUNTER — VIRTUAL VISIT (OUTPATIENT)
Dept: FAMILY MEDICINE | Facility: CLINIC | Age: 43
End: 2023-09-28
Payer: OTHER GOVERNMENT

## 2023-09-28 DIAGNOSIS — F41.1 GAD (GENERALIZED ANXIETY DISORDER): Primary | ICD-10-CM

## 2023-09-28 DIAGNOSIS — F32.1 CURRENT MODERATE EPISODE OF MAJOR DEPRESSIVE DISORDER WITHOUT PRIOR EPISODE (H): ICD-10-CM

## 2023-09-28 PROCEDURE — 99214 OFFICE O/P EST MOD 30 MIN: CPT | Mod: 95 | Performed by: STUDENT IN AN ORGANIZED HEALTH CARE EDUCATION/TRAINING PROGRAM

## 2023-09-28 PROCEDURE — 96127 BRIEF EMOTIONAL/BEHAV ASSMT: CPT | Performed by: STUDENT IN AN ORGANIZED HEALTH CARE EDUCATION/TRAINING PROGRAM

## 2023-09-28 RX ORDER — ESCITALOPRAM OXALATE 10 MG/1
10 TABLET ORAL DAILY
Qty: 30 TABLET | Refills: 1 | Status: CANCELLED | OUTPATIENT
Start: 2023-09-28

## 2023-09-28 RX ORDER — LORAZEPAM 0.5 MG/1
0.5 TABLET ORAL DAILY PRN
Qty: 12 TABLET | Refills: 0 | Status: SHIPPED | OUTPATIENT
Start: 2023-09-28 | End: 2024-04-16

## 2023-09-28 RX ORDER — HYDROXYZINE HYDROCHLORIDE 25 MG/1
25 TABLET, FILM COATED ORAL 3 TIMES DAILY PRN
Qty: 90 TABLET | Refills: 0 | Status: SHIPPED | OUTPATIENT
Start: 2023-09-28 | End: 2024-04-16
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
## 2023-10-03 ENCOUNTER — VIRTUAL VISIT (OUTPATIENT)
Dept: PSYCHOLOGY | Facility: CLINIC | Age: 43
End: 2023-10-03
Payer: OTHER GOVERNMENT

## 2023-10-03 DIAGNOSIS — F41.1 GENERALIZED ANXIETY DISORDER: ICD-10-CM

## 2023-10-03 DIAGNOSIS — F33.1 MAJOR DEPRESSIVE DISORDER, RECURRENT EPISODE, MODERATE (H): Primary | ICD-10-CM

## 2023-10-03 PROCEDURE — 90834 PSYTX W PT 45 MINUTES: CPT | Mod: VID

## 2023-10-03 ASSESSMENT — ANXIETY QUESTIONNAIRES
IF YOU CHECKED OFF ANY PROBLEMS ON THIS QUESTIONNAIRE, HOW DIFFICULT HAVE THESE PROBLEMS MADE IT FOR YOU TO DO YOUR WORK, TAKE CARE OF THINGS AT HOME, OR GET ALONG WITH OTHER PEOPLE: SOMEWHAT DIFFICULT
1. FEELING NERVOUS, ANXIOUS, OR ON EDGE: SEVERAL DAYS
2. NOT BEING ABLE TO STOP OR CONTROL WORRYING: SEVERAL DAYS
5. BEING SO RESTLESS THAT IT IS HARD TO SIT STILL: NOT AT ALL
GAD7 TOTAL SCORE: 6
GAD7 TOTAL SCORE: 6
3. WORRYING TOO MUCH ABOUT DIFFERENT THINGS: SEVERAL DAYS
7. FEELING AFRAID AS IF SOMETHING AWFUL MIGHT HAPPEN: SEVERAL DAYS
6. BECOMING EASILY ANNOYED OR IRRITABLE: SEVERAL DAYS
4. TROUBLE RELAXING: SEVERAL DAYS

## 2023-10-03 ASSESSMENT — PATIENT HEALTH QUESTIONNAIRE - PHQ9
SUM OF ALL RESPONSES TO PHQ QUESTIONS 1-9: 6
SUM OF ALL RESPONSES TO PHQ QUESTIONS 1-9: 6
10. IF YOU CHECKED OFF ANY PROBLEMS, HOW DIFFICULT HAVE THESE PROBLEMS MADE IT FOR YOU TO DO YOUR WORK, TAKE CARE OF THINGS AT HOME, OR GET ALONG WITH OTHER PEOPLE: SOMEWHAT DIFFICULT

## 2023-10-03 NOTE — PROGRESS NOTES
[facility]                                     Progress Note    Patient Name: Viktoriya Lovelace  Date: 10/03/2023         Service Type: Individual      Session Start Time:  12:29 PM  Session End Time: 1:19 PM     Session Length: 50 Minutes     Session #: 7    Attendees: Client attended alone    Service Modality:  Video Visit:      Provider verified identity through the following two step process.  Patient provided:  Patient is known previously to provider    Telemedicine Visit: The patient's condition can be safely assessed and treated via synchronous audio and visual telemedicine encounter.      Reason for Telemedicine Visit: Patient has requested telehealth visit    Originating Site (Patient Location): Patient's home    Distant Site (Provider Location): [facility]    Consent:  The patient/guardian has verbally consented to: the potential risks and benefits of telemedicine (video visit) versus in person care; bill my insurance or make self-payment for services provided; and responsibility for payment of non-covered services.     Patient would like the video invitation sent by:  Text to cell phone: [phone]    Mode of Communication:  Video Conference via Amwell    Distant Location (Provider):  On-site    As the provider I attest to compliance with applicable laws and regulations related to telemedicine.            DATA  Interactive Complexity: No  Crisis: No        Progress Since Last Session (Related to Symptoms / Goals / Homework):   Symptoms: Improving The patient reported a decrease in both anxious and depressive symptoms in between sessions.    Homework:  Continued      Episode of Care Goals: Minimal progress - CONTEMPLATION (Considering change and yet undecided); Intervened by assessing the negative and positive thinking (ambivalence) about behavior change     Current / Ongoing Stressors and Concerns:   The patient continued to  "report internal family stressors that have significantly impacted her way of being. The anti-depressant that she is taking continues to make her feel \"irritable and agitated\". The patient also reported that she has struggled with a lack of motivation, interest, and energy to do things that bring her kayleigh. Her son is having surgery at the end of September, which she indicated is stressful as well. Recently, the patient's  indicated that he is coming back to [place] and moving back into the house that the patient is staying in by the beginning of October/end of September. The patient noted this to be distressing, as she is now working very hard to find a new place to stay.        Treatment Objective(s) Addressed in This Session:   Decrease thoughts that you'd be better off dead or of suicide / self-harm  compile a list of boundaries that they would like to set with others. The patient will identify at least three boundaries that she would like to establish within her social relationships.     Intervention:   Motivational Interviewing: The therapist elicited responses from the patient regarding her current level of functioning. The patient gave a report of her overall functioning, as well as life stressors that she is currently processing. The patient indicated that her son had just undergone surgery and her partner had recently moved back into her home. She also had experienced an increase in overall mood after \"ceasing\" to take psychiatric medications. The writer provided both empathy and understanding as the patient processed ongoing life updates and stressors. The patient reported that she has been actively boundary setting with family members and friends. The writer affirmed the patient's progress and change talk towards her goals and reflected the patient's findings back to them. The writer will continue to support the patient throughout the following sessions to both manage stressors and continue to " decrease anxious and depressive symptoms.     Motivational Interviewing  Target Behavior:  Decreasing internal family stressors and establish various family boundaries    Stage of Change: ACTION (Actively working towards change)    MI Intervention: Expressed Empathy/Understanding, Supported Autonomy, Collaboration, Evocation, Permission to raise concern or advise, Open-ended questions, Reflections: simple and complex, Rolled with resistance: Emphasized patient autonomy, Complex reflection, and Evoked patient agenda, Change talk (evoked), and Reframe     Change Talk Expressed by the Patient: Desire to change Ability to change Reasons to change Need to change Committment to change Activation Taking steps    Provider Response to Change Talk: E - Evoked more info from patient about behavior change, A - Affirmed patient's thoughts, decisions, or attempts at behavior change, R - Reflected patient's change talk, and S - Summarized patient's change talk statements      Assessments completed prior to visit:  The following assessments were completed by patient for this visit:  PHQ9:       7/11/2023    11:33 AM 7/18/2023    12:22 PM 8/9/2023     9:54 AM 8/21/2023    10:57 AM 8/28/2023    10:53 AM 9/19/2023     1:27 PM 10/3/2023    12:23 PM   PHQ-9 SCORE   PHQ-9 Total Score MyChart 11 (Moderate depression) 12 (Moderate depression) 14 (Moderate depression) 10 (Moderate depression) 11 (Moderate depression) 11 (Moderate depression) 6 (Mild depression)   PHQ-9 Total Score 11 12 14 10 11 11 6     GAD7:       7/11/2023    11:33 AM 7/18/2023    12:23 PM 8/9/2023     9:55 AM 8/28/2023    10:54 AM 9/18/2023    10:58 AM 9/19/2023     1:27 PM 10/3/2023    12:23 PM   JM-7 SCORE   Total Score 12 (moderate anxiety) 15 (severe anxiety) 15 (severe anxiety) 16 (severe anxiety) 12 (moderate anxiety) 16 (severe anxiety) 6 (mild anxiety)   Total Score 12 15 15 16 12 16 6         ASSESSMENT: Current Emotional / Mental Status (status of significant  "symptoms):   Risk status (Self / Other harm or suicidal ideation)   Patient denies current fears or concerns for personal safety.   Patient reports the following current or recent suicidal ideation or behaviors: Passive suicidal ideation that is \"on and off\". \"It's the same thoughts as usual, I just want all the crap in life to go away\". A safety plan has been agreed to with the writer and the patient contracted for safety in session, while also displaying future forward thinking.   Patient denies current or recent homicidal ideation or behaviors.   Patient denies current or recent self injurious behavior or ideation.   Patient denies other safety concerns.   Patient reports there has been no change in risk factors since their last session.     Patient reports there has been no change in protective factors since their last session.     A safety and risk management plan has been developed including: When the patient identifies the following:          [facility]                                        Viktoriya Lovelace             SAFETY PLAN:  Step 1: Warning signs / cues (Thoughts, images, mood, situation, behavior) that a crisis may be developing:  Thoughts: \"I am just so overwhelmed\".   Images: None at this time.   Thinking Processes: ruminations (can't stop thinking about my problems): feeling stuck on each emotion and racing thoughts  Mood: intense worry  Behaviors: isolating/withdrawing  and crying   Situations: relationship problems   Step 2: Coping strategies - Things I can do to take my mind off of my problems without contacting another person (relaxation technique, physical activity):  Distress Tolerance Strategies:  Utilizing grounding exercises and sensory experiences  Physical Activities: Coloring   Focus on helpful thoughts:  think about happy memories: the kids and pets   Step 3: People and social settings that provide distraction:              Name: Work    Phone: N/A               " Name: Talha            Phone: The patient has Talha's phone number           Step 4: Remind myself of people and things that are important to me and worth living for:  The patients kids and pets.         Step 5: When I am in crisis, I can ask these people to help me use my safety plan:              Name: Talha            Phone: The patient has Talha's phone number              Name: Amy    Phone: The patient has Amy's phone number                 Step 6: Making the environment safe:   remove drugs  Step 7: Professionals or agencies I can contact during a crisis:  Suicide Prevention Lifeline: Call or Text 988   Local Crisis Services: 911     Call 911 or go to my nearest emergency department.      I helped develop this safety plan and agree to use it when needed.  I have been given a copy of this plan.       Client signature _________________________________________________________________  Today s date:  7/7/2023  Completed by Provider Name/ Credentials:  STEPHAN Lebron LGSW                       July 7, 2023  Adapted from Safety Plan Template 2008 Kitty Kaye and Ubaldo Pineda is reprinted with the express permission of the authors.  No portion of the Safety Plan Template may be reproduced without the express, written permission.  You can contact the authors at [email or web address] or [email or web address].          Appearance:   Appropriate    Eye Contact:   Good    Psychomotor Behavior: Normal    Attitude:   Cooperative  Interested Friendly   Orientation:   All   Speech    Rate / Production: Emotional    Volume:  Normal    Mood:    Anxious  Depressed    Affect:    Tearful   Thought Content:  Clear    Thought Form:  Coherent  Logical    Insight:    Good      Medication Review:   No changes to current psychiatric medication(s)     Medication Compliance:   Yes     Changes in Health Issues:   None reported     Chemical Use Review:   Substance Use: Chemical use reviewed, no active concerns  identified      Tobacco Use: No current tobacco use.      Diagnosis:  1. Major depressive disorder, recurrent episode, moderate (H)    2. Generalized anxiety disorder                Collateral Reports Completed:   Routed note to PCP    PLAN: (Patient Tasks / Therapist Tasks / Other)  The patient is to continue establishing boundaries within her internal family unit and managing ongoing stressors. The writer will continue to support the patient throughout the following sessions to both manage stressors and continue to decrease anxious and depressive symptoms.        López Ernst, STEPHAN LGSW           October 3rd, 2023                                                         ______________________________________________________________________    Individual Treatment Plan    Patient's Name: Viktoriya Lovelace  YOB: 1980    Date of Creation: 08/09/2023  Date Treatment Plan Last Reviewed/Revised: 10/03/2023    DSM5 Diagnoses: 296.32 (F33.1) Major Depressive Disorder, Recurrent Episode, Moderate _ or 300.02 (F41.1) Generalized Anxiety Disorder  Psychosocial / Contextual Factors: The patient reported concurrent ongoing family stressors that have significantly impacted the patients way of being. The patient is also in need of new housing in the next six months due to similar internal family stressors. The patient is experiencing maladaptive symptoms due to mental health medications and is consulting her primary care provider to address her concerns. Overall, the patient is experiencing significant symptoms of anxiety and depression including,  feeling overwhelmed with feelings of sadness and anxiety, difficulty sleeping and excessive sleep, frequent crying, avoiding social situations, and isolating. The patient also reported having little interest or pleasure in doing things, feeling down, feeling tired, a low sense of self-esteem, and difficulty concentrating. In terms of anxious symptoms, the patient  reported feelings of being nervious, difficulty controlling worry, worrying too much about different things, trouble relaxing, and increased irritability.   PROMIS (reviewed every 90 days): Reviewed with the patient on 07/07/2023    Referral / Collaboration:  Referral to another professional/service is not indicated at this time..    Anticipated number of session for this episode of care: 9-12 sessions  Anticipation frequency of session: Biweekly  Anticipated Duration of each session: 38-52 minutes  Treatment plan will be reviewed in 90 days or when goals have been changed.       MeasurableTreatment Goal(s) related to diagnosis / functional impairment(s)  Goal 1: SI - Patient will - report absence of persistent SI and report of reduced frequency and intensity of SI and absence of SI to acceptable levels, report subjective improved mood for a period of 90 days, within 6 months as clinically observed and by patient self-report    I will know I've met my goal when I can get rid of the SI and control it easier.      Objective #A (Patient Action)    Patient will Decrease thoughts that you'd be better off dead or of suicide / self-harm.  Status: Continued - Date(s): Continous    Intervention(s)  Therapist will  create a safety plan with the patient, educate the patient on available safety resources, and assist the patient in identifying triggers for SI .    Goal 2 Coping Skills: Client will report ability to utilize coping skills to acceptable levels, report subjective improved self-care for a period of 90 days, within 6 months as clinically observed and by patient self-report     I will know I've met my goal when I have my life back and feel like I can be happy again with all of the stress under control.      Objective #A (Client Action)    Client will compile a list of boundaries that they would like to set with others. The patient will identify at least three boundaries that she would like to establish within her  social relationships .  Status: Continued - Date(s): 10/03/2023 - 10/16/2023    Intervention(s)  Therapist will teach about healthy boundaries. The writer will discuss healthy boundaries with the patient and the importance of boundary setting .    Objective #B  Client will identify three strategies to more effectively address stressors.    Status: Continued - Date(s): 10/03/2023 - 10/16/2023    Intervention(s)  Therapist will explore coping skills with the patient. The therapist and patient will work together to identify a minimum of three coping skills the patient can utilize to effectively reduce stressors.          Patient has reviewed and agreed to the above plan.      STEPHAN Alba [facility]  October 3rd, 2023

## 2023-10-16 ENCOUNTER — VIRTUAL VISIT (OUTPATIENT)
Dept: PSYCHOLOGY | Facility: CLINIC | Age: 43
End: 2023-10-16

## 2023-10-16 DIAGNOSIS — F41.1 GENERALIZED ANXIETY DISORDER: ICD-10-CM

## 2023-10-16 DIAGNOSIS — F33.0 MILD EPISODE OF RECURRENT MAJOR DEPRESSIVE DISORDER (H): Primary | ICD-10-CM

## 2023-10-16 PROCEDURE — 90837 PSYTX W PT 60 MINUTES: CPT | Mod: VID

## 2023-10-16 ASSESSMENT — PATIENT HEALTH QUESTIONNAIRE - PHQ9
10. IF YOU CHECKED OFF ANY PROBLEMS, HOW DIFFICULT HAVE THESE PROBLEMS MADE IT FOR YOU TO DO YOUR WORK, TAKE CARE OF THINGS AT HOME, OR GET ALONG WITH OTHER PEOPLE: SOMEWHAT DIFFICULT
SUM OF ALL RESPONSES TO PHQ QUESTIONS 1-9: 4
SUM OF ALL RESPONSES TO PHQ QUESTIONS 1-9: 4

## 2023-10-16 NOTE — PROGRESS NOTES
[facility]                                     Progress Note    Patient Name: Viktoriya Lovelace  Date: 10/16/2023         Service Type: Individual      Session Start Time:  12:29 PM  Session End Time: 1:24 PM     Session Length: 55 Minutes     Session #: 8    Attendees: Client attended alone    Service Modality:  Video Visit:      Provider verified identity through the following two step process.  Patient provided:  Patient is known previously to provider    Telemedicine Visit: The patient's condition can be safely assessed and treated via synchronous audio and visual telemedicine encounter.      Reason for Telemedicine Visit: Patient has requested telehealth visit    Originating Site (Patient Location): Patient's home    Distant Site (Provider Location): [facility]    Consent:  The patient/guardian has verbally consented to: the potential risks and benefits of telemedicine (video visit) versus in person care; bill my insurance or make self-payment for services provided; and responsibility for payment of non-covered services.     Patient would like the video invitation sent by:  Text to cell phone: [phone]    Mode of Communication:  Video Conference via [employer]    Distant Location (Provider):  On-site    As the provider I attest to compliance with applicable laws and regulations related to telemedicine.    Extended Session (53+ minutes): PROLONGED SERVICE IN THE OUTPATIENT SETTING REQUIRING DIRECT (FACE-TO-FACE) PATIENT CONTACT BEYOND THE USUAL SERVICE:          DATA  Interactive Complexity: No  Crisis: No        Progress Since Last Session (Related to Symptoms / Goals / Homework):   Symptoms: Improving The patient noted a decrease in depressive symptoms in between sessions.     Homework:  Continued      Episode of Care Goals: Minimal progress - CONTEMPLATION (Considering change and yet undecided); Intervened by assessing the  "negative and positive thinking (ambivalence) about behavior change     Current / Ongoing Stressors and Concerns:   The patient continued to report internal family stressors that have significantly impacted her way of being. She is no longer taking the anti-depressant that she noted to make her feel \"irritable\" and \"not myself\".  Recently, the patient's  moved back into the house, which the patient noted to be distressing. She is attempting to work on boundaries with her , while also continuing to take care of her son who is still recovering from surgery.      Treatment Objective(s) Addressed in This Session:     compile a list of boundaries that they would like to set with others. The patient will identify at least three boundaries that she would like to establish within her social relationships.     Intervention:   Motivational Interviewing: The therapist elicited responses from the patient regarding her current level of functioning. The patient gave a report of her overall functioning, as well as life stressors that she is currently processing. The patient indicated that her  has moved back into the house and that \"his stuff is all over the house. He's moved in like he's never left\". She also noted that she is trying to re-establish boundaries with her . \"I am trying to get him to  after himself and respect my personal space\".  The writer provided both empathy and understanding as the patient processed ongoing life updates and stressors. The patient reported that she has been attempting to actively boundary set at home and \"destress\" when she can by colorig. The writer affirmed the patient's progress and change talk towards her goals and reflected the patient's findings back to her. The writer will continue to support the patient throughout the following sessions to both manage stressors and continue to decrease anxious and depressive symptoms.     Motivational " Interviewing  Target Behavior:  Establishing boundaries with her .    Stage of Change: ACTION (Actively working towards change)    MI Intervention: Expressed Empathy/Understanding, Supported Autonomy, Collaboration, Evocation, Permission to raise concern or advise, Open-ended questions, Reflections: simple and complex, Rolled with resistance: Emphasized patient autonomy, Complex reflection, and Evoked patient agenda, Change talk (evoked), and Reframe     Change Talk Expressed by the Patient: Desire to change Ability to change Reasons to change Need to change Committment to change Activation Taking steps    Provider Response to Change Talk: E - Evoked more info from patient about behavior change, A - Affirmed patient's thoughts, decisions, or attempts at behavior change, R - Reflected patient's change talk, and S - Summarized patient's change talk statements      Assessments completed prior to visit:  The following assessments were completed by patient for this visit:  PHQ9:       7/18/2023    12:22 PM 8/9/2023     9:54 AM 8/21/2023    10:57 AM 8/28/2023    10:53 AM 9/19/2023     1:27 PM 10/3/2023    12:23 PM 10/16/2023    12:29 PM   PHQ-9 SCORE   PHQ-9 Total Score MyChart 12 (Moderate depression) 14 (Moderate depression) 10 (Moderate depression) 11 (Moderate depression) 11 (Moderate depression) 6 (Mild depression) 4 (Minimal depression)   PHQ-9 Total Score 12 14 10 11 11 6 4     GAD7:       7/11/2023    11:33 AM 7/18/2023    12:23 PM 8/9/2023     9:55 AM 8/28/2023    10:54 AM 9/18/2023    10:58 AM 9/19/2023     1:27 PM 10/3/2023    12:23 PM   JM-7 SCORE   Total Score 12 (moderate anxiety) 15 (severe anxiety) 15 (severe anxiety) 16 (severe anxiety) 12 (moderate anxiety) 16 (severe anxiety) 6 (mild anxiety)   Total Score 12 15 15 16 12 16 6         ASSESSMENT: Current Emotional / Mental Status (status of significant symptoms):   Risk status (Self / Other harm or suicidal ideation)   Patient denies current  "fears or concerns for personal safety.   Patient reports the following current or recent suicidal ideation or behaviors: Passive suicidal ideation that is \"on and off\". \"It's the same thoughts as usual, I just want all the crap in life to go away\". A safety plan has been agreed to with the writer and the patient contracted for safety in session, while also displaying future forward thinking.   Patient denies current or recent homicidal ideation or behaviors.   Patient denies current or recent self injurious behavior or ideation.   Patient denies other safety concerns.   Patient reports there has been no change in risk factors since their last session.     Patient reports there has been no change in protective factors since their last session.     A safety and risk management plan has been developed including: When the patient identifies the following:          [facility]                                        Viktoriya Lovelace             SAFETY PLAN:  Step 1: Warning signs / cues (Thoughts, images, mood, situation, behavior) that a crisis may be developing:  Thoughts: \"I am just so overwhelmed\".   Images: None at this time.   Thinking Processes: ruminations (can't stop thinking about my problems): feeling stuck on each emotion and racing thoughts  Mood: intense worry  Behaviors: isolating/withdrawing  and crying   Situations: relationship problems   Step 2: Coping strategies - Things I can do to take my mind off of my problems without contacting another person (relaxation technique, physical activity):  Distress Tolerance Strategies:  Utilizing grounding exercises and sensory experiences  Physical Activities: Coloring   Focus on helpful thoughts:  think about happy memories: the kids and pets   Step 3: People and social settings that provide distraction:              Name: Work    Phone: N/A               Name: Talha            Phone: The patient has Talha's phone number           Step 4: Remind " myself of people and things that are important to me and worth living for:  The patients kids and pets.         Step 5: When I am in crisis, I can ask these people to help me use my safety plan:              Name: Talha            Phone: The patient has Talha's phone number              Name: Amy    Phone: The patient has Amy's phone number                 Step 6: Making the environment safe:   remove drugs  Step 7: Professionals or agencies I can contact during a crisis:  Suicide Prevention Lifeline: Call or Text 988   Local Crisis Services: 911     Call 911 or go to my nearest emergency department.      I helped develop this safety plan and agree to use it when needed.  I have been given a copy of this plan.       Client signature _________________________________________________________________  Today s date:  7/7/2023  Completed by Provider Name/ Credentials:  STEPHAN Lebron LGSW                       July 7, 2023  Adapted from Safety Plan Template 2008 Kitty Kaye and Ubaldo Pineda is reprinted with the express permission of the authors.  No portion of the Safety Plan Template may be reproduced without the express, written permission.  You can contact the authors at [email or web address] or [email or web address].          Appearance:   Appropriate    Eye Contact:   Good    Psychomotor Behavior: Normal    Attitude:   Cooperative  Interested Friendly   Orientation:   All   Speech    Rate / Production: Emotional    Volume:  Normal    Mood:    Anxious  Depressed    Affect:    Tearful   Thought Content:  Clear    Thought Form:  Coherent  Logical    Insight:    Good      Medication Review:   No changes to current psychiatric medication(s)     Medication Compliance:   Yes     Changes in Health Issues:   None reported     Chemical Use Review:   Substance Use: Chemical use reviewed, no active concerns identified      Tobacco Use: No current tobacco use.      Diagnosis:  1. Mild episode of recurrent  major depressive disorder (H24)    2. Generalized anxiety disorder                  Collateral Reports Completed:   Routed note to PCP    PLAN: (Patient Tasks / Therapist Tasks / Other)  The patient is to continue establishing boundaries within her internal family unit and managing ongoing stressors. The writer will continue to support the patient throughout the following sessions to both manage stressors and continue to decrease anxious and depressive symptoms.        López Ernst, MSW LGSW           October 16th, 2023                                                         ______________________________________________________________________    Individual Treatment Plan    Patient's Name: Viktoriya Lovelace  YOB: 1980    Date of Creation: 08/09/2023  Date Treatment Plan Last Reviewed/Revised: 10/16/2023    DSM5 Diagnoses: 296.32 (F33.1) Major Depressive Disorder, Recurrent Episode, Moderate _ or 300.02 (F41.1) Generalized Anxiety Disorder  Psychosocial / Contextual Factors: The patient reported concurrent ongoing family stressors that have significantly impacted the patients way of being. The patient is also in need of new housing in the next six months due to similar internal family stressors. The patient is experiencing maladaptive symptoms due to mental health medications and is consulting her primary care provider to address her concerns. Overall, the patient is experiencing significant symptoms of anxiety and depression including,  feeling overwhelmed with feelings of sadness and anxiety, difficulty sleeping and excessive sleep, frequent crying, avoiding social situations, and isolating. The patient also reported having little interest or pleasure in doing things, feeling down, feeling tired, a low sense of self-esteem, and difficulty concentrating. In terms of anxious symptoms, the patient reported feelings of being nervious, difficulty controlling worry, worrying too much about different  things, trouble relaxing, and increased irritability.   PROMIS (reviewed every 90 days): Reviewed with the patient on 07/07/2023    Referral / Collaboration:  Referral to another professional/service is not indicated at this time..    Anticipated number of session for this episode of care: 9-12 sessions  Anticipation frequency of session: Biweekly  Anticipated Duration of each session: 38-52 minutes  Treatment plan will be reviewed in 90 days or when goals have been changed.       MeasurableTreatment Goal(s) related to diagnosis / functional impairment(s)  Goal 1: SI - Patient will - report absence of persistent SI and report of reduced frequency and intensity of SI and absence of SI to acceptable levels, report subjective improved mood for a period of 90 days, within 6 months as clinically observed and by patient self-report    I will know I've met my goal when I can get rid of the SI and control it easier.      Objective #A (Patient Action)    Patient will Decrease thoughts that you'd be better off dead or of suicide / self-harm.  Status: Continued - Date(s): Continous    Intervention(s)  Therapist will  create a safety plan with the patient, educate the patient on available safety resources, and assist the patient in identifying triggers for SI .    Goal 2 Coping Skills: Client will report ability to utilize coping skills to acceptable levels, report subjective improved self-care for a period of 90 days, within 6 months as clinically observed and by patient self-report     I will know I've met my goal when I have my life back and feel like I can be happy again with all of the stress under control.      Objective #A (Client Action)    Client will compile a list of boundaries that they would like to set with others. The patient will identify at least three boundaries that she would like to establish within her social relationships .  Status: Continued - Date(s): 10/16/2023 - 10/30/2023    Intervention(s)  Therapist  will teach about healthy boundaries. The writer will discuss healthy boundaries with the patient and the importance of boundary setting .    Objective #B  Client will identify three strategies to more effectively address stressors.    Status: Continued - Date(s): 10/16/2023 -10/30/2023    Intervention(s)  Therapist will explore coping skills with the patient. The therapist and patient will work together to identify a minimum of three coping skills the patient can utilize to effectively reduce stressors.          Patient has reviewed and agreed to the above plan.      STEPHAN Alba THOMAS  October 16th, 2023

## 2023-10-19 ENCOUNTER — PATIENT OUTREACH (OUTPATIENT)
Dept: FAMILY MEDICINE | Facility: CLINIC | Age: 43
End: 2023-10-19
Payer: OTHER GOVERNMENT

## 2023-10-19 NOTE — TELEPHONE ENCOUNTER
Please abstract the following data from this visit with this patient into the appropriate field in Epic:    Tests that can be patient reported without a hard copy:    Pap smear done on this date: 12/6/21 (approximately), by this group: [facility],     Other Tests found in the patient's chart through Chart Review/Care Everywhere:    Pap smear done by this group [facility] on this date: 12/6/21    Note to Abstraction: If this section is blank, no results were found via Chart Review/Care Everywhere.    LILA TuckerN, RN  [facility]

## 2023-11-13 ENCOUNTER — VIRTUAL VISIT (OUTPATIENT)
Dept: PSYCHOLOGY | Facility: CLINIC | Age: 43
End: 2023-11-13
Payer: OTHER GOVERNMENT

## 2023-11-13 DIAGNOSIS — F33.0 MILD EPISODE OF RECURRENT MAJOR DEPRESSIVE DISORDER (H): Primary | ICD-10-CM

## 2023-11-13 DIAGNOSIS — F41.1 GENERALIZED ANXIETY DISORDER: ICD-10-CM

## 2023-11-13 PROCEDURE — 90837 PSYTX W PT 60 MINUTES: CPT | Mod: VID

## 2023-11-13 ASSESSMENT — ANXIETY QUESTIONNAIRES
GAD7 TOTAL SCORE: 5
6. BECOMING EASILY ANNOYED OR IRRITABLE: SEVERAL DAYS
7. FEELING AFRAID AS IF SOMETHING AWFUL MIGHT HAPPEN: SEVERAL DAYS
1. FEELING NERVOUS, ANXIOUS, OR ON EDGE: SEVERAL DAYS
GAD7 TOTAL SCORE: 5
4. TROUBLE RELAXING: SEVERAL DAYS
2. NOT BEING ABLE TO STOP OR CONTROL WORRYING: SEVERAL DAYS
3. WORRYING TOO MUCH ABOUT DIFFERENT THINGS: NOT AT ALL
5. BEING SO RESTLESS THAT IT IS HARD TO SIT STILL: NOT AT ALL

## 2023-11-13 NOTE — PROGRESS NOTES
[facility]                                     Progress Note    Patient Name: Viktoriya Lovelace  Date: 11/13/2023         Service Type: Individual      Session Start Time: 9:59 AM  Session End Time: 10:58 AM     Session Length: 59 Minutes     Session #: 9    Attendees: Client attended alone    Service Modality:  Video Visit:      Provider verified identity through the following two step process.  Patient provided:  Patient is known previously to provider    Telemedicine Visit: The patient's condition can be safely assessed and treated via synchronous audio and visual telemedicine encounter.      Reason for Telemedicine Visit: Patient has requested telehealth visit    Originating Site (Patient Location): Patient's home    Distant Site (Provider Location): [facility]    Consent:  The patient/guardian has verbally consented to: the potential risks and benefits of telemedicine (video visit) versus in person care; bill my insurance or make self-payment for services provided; and responsibility for payment of non-covered services.     Patient would like the video invitation sent by:  Text to cell phone: [phone]    Mode of Communication:  Video Conference via [employer]    Distant Location (Provider):  On-site    As the provider I attest to compliance with applicable laws and regulations related to telemedicine.      Extended Session (53+ minutes): PROLONGED SERVICE IN THE OUTPATIENT SETTING REQUIRING DIRECT (FACE-TO-FACE) PATIENT CONTACT BEYOND THE USUAL SERVICE:         DATA  Interactive Complexity: No  Crisis: No        Progress Since Last Session (Related to Symptoms / Goals / Homework):   Symptoms: Improving The patient continued to report a decrease in both anxious and depressive symptoms.    Homework:  Continued      Episode of Care Goals: Satisfactory progress - ACTION (Actively working towards change); Intervened by reinforcing change  "plan / affirming steps taken     Current / Ongoing Stressors and Concerns:   The patient continued to report internal family stressors that have significantly impacted her way of being. She is no longer taking the anti-depressant that she noted to make her feel \"irritable\" and \"not myself\".  Recently, the patient's  moved back into the house, which the patient noted to be distressing. She is attempting to work on boundaries with her . Her son had pneumonia the in the past few weeks, which required him to be cared for at the hospital. The patient indicated that this event was distressing and \"delayed\" her sons recovery.      Treatment Objective(s) Addressed in This Session:     compile a list of boundaries that they would like to set with others. The patient will identify at least three boundaries that she would like to establish within her social relationships.     Intervention:   Motivational Interviewing: The therapist elicited responses from the patient regarding her current level of functioning. The patient gave a report of her overall functioning, as well as life stressors that she is currently processing. The patient indicated that her son was recently back in the hospital due to pneumonia and that he is now back at home \"recovering\". She is continuing to try to establish boundaries with her  who has moved back into the house.  The writer provided both empathy and understanding as the patient processed ongoing life updates and stressors. The patient reported that she has been attempting to actively \"destress\" when she can by coloring, listening to pod casts, and \"enjoying time to myself\". The writer affirmed the patient's progress and change talk towards her goals and reflected the patient's findings back to her. The writer will continue to support the patient throughout the following sessions to both manage stressors and continue to decrease anxious and depressive symptoms. "     Motivational Interviewing  Target Behavior:  Establishing boundaries with her .    Stage of Change: ACTION (Actively working towards change)    MI Intervention: Expressed Empathy/Understanding, Supported Autonomy, Collaboration, Evocation, Permission to raise concern or advise, Open-ended questions, Reflections: simple and complex, Rolled with resistance: Emphasized patient autonomy, Complex reflection, and Evoked patient agenda, Change talk (evoked), and Reframe     Change Talk Expressed by the Patient: Desire to change Ability to change Reasons to change Need to change Committment to change Activation Taking steps    Provider Response to Change Talk: E - Evoked more info from patient about behavior change, A - Affirmed patient's thoughts, decisions, or attempts at behavior change, R - Reflected patient's change talk, and S - Summarized patient's change talk statements      Assessments completed prior to visit:  The following assessments were completed by patient for this visit:  PHQ9:       8/9/2023     9:54 AM 8/21/2023    10:57 AM 8/28/2023    10:53 AM 9/19/2023     1:27 PM 10/3/2023    12:23 PM 10/16/2023    12:29 PM 11/13/2023    10:00 AM   PHQ-9 SCORE   PHQ-9 Total Score MyChart 14 (Moderate depression) 10 (Moderate depression) 11 (Moderate depression) 11 (Moderate depression) 6 (Mild depression) 4 (Minimal depression) 4 (Minimal depression)   PHQ-9 Total Score 14 10 11 11 6 4 4     GAD7:       7/18/2023    12:23 PM 8/9/2023     9:55 AM 8/28/2023    10:54 AM 9/18/2023    10:58 AM 9/19/2023     1:27 PM 10/3/2023    12:23 PM 11/13/2023    10:00 AM   JM-7 SCORE   Total Score 15 (severe anxiety) 15 (severe anxiety) 16 (severe anxiety) 12 (moderate anxiety) 16 (severe anxiety) 6 (mild anxiety) 5 (mild anxiety)   Total Score 15 15 16 12 16 6 5         ASSESSMENT: Current Emotional / Mental Status (status of significant symptoms):   Risk status (Self / Other harm or suicidal ideation)   Patient denies  "current fears or concerns for personal safety.   Patient reports the following current or recent suicidal ideation or behaviors: Passive suicidal ideation that is \"on and off\". \"It's the same thoughts as usual, I just want all the crap in life to go away\". A safety plan has been agreed to with the writer and the patient contracted for safety in session, while also displaying future forward thinking.   Patient denies current or recent homicidal ideation or behaviors.   Patient denies current or recent self injurious behavior or ideation.   Patient denies other safety concerns.   Patient reports there has been no change in risk factors since their last session.     Patient reports there has been no change in protective factors since their last session.     A safety and risk management plan has been developed including: When the patient identifies the following:          [facility]                                        Viktoriya Lovelace             SAFETY PLAN:  Step 1: Warning signs / cues (Thoughts, images, mood, situation, behavior) that a crisis may be developing:  Thoughts: \"I am just so overwhelmed\".   Images: None at this time.   Thinking Processes: ruminations (can't stop thinking about my problems): feeling stuck on each emotion and racing thoughts  Mood: intense worry  Behaviors: isolating/withdrawing  and crying   Situations: relationship problems   Step 2: Coping strategies - Things I can do to take my mind off of my problems without contacting another person (relaxation technique, physical activity):  Distress Tolerance Strategies:  Utilizing grounding exercises and sensory experiences  Physical Activities: Coloring   Focus on helpful thoughts:  think about happy memories: the kids and pets   Step 3: People and social settings that provide distraction:              Name: Work    Phone: N/A               Name: Talha            Phone: The patient has Talha's phone number           Step 4: " Remind myself of people and things that are important to me and worth living for:  The patients kids and pets.         Step 5: When I am in crisis, I can ask these people to help me use my safety plan:              Name: Talha            Phone: The patient has Talha's phone number              Name: Amy    Phone: The patient has Amy's phone number                 Step 6: Making the environment safe:   remove drugs  Step 7: Professionals or agencies I can contact during a crisis:  Suicide Prevention Lifeline: Call or Text 988   Local Crisis Services: 911     Call 911 or go to my nearest emergency department.      I helped develop this safety plan and agree to use it when needed.  I have been given a copy of this plan.       Client signature _________________________________________________________________  Today s date:  7/7/2023  Completed by Provider Name/ Credentials:  STEPHAN Lebron LGSW                       July 7, 2023  Adapted from Safety Plan Template 2008 Kitty Kaye and Ubaldo Pineda is reprinted with the express permission of the authors.  No portion of the Safety Plan Template may be reproduced without the express, written permission.  You can contact the authors at [email or web address] or [email or web address].          Appearance:   Appropriate    Eye Contact:   Good    Psychomotor Behavior: Normal    Attitude:   Cooperative  Interested Friendly   Orientation:   All   Speech    Rate / Production: Emotional    Volume:  Normal    Mood:    Anxious  Depressed    Affect:    Tearful   Thought Content:  Clear    Thought Form:  Coherent  Logical    Insight:    Good      Medication Review:   No changes to current psychiatric medication(s)     Medication Compliance:   Yes     Changes in Health Issues:   None reported     Chemical Use Review:   Substance Use: Chemical use reviewed, no active concerns identified      Tobacco Use: No current tobacco use.      Diagnosis:  1. Mild episode of  recurrent major depressive disorder (H24)    2. Generalized anxiety disorder                    Collateral Reports Completed:   Routed note to PCP    PLAN: (Patient Tasks / Therapist Tasks / Other)  The patient is to continue establishing boundaries within her internal family unit and managing ongoing stressors. The writer will finish services with the patient next session as the writer is departing from the agency at the end of the month.         López Ernst, MSW LGSW           November 13th, 2023                                                         ______________________________________________________________________    Individual Treatment Plan    Patient's Name: Viktoriya Lovelace  YOB: 1980    Date of Creation: 08/09/2023  Date Treatment Plan Last Reviewed/Revised: 11/13/2023    DSM5 Diagnoses: 296.32 (F33.1) Major Depressive Disorder, Recurrent Episode, Moderate _ or 300.02 (F41.1) Generalized Anxiety Disorder  Psychosocial / Contextual Factors: The patient reported concurrent ongoing family stressors that have significantly impacted the patients way of being. The patient is also in need of new housing in the next six months due to similar internal family stressors. The patient is experiencing maladaptive symptoms due to mental health medications and is consulting her primary care provider to address her concerns. Overall, the patient is experiencing significant symptoms of anxiety and depression including,  feeling overwhelmed with feelings of sadness and anxiety, difficulty sleeping and excessive sleep, frequent crying, avoiding social situations, and isolating. The patient also reported having little interest or pleasure in doing things, feeling down, feeling tired, a low sense of self-esteem, and difficulty concentrating. In terms of anxious symptoms, the patient reported feelings of being nervious, difficulty controlling worry, worrying too much about different things, trouble  relaxing, and increased irritability.   PROMIS (reviewed every 90 days): Reviewed with the patient on 11/13/2023    Referral / Collaboration:  Referral to another professional/service is not indicated at this time..    Anticipated number of session for this episode of care: 9-12 sessions  Anticipation frequency of session: Biweekly  Anticipated Duration of each session: 38-52 minutes  Treatment plan will be reviewed in 90 days or when goals have been changed.       MeasurableTreatment Goal(s) related to diagnosis / functional impairment(s)  Goal 1: SI - Patient will - report absence of persistent SI and report of reduced frequency and intensity of SI and absence of SI to acceptable levels, report subjective improved mood for a period of 90 days, within 6 months as clinically observed and by patient self-report    I will know I've met my goal when I can get rid of the SI and control it easier.      Objective #A (Patient Action)    Patient will Decrease thoughts that you'd be better off dead or of suicide / self-harm.  Status: Continued - Date(s): Continous    Intervention(s)  Therapist will  create a safety plan with the patient, educate the patient on available safety resources, and assist the patient in identifying triggers for SI .    Goal 2 Coping Skills: Client will report ability to utilize coping skills to acceptable levels, report subjective improved self-care for a period of 90 days, within 6 months as clinically observed and by patient self-report     I will know I've met my goal when I have my life back and feel like I can be happy again with all of the stress under control.      Objective #A (Client Action)    Client will compile a list of boundaries that they would like to set with others. The patient will identify at least three boundaries that she would like to establish within her social relationships .  Status: Continued - Date(s): 11/13/2023 - 11/27/2023    Intervention(s)  Therapist will teach  about healthy boundaries. The writer will discuss healthy boundaries with the patient and the importance of boundary setting .    Objective #B  Client will identify three strategies to more effectively address stressors.    Status: Continued - Date(s): 11/13/2023 - 11/27/2023    Intervention(s)  Therapist will explore coping skills with the patient. The therapist and patient will work together to identify a minimum of three coping skills the patient can utilize to effectively reduce stressors.          Patient has reviewed and agreed to the above plan.      STEPHAN Alba [facility]  November 13th, 2023

## 2023-11-27 ENCOUNTER — VIRTUAL VISIT (OUTPATIENT)
Dept: PSYCHOLOGY | Facility: CLINIC | Age: 43
End: 2023-11-27
Payer: OTHER GOVERNMENT

## 2023-11-27 ENCOUNTER — FCC EXTENDED DOCUMENTATION (OUTPATIENT)
Dept: PSYCHOLOGY | Facility: CLINIC | Age: 43
End: 2023-11-27
Payer: OTHER GOVERNMENT

## 2023-11-27 DIAGNOSIS — F41.1 GENERALIZED ANXIETY DISORDER: ICD-10-CM

## 2023-11-27 DIAGNOSIS — F33.0 MILD EPISODE OF RECURRENT MAJOR DEPRESSIVE DISORDER (H): Primary | ICD-10-CM

## 2023-11-27 PROCEDURE — 90834 PSYTX W PT 45 MINUTES: CPT | Mod: VID

## 2023-11-27 ASSESSMENT — ANXIETY QUESTIONNAIRES
GAD7 TOTAL SCORE: 6
GAD7 TOTAL SCORE: 6
6. BECOMING EASILY ANNOYED OR IRRITABLE: SEVERAL DAYS
3. WORRYING TOO MUCH ABOUT DIFFERENT THINGS: SEVERAL DAYS
1. FEELING NERVOUS, ANXIOUS, OR ON EDGE: SEVERAL DAYS
5. BEING SO RESTLESS THAT IT IS HARD TO SIT STILL: NOT AT ALL
4. TROUBLE RELAXING: SEVERAL DAYS
2. NOT BEING ABLE TO STOP OR CONTROL WORRYING: SEVERAL DAYS
7. FEELING AFRAID AS IF SOMETHING AWFUL MIGHT HAPPEN: SEVERAL DAYS

## 2023-11-27 NOTE — PROGRESS NOTES
Discharge Summary  Multiple Sessions    Client Name: Viktoriya Lovelace MRN#: [id number] YOB: 1980    Discharge Date:   November 27, 2023    Service Modality: Video Visit:      Provider verified identity through the following two step process.  Patient provided:  Patient is known previously to provider    Telemedicine Visit: The patient's condition can be safely assessed and treated via synchronous audio and visual telemedicine encounter.      Reason for Telemedicine Visit: Patient has requested telehealth visit    Originating Site (Patient Location): Patient's home    Distant Site (Provider Location): Provider Remote Setting- Home Office    Consent:  The patient/guardian has verbally consented to: the potential risks and benefits of telemedicine (video visit) versus in person care; bill my insurance or make self-payment for services provided; and responsibility for payment of non-covered services.     Patient would like the video invitation sent by:  Text to cell phone: [phone]    Mode of Communication:  Video Conference via [employer]    Distant Location (Provider):  Off-site    As the provider I attest to compliance with applicable laws and regulations related to telemedicine.    Service Type: Individual      Session Start Time: 10:02 AM  Session End Time: 10:46 AM      Session Length: 44 Minutes     Session #: 10     Attendees: Client attended alone      Focus of Treatment Objective(s):  Client's presenting concerns included:  Symptoms management for depression, anxiety, and internal family stressors.  Stage of Change at time of Discharge: ACTION (Actively working towards change)    Medication Adherence:  Yes    Chemical Use:  No    Assessment: Current Emotional / Mental Status (status of significant symptoms):    Risk status (Self / Other harm or suicidal ideation)  Client denies current fears or concerns for personal safety.  Client denies current or recent suicidal ideation or  "behaviors.  Client denies current or recent homicidal ideation or behaviors.  Client denies current or recent self injurious behavior or ideation.  Client denies other safety concerns.         [facility]                                        Viktoriya Lovelace              SAFETY PLAN:  Step 1: Warning signs / cues (Thoughts, images, mood, situation, behavior) that a crisis may be developing:  Thoughts: \"I am just so overwhelmed\".   Images: None at this time.   Thinking Processes: ruminations (can't stop thinking about my problems): feeling stuck on each emotion and racing thoughts  Mood: intense worry  Behaviors: isolating/withdrawing  and crying   Situations: relationship problems   Step 2: Coping strategies - Things I can do to take my mind off of my problems without contacting another person (relaxation technique, physical activity):  Distress Tolerance Strategies:  Utilizing grounding exercises and sensory experiences  Physical Activities: Coloring   Focus on helpful thoughts:  think about happy memories: the kids and pets   Step 3: People and social settings that provide distraction:              Name: Work    Phone: N/A               Name: Talha            Phone: The patient has [employer]'s phone number           Step 4: Remind myself of people and things that are important to me and worth living for:  The patients kids and pets.         Step 5: When I am in crisis, I can ask these people to help me use my safety plan:              Name: Talha            Phone: The patient has [employer]'s phone number              Name: Amy    Phone: The patient has [employer]'s phone number                 Step 6: Making the environment safe:   remove drugs  Step 7: Professionals or agencies I can contact during a crisis:  Suicide Prevention Lifeline: Call or Text 988   Local Crisis Services: 911     Call 911 or go to my nearest emergency department.       I helped develop this safety plan and agree to use it when " needed.  I have been given a copy of this plan.       Client signature _________________________________________________________________  Today s date:  7/7/2023  Completed by Provider Name/ Credentials:  STEPHAN Lebron LGSW                       July 7, 2023  Adapted from Safety Plan Template 2008 Kitty Kaye and Ubaldo Pineda is reprinted with the express permission of the authors.  No portion of the Safety Plan Template may be reproduced without the express, written permission.  You can contact the authors at [email or web address] or [email or web address]    Appearance:   Appropriate   Eye Contact:   Good   Psychomotor Behavior: Normal   Attitude:   Cooperative  Interested Friendly  Orientation:   All  Speech   Rate / Production: Normal    Volume:  Normal   Mood:    Normal  Affect:    Appropriate   Thought Content:  Clear   Thought Form:  Coherent  Logical   Insight:   Good     DSM5 Diagnoses: (Sustained by DSM5 Criteria Listed Above)  Diagnoses: 296.31 (F33.0) Major Depressive Disorder, Recurrent Episode, Mild _  300.02 (F41.1) Generalized Anxiety Disorder  Psychosocial & Contextual Factors: The patient reported concurrent ongoing family stressors that have significantly impacted the patients way of being. The patient is also in need of new housing in the next six months due to similar internal family stressors. The patient is experiencing maladaptive symptoms due to mental health medications and is consulting her primary care provider to address her concerns. Overall, the patient is experiencing significant symptoms of anxiety and depression including,  feeling overwhelmed with feelings of sadness and anxiety, difficulty sleeping and excessive sleep, frequent crying, avoiding social situations, and isolating. The patient also reported having little interest or pleasure in doing things, feeling down, feeling tired, a low sense of self-esteem, and difficulty concentrating. In terms of anxious  symptoms, the patient reported feelings of being nervious, difficulty controlling worry, worrying too much about different things, trouble relaxing, and increased irritability.        Reason for Discharge:  Client is satisfied with progress and the writer is departing the agency.      Aftercare Plan:  Client agreed to follow safety contract after discharge  Client may resume counseling services at any time in the future by calling the [facility] Intake Office, [phone].      STEPHAN Alba November 27, 2023

## 2023-11-27 NOTE — PROGRESS NOTES
[facility]                                     Progress Note    Patient Name: Viktoriya Lovelace  Date: 11/27/2023         Service Type: Individual      Session Start Time: 10:02 AM  Session End Time:  10:46 AM     Session Length:  44 Minutes     Session #: 10    Attendees: Client attended alone    Service Modality:  Video Visit:      Provider verified identity through the following two step process.  Patient provided:  Patient is known previously to provider    Telemedicine Visit: The patient's condition can be safely assessed and treated via synchronous audio and visual telemedicine encounter.      Reason for Telemedicine Visit: Patient has requested telehealth visit    Originating Site (Patient Location): Patient's home    Distant Site (Provider Location): [facility]    Consent:  The patient/guardian has verbally consented to: the potential risks and benefits of telemedicine (video visit) versus in person care; bill my insurance or make self-payment for services provided; and responsibility for payment of non-covered services.     Patient would like the video invitation sent by:  Text to cell phone: [phone]    Mode of Communication:  Video Conference via Amwell    Distant Location (Provider):  On-site    As the provider I attest to compliance with applicable laws and regulations related to telemedicine.               DATA  Interactive Complexity: No  Crisis: No        Progress Since Last Session (Related to Symptoms / Goals / Homework):   Symptoms: Improving The patient continued to report a decrease in both anxious and depressive symptoms.    Homework: Achieved / completed to satisfaction      Episode of Care Goals: Satisfactory progress - ACTION (Actively working towards change); Intervened by reinforcing change plan / affirming steps taken     Current / Ongoing Stressors and Concerns:   The patient continued to report internal  "family stressors that have significantly impacted her way of being. She is no longer taking the anti-depressant that she noted to make her feel \"irritable\" and \"not myself\".  Recently, the patient's  moved back into the house, which the patient noted to be distressing. She is attempting to work on boundaries with her . Her son had pneumonia the in the past few weeks, which required him to be cared for at the hospital. Her son is now back home and doing \"well\".The patient indicated that this event was distressing and \"delayed\" her sons recovery.      Treatment Objective(s) Addressed in This Session:     compile a list of boundaries that they would like to set with others. The patient will identify at least three boundaries that she would like to establish within her social relationships.     Intervention:   Motivational Interviewing: The therapist elicited responses from the patient regarding her current level of functioning. The patient gave a report of her overall functioning, as well as life stressors that she is currently processing. The patient indicated that her son is back home and \"recovered\" from the hospital. She is continuing to try to establish boundaries with her  who has moved back into the house.  The writer provided both empathy and understanding as the patient processed ongoing life updates and stressors. The patient reported that she has been attempting to actively \"destress\" when she can by coloring, listening to pod casts, and \"enjoying time to myself\". The patient also reported future forward goals such as acquiring housing so that she can reduce stressors with her . The writer affirmed the patient's progress and change talk towards her goals and reflected the patient's findings back to her. The writer encouraged the patient to continue utilizing her coping skills, while also engaging in future goals as the writers time with the patient has come to an end. "     Motivational Interviewing  Target Behavior:  Establishing boundaries with her .    Stage of Change: ACTION (Actively working towards change)    MI Intervention: Expressed Empathy/Understanding, Supported Autonomy, Collaboration, Evocation, Permission to raise concern or advise, Open-ended questions, Reflections: simple and complex, Rolled with resistance: Emphasized patient autonomy, Complex reflection, and Evoked patient agenda, Change talk (evoked), and Reframe     Change Talk Expressed by the Patient: Desire to change Ability to change Reasons to change Need to change Committment to change Activation Taking steps    Provider Response to Change Talk: E - Evoked more info from patient about behavior change, A - Affirmed patient's thoughts, decisions, or attempts at behavior change, R - Reflected patient's change talk, and S - Summarized patient's change talk statements      Assessments completed prior to visit:  The following assessments were completed by patient for this visit:  PHQ9:       8/21/2023    10:57 AM 8/28/2023    10:53 AM 9/19/2023     1:27 PM 10/3/2023    12:23 PM 10/16/2023    12:29 PM 11/13/2023    10:00 AM 11/27/2023    10:00 AM   PHQ-9 SCORE   PHQ-9 Total Score MyChart 10 (Moderate depression) 11 (Moderate depression) 11 (Moderate depression) 6 (Mild depression) 4 (Minimal depression) 4 (Minimal depression) 4 (Minimal depression)   PHQ-9 Total Score 10 11 11 6 4 4 4     GAD7:       8/9/2023     9:55 AM 8/28/2023    10:54 AM 9/18/2023    10:58 AM 9/19/2023     1:27 PM 10/3/2023    12:23 PM 11/13/2023    10:00 AM 11/27/2023    10:01 AM   JM-7 SCORE   Total Score 15 (severe anxiety) 16 (severe anxiety) 12 (moderate anxiety) 16 (severe anxiety) 6 (mild anxiety) 5 (mild anxiety) 6 (mild anxiety)   Total Score 15 16 12 16 6 5 6         ASSESSMENT: Current Emotional / Mental Status (status of significant symptoms):   Risk status (Self / Other harm or suicidal ideation)   Patient denies  "current fears or concerns for personal safety.   Patient reports the following current or recent suicidal ideation or behaviors: Passive suicidal ideation that is \"on and off\". \"It's the same thoughts as usual, I just want all the crap in life to go away\". A safety plan has been agreed to with the writer and the patient contracted for safety in session, while also displaying future forward thinking.   Patient denies current or recent homicidal ideation or behaviors.   Patient denies current or recent self injurious behavior or ideation.   Patient denies other safety concerns.   Patient reports there has been no change in risk factors since their last session.     Patient reports there has been no change in protective factors since their last session.     A safety and risk management plan has been developed including: When the patient identifies the following:          [facility]                                        Viktoriya Lovelace             SAFETY PLAN:  Step 1: Warning signs / cues (Thoughts, images, mood, situation, behavior) that a crisis may be developing:  Thoughts: \"I am just so overwhelmed\".   Images: None at this time.   Thinking Processes: ruminations (can't stop thinking about my problems): feeling stuck on each emotion and racing thoughts  Mood: intense worry  Behaviors: isolating/withdrawing  and crying   Situations: relationship problems   Step 2: Coping strategies - Things I can do to take my mind off of my problems without contacting another person (relaxation technique, physical activity):  Distress Tolerance Strategies:  Utilizing grounding exercises and sensory experiences  Physical Activities: Coloring   Focus on helpful thoughts:  think about happy memories: the kids and pets   Step 3: People and social settings that provide distraction:              Name: Work    Phone: N/A               Name: Talha            Phone: The patient has Talha's phone number           Step 4: " Remind myself of people and things that are important to me and worth living for:  The patients kids and pets.         Step 5: When I am in crisis, I can ask these people to help me use my safety plan:              Name: Talha            Phone: The patient has Talha's phone number              Name: Amy    Phone: The patient has Amy's phone number                 Step 6: Making the environment safe:   remove drugs  Step 7: Professionals or agencies I can contact during a crisis:  Suicide Prevention Lifeline: Call or Text 988   Local Crisis Services: 911     Call 911 or go to my nearest emergency department.      I helped develop this safety plan and agree to use it when needed.  I have been given a copy of this plan.       Client signature _________________________________________________________________  Today s date:  7/7/2023  Completed by Provider Name/ Credentials:  STEPHAN Lebron LGSW                       July 7, 2023  Adapted from Safety Plan Template 2008 Kitty Kaye and Ubaldo Pineda is reprinted with the express permission of the authors.  No portion of the Safety Plan Template may be reproduced without the express, written permission.  You can contact the authors at [email or web address] or [email or web address].          Appearance:   Appropriate    Eye Contact:   Good    Psychomotor Behavior: Normal    Attitude:   Cooperative  Interested Friendly   Orientation:   All   Speech    Rate / Production: Emotional    Volume:  Normal    Mood:    Sad    Affect:    Appropriate    Thought Content:  Clear    Thought Form:  Coherent  Logical    Insight:    Good      Medication Review:   No changes to current psychiatric medication(s)     Medication Compliance:   Yes     Changes in Health Issues:   None reported     Chemical Use Review:   Substance Use: Chemical use reviewed, no active concerns identified      Tobacco Use: No current tobacco use.      Diagnosis:  1. Mild episode of recurrent major  depressive disorder (H24)    2. Generalized anxiety disorder                      Collateral Reports Completed:   Routed note to PCP    PLAN: (Patient Tasks / Therapist Tasks / Other)  The patient is to continue establishing boundaries within her internal family unit and managing ongoing stressors. The writer will discharge the patient from service at the patient's request. The writer did educate the patient on the process of restarting therapy if the patient chooses. The patient can return to outpatient mental health therapy at any point in the future.         STEPHAN Alba [facility]           November 27th, 2023                                                         ______________________________________________________________________    Individual Treatment Plan    Patient's Name: Viktoriya Lovelace  YOB: 1980    Date of Creation: 08/09/2023  Date Treatment Plan Last Reviewed/Revised: 11/27/2023    DSM5 Diagnoses: 296.32 (F33.1) Major Depressive Disorder, Recurrent Episode, Moderate _ or 300.02 (F41.1) Generalized Anxiety Disorder  Psychosocial / Contextual Factors: The patient reported concurrent ongoing family stressors that have significantly impacted the patients way of being. The patient is also in need of new housing in the next six months due to similar internal family stressors. The patient is experiencing maladaptive symptoms due to mental health medications and is consulting her primary care provider to address her concerns. Overall, the patient is experiencing significant symptoms of anxiety and depression including,  feeling overwhelmed with feelings of sadness and anxiety, difficulty sleeping and excessive sleep, frequent crying, avoiding social situations, and isolating. The patient also reported having little interest or pleasure in doing things, feeling down, feeling tired, a low sense of self-esteem, and difficulty concentrating. In terms of anxious symptoms, the patient  reported feelings of being nervious, difficulty controlling worry, worrying too much about different things, trouble relaxing, and increased irritability.   PROMIS (reviewed every 90 days): Reviewed with the patient on 11/13/2023    Referral / Collaboration:  Referral to another professional/service is not indicated at this time..    Anticipated number of session for this episode of care: 9-12 sessions  Anticipation frequency of session: Biweekly  Anticipated Duration of each session: 38-52 minutes  Treatment plan will be reviewed in 90 days or when goals have been changed.       MeasurableTreatment Goal(s) related to diagnosis / functional impairment(s)  Goal 1: SI - Patient will - report absence of persistent SI and report of reduced frequency and intensity of SI and absence of SI to acceptable levels, report subjective improved mood for a period of 90 days, within 6 months as clinically observed and by patient self-report    I will know I've met my goal when I can get rid of the SI and control it easier.      Objective #A (Patient Action)    Patient will Decrease thoughts that you'd be better off dead or of suicide / self-harm.  Status: Continued - Date(s): Continous    Intervention(s)  Therapist will  create a safety plan with the patient, educate the patient on available safety resources, and assist the patient in identifying triggers for SI .    Goal 2 Coping Skills: Client will report ability to utilize coping skills to acceptable levels, report subjective improved self-care for a period of 90 days, within 6 months as clinically observed and by patient self-report     I will know I've met my goal when I have my life back and feel like I can be happy again with all of the stress under control.      Objective #A (Client Action)    Client will compile a list of boundaries that they would like to set with others. The patient will identify at least three boundaries that she would like to establish within her  social relationships .  Status: Completed - Date: 11/27/2023       Intervention(s)  Therapist will teach about healthy boundaries. The writer will discuss healthy boundaries with the patient and the importance of boundary setting .    Objective #B  Client will identify three strategies to more effectively address stressors.    Status: Completed - Date: 11/27/2023      Intervention(s)  Therapist will explore coping skills with the patient. The therapist and patient will work together to identify a minimum of three coping skills the patient can utilize to effectively reduce stressors.          Patient has reviewed and agreed to the above plan.      STEPHAN Alba LGSW  November 27th, 2023

## 2024-04-10 ENCOUNTER — HOSPITAL ENCOUNTER (EMERGENCY)
Facility: HOSPITAL | Age: 44
Discharge: HOME | End: 2024-04-10
Attending: STUDENT IN AN ORGANIZED HEALTH CARE EDUCATION/TRAINING PROGRAM | Admitting: STUDENT IN AN ORGANIZED HEALTH CARE EDUCATION/TRAINING PROGRAM
Payer: OTHER GOVERNMENT

## 2024-04-10 VITALS
HEIGHT: 69 IN | SYSTOLIC BLOOD PRESSURE: 120 MMHG | RESPIRATION RATE: 16 BRPM | WEIGHT: 186 LBS | DIASTOLIC BLOOD PRESSURE: 76 MMHG | OXYGEN SATURATION: 97 % | BODY MASS INDEX: 27.55 KG/M2 | TEMPERATURE: 97.6 F | HEART RATE: 89 BPM

## 2024-04-10 DIAGNOSIS — M54.50 LOW BACK PAIN: ICD-10-CM

## 2024-04-10 PROCEDURE — 99284 EMERGENCY DEPT VISIT MOD MDM: CPT

## 2024-04-10 RX ORDER — CYCLOBENZAPRINE HCL 10 MG
10 TABLET ORAL 3 TIMES DAILY PRN
Qty: 15 TABLET | Refills: 0 | Status: SHIPPED | OUTPATIENT
Start: 2024-04-10 | End: 2024-04-16

## 2024-04-10 RX ORDER — LIDOCAINE 50 MG/G
1 PATCH TOPICAL EVERY 24 HOURS
Qty: 5 PATCH | Refills: 0 | Status: SHIPPED | OUTPATIENT
Start: 2024-04-10 | End: 2024-04-16

## 2024-04-10 RX ORDER — PREDNISONE 20 MG/1
TABLET ORAL
Qty: 10 TABLET | Refills: 0 | Status: SHIPPED | OUTPATIENT
Start: 2024-04-10 | End: 2024-04-16

## 2024-04-10 ASSESSMENT — COLUMBIA-SUICIDE SEVERITY RATING SCALE - C-SSRS
2. HAVE YOU ACTUALLY HAD ANY THOUGHTS OF KILLING YOURSELF IN THE PAST MONTH?: NO
1. IN THE PAST MONTH, HAVE YOU WISHED YOU WERE DEAD OR WISHED YOU COULD GO TO SLEEP AND NOT WAKE UP?: NO
6. HAVE YOU EVER DONE ANYTHING, STARTED TO DO ANYTHING, OR PREPARED TO DO ANYTHING TO END YOUR LIFE?: NO

## 2024-04-10 ASSESSMENT — ACTIVITIES OF DAILY LIVING (ADL): ADLS_ACUITY_SCORE: 33

## 2024-04-10 NOTE — ED TRIAGE NOTES
Pt presents with low back pain that started at 0415 this am. Pt states she got up for work, heard a pop and have been unable to stand up straight. Pain radiates down right leg.     Triage Assessment (Adult)       Row Name 04/10/24 0756          Triage Assessment    Airway WDL WDL        Respiratory WDL    Respiratory WDL WDL        Skin Circulation/Temperature WDL    Skin Circulation/Temperature WDL WDL        Cardiac WDL    Cardiac WDL WDL        Peripheral/Neurovascular WDL    Peripheral Neurovascular WDL WDL        Cognitive/Neuro/Behavioral WDL    Cognitive/Neuro/Behavioral WDL WDL

## 2024-04-10 NOTE — DISCHARGE INSTRUCTIONS
You may take 600 mg of ibuprofen (Advil) every six hours as well as 650 or 1000 mg of acetaminophen (Tylenol) every six hours for pain.    You may also take the Flexeril (muscle relaxer) as prescribed. This can make you drowsy. Don't drive after taking this medication.     Please follow up with the spine clinic.

## 2024-04-10 NOTE — ED PROVIDER NOTES
Emergency Department Encounter   NAME: Viktoriya Lovelace  AGE: 43 year old female  YOB: 1980  MRN: [id number]    PCP: [facility]  ED PROVIDER: Karolyn Cochran PA-C    Evaluation Date & Time:   4/10/2024  7:58 AM    CHIEF COMPLAINT:  Back Pain      Impression and Plan   MDM: 42 yo F with history of anxiety and migraines presents for evaluation of low back pain. She was getting ready for work when she heard a pop and had sudden R sided low back pain radiating down the R leg. She has been able to ambulate. On arrival patient is vitally stable, afebrile. On exam patient has tenderness to palpation over lumbar paraspinal muscles. Full hip, knee, and ankle ROM. Neurovascularly intact. No saddle anesthesia, incontinence, IV drug use, fever/chills concerning for cauda equina or spinal epidural abscess. No trauma concerning for fracture and patient is otherwise healthy. No concerning spinal findings on last abdominal CT on 5/25/23 - no indication for advanced imaging today. Discussed treatment with flexeril, ibuprofen/Tylenol, prednisone, and lidocaine patches as this has worked for her in the past. Also suggested follow up with spine clinic for prevention tactics. She is agreeable to this. We ds I have staffed the patient with Dr. Kelley, ED MD, who has evaluated the patient and agrees with all aspects of today's care. Discussed red flag symptoms and strict return precautions given. Patient discharged home in stable condition.       ED Course as of 04/10/24 0839   Wed Apr 10, 2024   0820 I met and introduced myself to the patient. I gathered initial history and performed my physical exam. We discussed plan for initial workup.          Medical Decision Making  Obtained supplemental history:Supplemental history obtained?: No  Reviewed external records: External records reviewed?: Documented in chart and Outpatient Record: [facility] 3/17/24  Care impacted by chronic illness:N/A  Care significantly affected by social  determinants of health:N/A  Did you consider but not order tests?: Work up considered but not performed and documented in chart, if applicable  Did you interpret images independently?: My independent interpretation of imaging: none  Consultation discussion with other provider:Did you involve another provider (consultant, , pharmacy, etc.)?: No  Discharge. I prescribed additional prescription strength medication(s) as charted. N/A.   At the conclusion of the encounter I discussed the results of all the tests and the disposition. The questions were answered. The patient or family acknowledged understanding and was agreeable with the care plan.    FINAL IMPRESSION:    ICD-10-CM    1. Low back pain  M54.50 Spine  Referral            MEDICATIONS GIVEN IN THE EMERGENCY DEPARTMENT:  Medications - No data to display      NEW PRESCRIPTIONS STARTED AT TODAY'S ED VISIT:  New Prescriptions    No medications on file         HPI   Patient information was obtained from: patient   Use of Intrepreter: N/A     Viktoriya Lovelace is a 43 year old female with a pertinent history of migraine, anxiety who presents to the ED for evaluation of low back pain. She was getting ready for work this morning and she felt a pop in her low back. She then had R sided low back pain and tingling radiating down her R leg. She has been able to walk, but with limping and pain. Denies saddle anesthesia, bowel/bladder incontinence, IV drug use, fever, chills, trauma.       REVIEW OF SYSTEMS:  Pertinent positive and negative symptoms per HPI.       Medical History     No past medical history on file.    Past Surgical History:   Procedure Laterality Date    OTHER SURGICAL HISTORY      Cosmetic Ear Surgery    TUBAL LIGATION         Family History   Problem Relation Age of Onset    Melanoma Mother         on face    Diabetes Type 2  Father     Coronary Artery Disease Father     Acute Myocardial Infarction Father        Social History     Tobacco Use  "   Smoking status: Former     Current packs/day: 0.00     Average packs/day: 0.8 packs/day for 16.0 years (12.0 ttl pk-yrs)     Types: Cigarettes     Start date:      Quit date: 2012     Years since quittin.2    Smokeless tobacco: Never   Substance Use Topics    Alcohol use: No    Drug use: No       hydrOXYzine (ATARAX) 25 MG tablet  LORazepam (ATIVAN) 0.5 MG tablet          Physical Exam     First Vitals:  Patient Vitals for the past 24 hrs:   BP Temp Temp src Pulse Resp SpO2 Height Weight   04/10/24 0755 132/81 97.6  F (36.4  C) Temporal 89 18 100 % 1.753 m (5' 9\") 84.4 kg (186 lb)       PHYSICAL EXAM:   General Appearance:  Alert, cooperative, no distress, appears stated age  HENT: Normocephalic without obvious deformity, atraumatic. Mucous membranes moist   Eyes: Conjunctiva clear, Lids normal. No discharge.   Respiratory: No distress. Lungs clear to ausculation bilaterally. No wheezes, rhonchi or stridor  Cardiovascular: Regular rate and rhythm, no murmur. Normal cap refill. No peripheral edema  Musculoskeletal: Moving all extremities. No gross deformities. Able to bear weight. Lower extremities: sensation intact to light touch. 2+ PT pulses. Full hip, knee, ankle ROM.   Lumbar spine and R SI joint tender to palpation without crepitus or step offs.   Integument: Warm, dry, no rashes or lesions  Neurologic: Alert and orientated x3. No focal deficits.  Psych: Normal mood and affect      Results     LAB:  All pertinent labs reviewed and interpreted  Labs Ordered and Resulted from Time of ED Arrival to Time of ED Departure - No data to display    RADIOLOGY:  No orders to display         Karolyn Cochran PA-C   Emergency Medicine   [facility] EMERGENCY DEPARTMENT       Karolyn Cochran PA-C  04/10/24 0903    "

## 2024-04-10 NOTE — ED PROVIDER NOTES
"Emergency Department Midlevel Supervisory Note     I had a face to face encounter with this patient seen by the Advanced Practice Provider (MAHSA). I personally made/approved the management plan and take responsibility for the patient management. I personally saw patient and performed a substantive portion of the visit including all aspects of the medical decision making.     ED Course:  8:08 AM  Karolyn Cochran PA-C  staffed patient with me. I agree with their assessment and plan of management, and I will see the patient.  8:20 AM I met with the patient to introduce myself, gather additional history, perform my initial exam, and discuss the plan.     Brief HPI:     Viktoriya Lovelace is a 43 year old female who presents for evaluation of low back pain.    Brief Physical Exam: /81   Pulse 89   Temp 97.6  F (36.4  C) (Temporal)   Resp 18   Ht 1.753 m (5' 9\")   Wt 84.4 kg (186 lb)   LMP 03/27/2024   SpO2 100%   BMI 27.47 kg/m    Constitutional:  Alert, in no acute distress  EYES: Conjunctivae clear  HENT:  Atraumatic  Respiratory:  Respirations even, unlabored, in no acute respiratory distress  Cardiovascular:  Regular rate and rhythm, good peripheral perfusion  GI: Soft, non-distended, non-tender  Musculoskeletal:  Moves all 4 extremities equally, grossly symmetrical strength, good plantarflexion and dorsiflexion bilaterally, patient ambulatory.  Does have some reproducible tenderness with palpation in the paraspinal musculature just off midline in the lower lumbar to SI area.  Integument: Warm & dry. No appreciable rash, erythema.  Neurologic:  Alert & oriented, speech clear and fluent, no focal deficits noted  Psych: Normal mood and affect       MDM:  41-year-old female presents for evaluation of right low back pain that started acutely this morning while she was getting dressed.  No other falls, trauma, or injuries.  No incontinence, no saddle anesthesia, has pain in her low back with some tingling in her " right leg.  Does report history of sciatica, thinks this feels somewhat similar.  Good strength in bilateral lower extremities, patient is able to ambulate and transfer self.  No red flag symptoms.  Did consider advanced imaging such as a CT of the lumbar spine, however patient does not have any red flag symptoms, no trauma or injuries, unlikely to be helpful or diagnostic, suspect musculoskeletal strain versus mild lumbar radiculopathy.  Has responded to steroids and muscle relaxers while in the past.  Will refill courses for the same and make referral to the spine clinic to discuss possible physical therapy as she seems to have had a fairly significant back strain without significant mechanism raising concern for possible muscular deficiency/weakness and may benefit from physical therapy.  All questions answered, will discharge home.    1. Low back pain      Procedures:  I was present for the key portions of procedures documented in MAHSA/midlevel note, see midlevel note for further details.    Nahum Kelley MD  [facility] EMERGENCY DEPARTMENT  [address] [place] [id number] [phone]     Nahum Kelley MD  04/10/24 0907

## 2024-04-10 NOTE — Clinical Note
Viktoriya Lovelace was seen and treated in our emergency department on 4/10/2024.  She may return to work on 04/12/2024.       If you have any questions or concerns, please don't hesitate to call.      Karolyn Cochran PA-C

## 2024-04-16 ENCOUNTER — OFFICE VISIT (OUTPATIENT)
Dept: FAMILY MEDICINE | Facility: CLINIC | Age: 44
End: 2024-04-16
Payer: OTHER GOVERNMENT

## 2024-04-16 VITALS
HEART RATE: 97 BPM | TEMPERATURE: 97.9 F | OXYGEN SATURATION: 98 % | RESPIRATION RATE: 18 BRPM | SYSTOLIC BLOOD PRESSURE: 118 MMHG | DIASTOLIC BLOOD PRESSURE: 80 MMHG | BODY MASS INDEX: 27.55 KG/M2 | HEIGHT: 69 IN | WEIGHT: 186 LBS

## 2024-04-16 DIAGNOSIS — M54.16 LUMBAR RADICULOPATHY: ICD-10-CM

## 2024-04-16 DIAGNOSIS — F32.1 CURRENT MODERATE EPISODE OF MAJOR DEPRESSIVE DISORDER WITHOUT PRIOR EPISODE (H): Primary | ICD-10-CM

## 2024-04-16 DIAGNOSIS — F41.1 GAD (GENERALIZED ANXIETY DISORDER): ICD-10-CM

## 2024-04-16 PROCEDURE — 99214 OFFICE O/P EST MOD 30 MIN: CPT | Performed by: STUDENT IN AN ORGANIZED HEALTH CARE EDUCATION/TRAINING PROGRAM

## 2024-04-16 RX ORDER — PREDNISONE 50 MG/1
50 TABLET ORAL DAILY
Qty: 5 TABLET | Refills: 0 | Status: SHIPPED | OUTPATIENT
Start: 2024-04-16 | End: 2024-04-21

## 2024-04-16 RX ORDER — HYDROXYZINE HYDROCHLORIDE 25 MG/1
25 TABLET, FILM COATED ORAL 3 TIMES DAILY PRN
Qty: 90 TABLET | Refills: 3 | Status: SHIPPED | OUTPATIENT
Start: 2024-04-16

## 2024-04-16 ASSESSMENT — ANXIETY QUESTIONNAIRES
8. IF YOU CHECKED OFF ANY PROBLEMS, HOW DIFFICULT HAVE THESE MADE IT FOR YOU TO DO YOUR WORK, TAKE CARE OF THINGS AT HOME, OR GET ALONG WITH OTHER PEOPLE?: SOMEWHAT DIFFICULT
IF YOU CHECKED OFF ANY PROBLEMS ON THIS QUESTIONNAIRE, HOW DIFFICULT HAVE THESE PROBLEMS MADE IT FOR YOU TO DO YOUR WORK, TAKE CARE OF THINGS AT HOME, OR GET ALONG WITH OTHER PEOPLE: SOMEWHAT DIFFICULT
GAD7 TOTAL SCORE: 14
GAD7 TOTAL SCORE: 14
7. FEELING AFRAID AS IF SOMETHING AWFUL MIGHT HAPPEN: SEVERAL DAYS
4. TROUBLE RELAXING: MORE THAN HALF THE DAYS
2. NOT BEING ABLE TO STOP OR CONTROL WORRYING: MORE THAN HALF THE DAYS
6. BECOMING EASILY ANNOYED OR IRRITABLE: NEARLY EVERY DAY
3. WORRYING TOO MUCH ABOUT DIFFERENT THINGS: MORE THAN HALF THE DAYS
5. BEING SO RESTLESS THAT IT IS HARD TO SIT STILL: SEVERAL DAYS
1. FEELING NERVOUS, ANXIOUS, OR ON EDGE: NEARLY EVERY DAY
7. FEELING AFRAID AS IF SOMETHING AWFUL MIGHT HAPPEN: SEVERAL DAYS

## 2024-04-16 ASSESSMENT — PAIN SCALES - GENERAL: PAINLEVEL: MODERATE PAIN (4)

## 2024-04-16 NOTE — PROGRESS NOTES
Assessment and Plan   Patient is a 43 year old female with past medical history of MDD and JM, presenting to the clinic for evaluation of several concerns.     1) Lumbar back pain.   - Seen in the ED recently, no red flag symptoms, no fever, no chills. Condition appears to be improving.   - Will prescribe course of 50 mg Prednisone daily for 5 days   - referral sent out to spine and PT    2)  Requesting referral for Colonoscopy.  - screening guidelines discusses with the patient.  - no concerning history present for earlier screening    3) PMDD  - History and PE consistent with diagnosis of PMDD.  - patient preference is to refrain from SSRIs/ SNRIs due to potential side effects. Not interested in COCs at this time.   - Information provided to patient on diagnosis. Patient will do her own research and will inform physician of desired management.   - referral placed to psychiatry for continued management  - referral placed for psychotherapy.     4) MDD  5) JM  - Refill of Hydroxyzine PRN at bedtime       Follow up: in one year or sooner if needed.     Options for treatment and follow-up care were reviewed with the patient and/or guardian. Viktoriya Lovelace and/or guardian engaged in the decision making process and verbalized understanding of the options discussed and agreed with the final plan.    Dr. Boom Waters         HPI:   Viktoriya Lovelace is a 43 year old  female who presents for evaluation of several complaints.     Patient reports continued back pain that has been bothering her intermittently for the past year.  She has been seen in  and the ED for evaluation of this at times of flare-up in her symptoms. Describes a lumbar mid-back pain that intermittently radiates down her right lig. Reports associated burning sensation at times of flares of this pain. Denies fever, chills, bowel or bladder incontinence, saddle anesthesia, or other red flag symptoms. She was prescribed a short course of prednisone 20  mg daily for this at last ED visit with relief of her right leg symptoms. Was told she would have a spine and PT referral at last ED visit, but was never contacted. Requesting this today.     Patient is also curious about colonoscopy screening after her mother mentioned this to her and her siblings. Denies any family history of colon cancer, inflammatory bowel disease, or other genetic polyposis syndromes. Denies any black or bloody stools, mucous, or other stool changes of concern.     Patient also describes mood swings surrounding her menstrual cycle. She notes that her moodiness and irritability will start 2 weeks before her period and will continue until day 2 of her menstrual cycle. This has been occurring for the past 2 cycles. Reports associated fatigue and low energy during this time frame. Denies having a history of similar in the past.     Patient has a history of MDD and JM and has tried selective serotonin reuptake inhibitor's in the past, but discontinued them due to adverse reactions of increased suicidal ideations. Would like to refrain from this medication class at this time. She had previously seen a therapist that she liked, but was told that the provider is moving locations and was lost to follow up. Takes Hydroxyzine PRN at night to help with awakenings and racing thoughts causing insomnia. Requests refill.     Chief Complaint   Patient presents with    Mood swings     Mood swings and get's angry prior to period    Emergency room x 2 for right low back pain    Fatigue     Fatigue low energy    Referral for colonoscopy      No family hx of colon cancer by family hx of colon polyps              PMHX:     Patient Active Problem List   Diagnosis    Mucous retention cyst of maxillary sinus    Migraine    Dysmenorrhea    Healthcare maintenance    Current moderate episode of major depressive disorder without prior episode (H)    History of nephrolithiasis    JM (generalized anxiety disorder)  "      Social History     Tobacco Use    Smoking status: Former     Current packs/day: 0.00     Average packs/day: 0.8 packs/day for 16.0 years (12.0 ttl pk-yrs)     Types: Cigarettes     Start date:      Quit date: 2012     Years since quittin.2    Smokeless tobacco: Never   Vaping Use    Vaping status: Never Used   Substance Use Topics    Alcohol use: No    Drug use: No       Social History     Social History Narrative    Not on file       No Known Allergies    No results found for this or any previous visit (from the past 24 hour(s)).         Review of Systems:    ROS: 10 point ROS neg other than the symptoms noted above in the HPI.         Physical Exam:     Vitals:    24 1016   BP: 118/80   Pulse: 97   Resp: 18   Temp: 97.9  F (36.6  C)   TempSrc: Temporal   SpO2: 98%   Weight: 84.4 kg (186 lb)   Height: 1.753 m (5' 9\")     Body mass index is 27.47 kg/m .    General appearance: Alert, cooperative, no distress, appears stated age  Head: Normocephalic, atraumatic, without obvious abnormality  Eyes: Pupils equal round, reactive.  Conjunctiva clear.  Neck: Supple, symmetric, trachea midline,  Lungs: Clear to auscultation bilaterally, no wheezing or crackles present.  Respirations unlabored  Heart: Regular rate and rhythm, normal S1 and S2, no murmur, rub or gallop.  Extremities: Extremities normal, atraumatic.  No cyanosis or edema.  Skin: Skin color, texture, turgor normal no rashes or lesions on limited skin exam  Neurologic: CN II through XII intact, normal strength.  Psych: fidgety, slightly anxious appearing. Well-groomed, dressed appropriately for weather. Answers all questions appropriately. Makes eye-contact. Goal orientated. Good thought content. Liner thought organization. No delusions.     "

## 2024-07-21 ENCOUNTER — HEALTH MAINTENANCE LETTER (OUTPATIENT)
Age: 44
End: 2024-07-21

## 2024-09-24 ENCOUNTER — ANCILLARY PROCEDURE (OUTPATIENT)
Dept: MAMMOGRAPHY | Facility: CLINIC | Age: 44
End: 2024-09-24
Attending: STUDENT IN AN ORGANIZED HEALTH CARE EDUCATION/TRAINING PROGRAM
Payer: OTHER GOVERNMENT

## 2024-09-24 DIAGNOSIS — Z12.31 VISIT FOR SCREENING MAMMOGRAM: ICD-10-CM

## 2024-09-24 PROCEDURE — 77063 BREAST TOMOSYNTHESIS BI: CPT

## 2024-09-26 ENCOUNTER — ANCILLARY PROCEDURE (OUTPATIENT)
Dept: MAMMOGRAPHY | Facility: CLINIC | Age: 44
End: 2024-09-26
Attending: STUDENT IN AN ORGANIZED HEALTH CARE EDUCATION/TRAINING PROGRAM
Payer: OTHER GOVERNMENT

## 2024-09-26 DIAGNOSIS — R92.8 ABNORMAL MAMMOGRAM: ICD-10-CM

## 2024-09-26 PROCEDURE — 76642 ULTRASOUND BREAST LIMITED: CPT | Mod: RT

## 2024-09-30 ENCOUNTER — ANCILLARY PROCEDURE (OUTPATIENT)
Dept: MAMMOGRAPHY | Facility: CLINIC | Age: 44
End: 2024-09-30
Attending: STUDENT IN AN ORGANIZED HEALTH CARE EDUCATION/TRAINING PROGRAM
Payer: OTHER GOVERNMENT

## 2024-09-30 DIAGNOSIS — R92.8 ABNORMAL MAMMOGRAM: ICD-10-CM

## 2024-09-30 PROCEDURE — 88342 IMHCHEM/IMCYTCHM 1ST ANTB: CPT | Mod: 26 | Performed by: PATHOLOGY

## 2024-09-30 PROCEDURE — 999N000065 MA POST PROCEDURE RIGHT

## 2024-09-30 PROCEDURE — 88305 TISSUE EXAM BY PATHOLOGIST: CPT | Mod: 26 | Performed by: PATHOLOGY

## 2024-09-30 PROCEDURE — 250N000009 HC RX 250: Performed by: STUDENT IN AN ORGANIZED HEALTH CARE EDUCATION/TRAINING PROGRAM

## 2024-09-30 PROCEDURE — 88305 TISSUE EXAM BY PATHOLOGIST: CPT | Mod: TC | Performed by: STUDENT IN AN ORGANIZED HEALTH CARE EDUCATION/TRAINING PROGRAM

## 2024-09-30 PROCEDURE — 272N000431 US BREAST BIOPSY CORE NEEDLE RIGHT

## 2024-09-30 RX ADMIN — LIDOCAINE HYDROCHLORIDE 10 ML: 10 INJECTION, SOLUTION INFILTRATION; PERINEURAL at 14:38

## 2024-09-30 NOTE — DISCHARGE INSTRUCTIONS
After Your Breast Biopsy    Bleeding, bruising, and pain  Breast tenderness and some bruising is normal and may last several days. You may wear your bra overnight to support the breast.  You may use an ice pack for pain. Place it over the area for 15 to 20 minutes, several times a day.  You may take over-the-counter pain medicine:  On the day of the biopsy, we recommend Tylenol (acetaminophen) because it does not raise your risk of bleeding.  The next day, you may take an anti-inflammatory medicine (aspirin, ibuprofen, Motrin, Aleve, Advil), unless your doctor tells you not to.  Bandages and showering  Keep your bandage in place until tomorrow morning. Don't get it wet.  If you have small pieces of tape on the skin, leave them in place. They will fall off on their own, or you can remove them after 5 days.  You may shower the next morning after your biopsy.  Activity  No heavy activity (no running, no gym workouts, no lifting, no vacuuming, etc.) on the day of your biopsy.  You may go back to normal activity the next day. But limit what you do if you still have pain or discomfort.  Infection  Infection is rare. Signs of infection include:  Fever (including sweats and chills)  Redness  Pain that gets worse  Fluid draining from the biopsy site  Biopsy results  Results may take up to 5 business days.  A nurse or doctor from the Breast Center will call with your results. The system sends the results to the doctor that ordered your biopsy & MyChart automatically.     If you have not gotten your results in 5 days, please call the Breast Center.  Call the Breast Center with questions or if:   You have bleeding that lasts more than 20 minutes.  You have pain that you can't control.  You have signs of infection (fever, sweats, chills, redness, increasing pain, or drainage).  After hours, please call the doctor who ordered your biopsy.     Breast Center Nurse  [phone]    For informational purposes only. Not to replace the  advice of your health care provider. Copyright   2010 [facility]. All rights reserved. Clinically reviewed by Chloe Rojo, Director, [facility]. [employer] [id number] - REV 08/23.

## 2024-10-02 LAB
PATH REPORT.COMMENTS IMP SPEC: NORMAL
PATH REPORT.FINAL DX SPEC: NORMAL
PATH REPORT.GROSS SPEC: NORMAL
PATH REPORT.MICROSCOPIC SPEC OTHER STN: NORMAL
PATH REPORT.RELEVANT HX SPEC: NORMAL
PHOTO IMAGE: NORMAL

## 2024-10-03 ENCOUNTER — TELEPHONE (OUTPATIENT)
Dept: FAMILY MEDICINE | Facility: CLINIC | Age: 44
End: 2024-10-03
Payer: OTHER GOVERNMENT

## 2024-10-03 NOTE — TELEPHONE ENCOUNTER
Writer called patient and left message to return call to clinic.     If patient returns call please relay below results per provider and route to nurse queue if patient has further questions or concerns. .    LIZETH Tucker, RN  [facility]      ----- Message from Boom Mg sent at 10/3/2024 11:55 AM CDT -----  Please call and inform patient her biopsy of her breast was normal    Boom Mg MD

## 2024-10-04 NOTE — TELEPHONE ENCOUNTER
Writer called patient and left message to return call to clinic.     If patient returns call please relay below results per provider and route to nurse queue if patient has further questions or concerns. .    LILA TuckerN, RN  [facility]

## 2024-10-07 ENCOUNTER — OFFICE VISIT (OUTPATIENT)
Dept: FAMILY MEDICINE | Facility: CLINIC | Age: 44
End: 2024-10-07
Payer: OTHER GOVERNMENT

## 2024-10-07 VITALS
RESPIRATION RATE: 14 BRPM | OXYGEN SATURATION: 99 % | SYSTOLIC BLOOD PRESSURE: 107 MMHG | WEIGHT: 188.9 LBS | BODY MASS INDEX: 27.9 KG/M2 | HEART RATE: 79 BPM | TEMPERATURE: 97.8 F | DIASTOLIC BLOOD PRESSURE: 74 MMHG

## 2024-10-07 DIAGNOSIS — R53.83 FATIGUE, UNSPECIFIED TYPE: Primary | ICD-10-CM

## 2024-10-07 LAB
ERYTHROCYTE [DISTWIDTH] IN BLOOD BY AUTOMATED COUNT: 12.9 % (ref 10–15)
HCT VFR BLD AUTO: 33.6 % (ref 35–47)
HGB BLD-MCNC: 10.9 G/DL (ref 11.7–15.7)
MCH RBC QN AUTO: 26.3 PG (ref 26.5–33)
MCHC RBC AUTO-ENTMCNC: 32.4 G/DL (ref 31.5–36.5)
MCV RBC AUTO: 81 FL (ref 78–100)
PLATELET # BLD AUTO: 263 10E3/UL (ref 150–450)
RBC # BLD AUTO: 4.15 10E6/UL (ref 3.8–5.2)
WBC # BLD AUTO: 4.5 10E3/UL (ref 4–11)

## 2024-10-07 PROCEDURE — 85027 COMPLETE CBC AUTOMATED: CPT

## 2024-10-07 PROCEDURE — 82306 VITAMIN D 25 HYDROXY: CPT

## 2024-10-07 PROCEDURE — 83540 ASSAY OF IRON: CPT

## 2024-10-07 PROCEDURE — 84439 ASSAY OF FREE THYROXINE: CPT

## 2024-10-07 PROCEDURE — 36415 COLL VENOUS BLD VENIPUNCTURE: CPT

## 2024-10-07 PROCEDURE — 83550 IRON BINDING TEST: CPT

## 2024-10-07 PROCEDURE — 99213 OFFICE O/P EST LOW 20 MIN: CPT

## 2024-10-07 PROCEDURE — 84443 ASSAY THYROID STIM HORMONE: CPT

## 2024-10-07 ASSESSMENT — PATIENT HEALTH QUESTIONNAIRE - PHQ9
10. IF YOU CHECKED OFF ANY PROBLEMS, HOW DIFFICULT HAVE THESE PROBLEMS MADE IT FOR YOU TO DO YOUR WORK, TAKE CARE OF THINGS AT HOME, OR GET ALONG WITH OTHER PEOPLE: VERY DIFFICULT
SUM OF ALL RESPONSES TO PHQ QUESTIONS 1-9: 11
SUM OF ALL RESPONSES TO PHQ QUESTIONS 1-9: 11

## 2024-10-07 ASSESSMENT — PAIN SCALES - GENERAL: PAINLEVEL: NO PAIN (0)

## 2024-10-07 ASSESSMENT — ENCOUNTER SYMPTOMS: FATIGUE: 1

## 2024-10-07 NOTE — PATIENT INSTRUCTIONS
[employer] brand Vitamin- Multi or prenatal and D3    We could consider a medication from the class SNRI for anxiety/depression  - Venlafaxine   -After you do some research on it, you can let me know if you are interested in trying it    Www.psychologytoday.com website to look up therapists in your area and insurance

## 2024-10-07 NOTE — PROGRESS NOTES
Assessment & Plan     Fatigue, unspecified type  Ongoing, no identified cause. Will rule out underlying pathological condition: anemia, thyroid imbalance, vitamin d deficiency. If normal results, had good discussion with patient regarding mental health management. She is interested in re-starting therapy, needs to figure out insurance. We discussed possibility of trying SNRI. Encouraged her to get daily exercise and outside/sunlight exposure, yoga/meditation or other relaxation techniques.   - CBC with platelets; Future  - TSH with free T4 reflex; Future  - Vitamin D deficiency screening; Future  - CBC with platelets  - TSH with free T4 reflex  - Vitamin D deficiency screening  - Iron and iron binding capacity; Future  - Iron and iron binding capacity    Will follow up once results are back.     Subjective   Dulce is a 44 year old, presenting for the following health issues:  Fatigue (Around one year) and MOOD CHANGES (PMDD)      10/7/2024     1:21 PM   Additional Questions   Roomed by Carmen     History of Present Illness       Reason for visit:  Fatigue and moodiness    She eats 2-3 servings of fruits and vegetables daily.She consumes 1 sweetened beverage(s) daily.She exercises with enough effort to increase her heart rate 10 to 19 minutes per day.  She exercises with enough effort to increase her heart rate 3 or less days per week.   She is taking medications regularly.       Review of Systems  Detailed as above.       Objective    /74 (BP Location: Left arm, Patient Position: Sitting, Cuff Size: Adult Large)   Pulse 79   Temp 97.8  F (36.6  C) (Temporal)   Resp 14   Wt 85.7 kg (188 lb 14.4 oz)   LMP 09/29/2024 (Exact Date)   SpO2 99%   BMI 27.90 kg/m    Body mass index is 27.9 kg/m .  Physical Exam  Vitals and nursing note reviewed.   Constitutional:       Appearance: Normal appearance.   Eyes:      Pupils: Pupils are equal, round, and reactive to light.   Cardiovascular:      Rate and Rhythm:  Normal rate and regular rhythm.   Pulmonary:      Effort: Pulmonary effort is normal.      Breath sounds: Normal breath sounds.   Neurological:      General: No focal deficit present.      Mental Status: She is alert.              Signed Electronically by: SONYA Conn CNP

## 2024-10-08 ENCOUNTER — TELEPHONE (OUTPATIENT)
Dept: FAMILY MEDICINE | Facility: CLINIC | Age: 44
End: 2024-10-08
Payer: OTHER GOVERNMENT

## 2024-10-08 DIAGNOSIS — E03.9 ACQUIRED HYPOTHYROIDISM: Primary | ICD-10-CM

## 2024-10-08 LAB
IRON BINDING CAPACITY (ROCHE): 382 UG/DL (ref 240–430)
IRON SATN MFR SERPL: 7 % (ref 15–46)
IRON SERPL-MCNC: 26 UG/DL (ref 37–145)
T4 FREE SERPL-MCNC: 0.84 NG/DL (ref 0.9–1.7)
TSH SERPL DL<=0.005 MIU/L-ACNC: 6.71 UIU/ML (ref 0.3–4.2)
VIT D+METAB SERPL-MCNC: 10 NG/ML (ref 20–50)

## 2024-10-08 RX ORDER — LEVOTHYROXINE SODIUM 25 UG/1
25 TABLET ORAL DAILY
Qty: 90 TABLET | Refills: 0 | Status: SHIPPED | OUTPATIENT
Start: 2024-10-08

## 2024-10-08 NOTE — TELEPHONE ENCOUNTER
Writer called and relayed results per provider. Patient verbalized understanding and agrees with plan and has no questions at this time.    LIZETH Tucker, RN  [facility]

## 2024-10-08 NOTE — RESULT ENCOUNTER NOTE
Please call and give results    Viktoriya Lovelace  Your results from your recent clinic visit show:  Your thyroid looks low I think you should consider medicine called synthroid to replace this.   Your Vitamin D is low. I recommend supplementing with 1,000 mg OTC daily and rechecking in a couple of months  Your iron is low along with your hemoglobin. Are you having chronic blood loss somewhere such as heavy periods? I recommend this be discussed further with an appointment and you start replacing your iron with an OTC supplement. 325 mg of iron daily    If you have more questions please call the clinic at [phone] or send me a [employer] message    Dr. Boom Waters

## 2024-10-08 NOTE — TELEPHONE ENCOUNTER
Called and spoke to patient and relayed provider message and recommendations. Patient endorses understanding.    Patient states she would like to start synthroid.    Patient endorses having always had heavy menstrual periods, but states there has not been any recent change in her cycle.    Patient requests that the below provider message/recommendations be sent to her via [employer]. Writer will send.    Scheduled patient for first available appointment with Dr. Mg on 10/14.  Patient denies questions at this time.    Christie Armijo RN  [facility]      ----- Message from Boom Mg sent at 10/8/2024  9:07 AM CDT -----  Please call and give results    Viktoriay Lovelace  Your results from your recent clinic visit show:  Your thyroid looks low I think you should consider medicine called synthroid to replace this.   Your Vitamin D is low. I recommend supplementing with 1,000 mg OTC daily and rechecking in a couple of months  Your iron is low along with your hemoglobin. Are you having chronic blood loss somewhere such as heavy periods? I recommend this be discussed further with an appointment and you start replacing your iron with an OTC supplement. 325 mg of iron daily    If you have more questions please call the clinic at [phone] or send me a [employer] message    Dr. Boom Mg

## 2024-10-14 ENCOUNTER — OFFICE VISIT (OUTPATIENT)
Dept: FAMILY MEDICINE | Facility: CLINIC | Age: 44
End: 2024-10-14
Payer: OTHER GOVERNMENT

## 2024-10-14 VITALS
OXYGEN SATURATION: 99 % | HEIGHT: 69 IN | SYSTOLIC BLOOD PRESSURE: 120 MMHG | TEMPERATURE: 97.1 F | WEIGHT: 189 LBS | DIASTOLIC BLOOD PRESSURE: 80 MMHG | BODY MASS INDEX: 27.99 KG/M2 | HEART RATE: 75 BPM

## 2024-10-14 DIAGNOSIS — E03.9 ACQUIRED HYPOTHYROIDISM: Primary | ICD-10-CM

## 2024-10-14 DIAGNOSIS — E55.9 VITAMIN D DEFICIENCY: ICD-10-CM

## 2024-10-14 PROCEDURE — 91320 SARSCV2 VAC 30MCG TRS-SUC IM: CPT | Performed by: STUDENT IN AN ORGANIZED HEALTH CARE EDUCATION/TRAINING PROGRAM

## 2024-10-14 PROCEDURE — 99214 OFFICE O/P EST MOD 30 MIN: CPT | Mod: 25 | Performed by: STUDENT IN AN ORGANIZED HEALTH CARE EDUCATION/TRAINING PROGRAM

## 2024-10-14 PROCEDURE — 90471 IMMUNIZATION ADMIN: CPT | Performed by: STUDENT IN AN ORGANIZED HEALTH CARE EDUCATION/TRAINING PROGRAM

## 2024-10-14 PROCEDURE — 90480 ADMN SARSCOV2 VAC 1/ONLY CMP: CPT | Performed by: STUDENT IN AN ORGANIZED HEALTH CARE EDUCATION/TRAINING PROGRAM

## 2024-10-14 PROCEDURE — 90656 IIV3 VACC NO PRSV 0.5 ML IM: CPT | Performed by: STUDENT IN AN ORGANIZED HEALTH CARE EDUCATION/TRAINING PROGRAM

## 2024-10-14 ASSESSMENT — PAIN SCALES - GENERAL: PAINLEVEL: NO PAIN (0)

## 2024-10-14 NOTE — PROGRESS NOTES
Assessment and Plan   44-year-old female with possible history of dysmenorrhea, generalized anxiety disorder, major depressive disorder who presents in follow-up for abnormal labs.  Patient presented to my partner on 10/7/2024 for fatigue with labs showing hypothyroidism, low vitamin D and iron deficiency anemia.  I recommended starting iron supplementation and vitamin D along with levothyroxine.  She has started first to with already improvement in her fatigue after just a week.  Will start levothyroxine tomorrow.  I recommended continuing this regimen for now and repeating labs in 2 to 3 months.  Discussed heavy periods and options to decrease this to prevent iron deficiency anemia from this.  Patient not interested in any sort of birth control at this point and would rather continue iron supplementation for now.  May consider this in the future if unsuccessful.    1. Acquired hypothyroidism  - TSH with free T4 reflex; Future  - TSH with free T4 reflex    2. Vitamin D deficiency  - Vitamin D Deficiency; Future  - Vitamin D Deficiency    Follow up: 2-3 months for labs as above    Options for treatment and follow-up care were reviewed with the patient and/or guardian. Viktoriya Lovelace and/or guardian engaged in the decision making process and verbalized understanding of the options discussed and agreed with the final plan.    Dr. Boom Waters         HPI:   Viktoriya Lovelace is a 44 year old  female who presents for:    Chief Complaint   Patient presents with    Follow Up     Labs      Patient saw another provider 10/7/24 for fatigue. Lab testing done. Showed anemia, high TSH, and low vitamin D. I placed her on iron, recommended Vitamin D, and starting levothyroxine.    Lab Results   Component Value Date    WBC 4.5 10/07/2024     Lab Results   Component Value Date    RBC 4.15 10/07/2024     Lab Results   Component Value Date    HGB 10.9 10/07/2024     Lab Results   Component Value Date    HCT 33.6 10/07/2024  "    Lab Results   Component Value Date    MCV 81 10/07/2024     Lab Results   Component Value Date    MCH 26.3 10/07/2024     Lab Results   Component Value Date    MCHC 32.4 10/07/2024     Lab Results   Component Value Date    RDW 12.9 10/07/2024     Lab Results   Component Value Date     10/07/2024       TSH   Date Value Ref Range Status   10/07/2024 6.71 (H) 0.30 - 4.20 uIU/mL Final     Free T4   Date Value Ref Range Status   10/07/2024 0.84 (L) 0.90 - 1.70 ng/dL Final            PMHX:     Patient Active Problem List   Diagnosis    Mucous retention cyst of maxillary sinus    Migraine    Dysmenorrhea    Healthcare maintenance    Current moderate episode of major depressive disorder without prior episode (H)    History of nephrolithiasis    JM (generalized anxiety disorder)    Acquired hypothyroidism       Social History     Tobacco Use    Smoking status: Former     Current packs/day: 0.00     Average packs/day: 0.8 packs/day for 16.0 years (12.0 ttl pk-yrs)     Types: Cigarettes     Start date:      Quit date: 2012     Years since quittin.7    Smokeless tobacco: Never   Vaping Use    Vaping status: Never Used   Substance Use Topics    Alcohol use: No    Drug use: No       Social History     Social History Narrative    Not on file       No Known Allergies    No results found for this or any previous visit (from the past 24 hour(s)).         Review of Systems:    ROS: 10 point ROS neg other than the symptoms noted above in the HPI.         Physical Exam:     Vitals:    10/14/24 1217   BP: 120/80   Pulse: 75   Temp: 97.1  F (36.2  C)   SpO2: 99%   Weight: 85.7 kg (189 lb)   Height: 1.753 m (5' 9\")     Body mass index is 27.91 kg/m .    General appearance: Alert, cooperative, no distress, appears stated age  Head: Normocephalic, atraumatic, without obvious abnormality  Eyes: Pupils equal round, reactive.  Conjunctiva clear.  Nose: Nares normal, no drainage.  Throat: Lips, mucosa, tongue normal " mucosa pink and moist  Neck: Supple, symmetric, trachea midline,

## 2024-11-21 ENCOUNTER — HOSPITAL ENCOUNTER (EMERGENCY)
Facility: HOSPITAL | Age: 44
Discharge: HOME | End: 2024-11-21
Attending: EMERGENCY MEDICINE
Payer: OTHER GOVERNMENT

## 2024-11-21 ENCOUNTER — APPOINTMENT (OUTPATIENT)
Dept: RADIOLOGY | Facility: HOSPITAL | Age: 44
End: 2024-11-21
Attending: EMERGENCY MEDICINE
Payer: OTHER GOVERNMENT

## 2024-11-21 VITALS
RESPIRATION RATE: 17 BRPM | OXYGEN SATURATION: 98 % | WEIGHT: 188 LBS | HEART RATE: 63 BPM | DIASTOLIC BLOOD PRESSURE: 75 MMHG | SYSTOLIC BLOOD PRESSURE: 103 MMHG | BODY MASS INDEX: 27.85 KG/M2 | TEMPERATURE: 98.2 F | HEIGHT: 69 IN

## 2024-11-21 DIAGNOSIS — R07.89 ATYPICAL CHEST PAIN: ICD-10-CM

## 2024-11-21 LAB
ALBUMIN SERPL BCG-MCNC: 4.5 G/DL (ref 3.5–5.2)
ALP SERPL-CCNC: 80 U/L (ref 40–150)
ALT SERPL W P-5'-P-CCNC: 11 U/L (ref 0–50)
ANION GAP SERPL CALCULATED.3IONS-SCNC: 12 MMOL/L (ref 7–15)
AST SERPL W P-5'-P-CCNC: 16 U/L (ref 0–45)
BASOPHILS # BLD AUTO: 0 10E3/UL (ref 0–0.2)
BASOPHILS NFR BLD AUTO: 1 %
BILIRUB DIRECT SERPL-MCNC: <0.2 MG/DL (ref 0–0.3)
BILIRUB SERPL-MCNC: 0.3 MG/DL
BUN SERPL-MCNC: 9 MG/DL (ref 6–20)
CALCIUM SERPL-MCNC: 8.5 MG/DL (ref 8.8–10.4)
CHLORIDE SERPL-SCNC: 108 MMOL/L (ref 98–107)
CREAT SERPL-MCNC: 0.9 MG/DL (ref 0.51–0.95)
D DIMER PPP FEU-MCNC: 0.28 UG/ML FEU (ref 0–0.5)
EGFRCR SERPLBLD CKD-EPI 2021: 81 ML/MIN/1.73M2
EOSINOPHIL # BLD AUTO: 0.1 10E3/UL (ref 0–0.7)
EOSINOPHIL NFR BLD AUTO: 2 %
ERYTHROCYTE [DISTWIDTH] IN BLOOD BY AUTOMATED COUNT: 14.6 % (ref 10–15)
GLUCOSE SERPL-MCNC: 101 MG/DL (ref 70–99)
HCG SERPL QL: NEGATIVE
HCO3 SERPL-SCNC: 24 MMOL/L (ref 22–29)
HCT VFR BLD AUTO: 38.5 % (ref 35–47)
HGB BLD-MCNC: 12.6 G/DL (ref 11.7–15.7)
IMM GRANULOCYTES # BLD: 0 10E3/UL
IMM GRANULOCYTES NFR BLD: 0 %
INR PPP: 0.94 (ref 0.85–1.15)
LIPASE SERPL-CCNC: 42 U/L (ref 13–60)
LYMPHOCYTES # BLD AUTO: 1.6 10E3/UL (ref 0.8–5.3)
LYMPHOCYTES NFR BLD AUTO: 35 %
MAGNESIUM SERPL-MCNC: 2.2 MG/DL (ref 1.7–2.3)
MCH RBC QN AUTO: 27.9 PG (ref 26.5–33)
MCHC RBC AUTO-ENTMCNC: 32.7 G/DL (ref 31.5–36.5)
MCV RBC AUTO: 85 FL (ref 78–100)
MONOCYTES # BLD AUTO: 0.3 10E3/UL (ref 0–1.3)
MONOCYTES NFR BLD AUTO: 7 %
NEUTROPHILS # BLD AUTO: 2.5 10E3/UL (ref 1.6–8.3)
NEUTROPHILS NFR BLD AUTO: 55 %
NRBC # BLD AUTO: 0 10E3/UL
NRBC BLD AUTO-RTO: 0 /100
PLATELET # BLD AUTO: 272 10E3/UL (ref 150–450)
POTASSIUM SERPL-SCNC: 3.7 MMOL/L (ref 3.4–5.3)
PROT SERPL-MCNC: 7.1 G/DL (ref 6.4–8.3)
RBC # BLD AUTO: 4.52 10E6/UL (ref 3.8–5.2)
SODIUM SERPL-SCNC: 144 MMOL/L (ref 135–145)
T4 FREE SERPL-MCNC: 1.01 NG/DL (ref 0.9–1.7)
TROPONIN T SERPL HS-MCNC: <6 NG/L
TSH SERPL DL<=0.005 MIU/L-ACNC: 8.82 UIU/ML (ref 0.3–4.2)
WBC # BLD AUTO: 4.5 10E3/UL (ref 4–11)

## 2024-11-21 PROCEDURE — 82248 BILIRUBIN DIRECT: CPT | Performed by: EMERGENCY MEDICINE

## 2024-11-21 PROCEDURE — 85379 FIBRIN DEGRADATION QUANT: CPT | Performed by: EMERGENCY MEDICINE

## 2024-11-21 PROCEDURE — 250N000011 HC RX IP 250 OP 636: Performed by: EMERGENCY MEDICINE

## 2024-11-21 PROCEDURE — 83735 ASSAY OF MAGNESIUM: CPT | Performed by: EMERGENCY MEDICINE

## 2024-11-21 PROCEDURE — 84443 ASSAY THYROID STIM HORMONE: CPT | Performed by: EMERGENCY MEDICINE

## 2024-11-21 PROCEDURE — 85610 PROTHROMBIN TIME: CPT | Performed by: EMERGENCY MEDICINE

## 2024-11-21 PROCEDURE — 71046 X-RAY EXAM CHEST 2 VIEWS: CPT

## 2024-11-21 PROCEDURE — 96374 THER/PROPH/DIAG INJ IV PUSH: CPT

## 2024-11-21 PROCEDURE — 84439 ASSAY OF FREE THYROXINE: CPT | Performed by: EMERGENCY MEDICINE

## 2024-11-21 PROCEDURE — 36415 COLL VENOUS BLD VENIPUNCTURE: CPT | Performed by: EMERGENCY MEDICINE

## 2024-11-21 PROCEDURE — 84703 CHORIONIC GONADOTROPIN ASSAY: CPT | Performed by: EMERGENCY MEDICINE

## 2024-11-21 PROCEDURE — 96375 TX/PRO/DX INJ NEW DRUG ADDON: CPT

## 2024-11-21 PROCEDURE — 83690 ASSAY OF LIPASE: CPT | Performed by: EMERGENCY MEDICINE

## 2024-11-21 PROCEDURE — 99285 EMERGENCY DEPT VISIT HI MDM: CPT | Mod: 25

## 2024-11-21 PROCEDURE — 93005 ELECTROCARDIOGRAM TRACING: CPT | Performed by: EMERGENCY MEDICINE

## 2024-11-21 PROCEDURE — 80053 COMPREHEN METABOLIC PANEL: CPT | Performed by: EMERGENCY MEDICINE

## 2024-11-21 PROCEDURE — 85025 COMPLETE CBC W/AUTO DIFF WBC: CPT | Performed by: EMERGENCY MEDICINE

## 2024-11-21 PROCEDURE — 250N000013 HC RX MED GY IP 250 OP 250 PS 637: Performed by: EMERGENCY MEDICINE

## 2024-11-21 PROCEDURE — 84484 ASSAY OF TROPONIN QUANT: CPT | Performed by: EMERGENCY MEDICINE

## 2024-11-21 RX ORDER — MAGNESIUM HYDROXIDE/ALUMINUM HYDROXICE/SIMETHICONE 120; 1200; 1200 MG/30ML; MG/30ML; MG/30ML
15 SUSPENSION ORAL ONCE
Status: COMPLETED | OUTPATIENT
Start: 2024-11-21 | End: 2024-11-21

## 2024-11-21 RX ORDER — FAMOTIDINE 20 MG/1
20 TABLET, FILM COATED ORAL 2 TIMES DAILY
Qty: 30 TABLET | Refills: 0 | Status: SHIPPED | OUTPATIENT
Start: 2024-11-21

## 2024-11-21 RX ORDER — SUCRALFATE ORAL 1 G/10ML
1 SUSPENSION ORAL 4 TIMES DAILY
Qty: 414 ML | Refills: 2 | Status: SHIPPED | OUTPATIENT
Start: 2024-11-21

## 2024-11-21 RX ORDER — LORAZEPAM 0.5 MG/1
1 TABLET ORAL ONCE
Status: COMPLETED | OUTPATIENT
Start: 2024-11-21 | End: 2024-11-21

## 2024-11-21 RX ORDER — MORPHINE SULFATE 4 MG/ML
4 INJECTION, SOLUTION INTRAMUSCULAR; INTRAVENOUS ONCE
Status: COMPLETED | OUTPATIENT
Start: 2024-11-21 | End: 2024-11-21

## 2024-11-21 RX ADMIN — MORPHINE SULFATE 4 MG: 4 INJECTION, SOLUTION INTRAMUSCULAR; INTRAVENOUS at 09:38

## 2024-11-21 RX ADMIN — FAMOTIDINE 20 MG: 10 INJECTION, SOLUTION INTRAVENOUS at 08:53

## 2024-11-21 RX ADMIN — ALUMINUM HYDROXIDE, MAGNESIUM HYDROXIDE, AND SIMETHICONE 15 ML: 200; 200; 20 SUSPENSION ORAL at 08:53

## 2024-11-21 RX ADMIN — LORAZEPAM 1 MG: 0.5 TABLET ORAL at 08:52

## 2024-11-21 ASSESSMENT — COLUMBIA-SUICIDE SEVERITY RATING SCALE - C-SSRS
6. HAVE YOU EVER DONE ANYTHING, STARTED TO DO ANYTHING, OR PREPARED TO DO ANYTHING TO END YOUR LIFE?: NO
1. IN THE PAST MONTH, HAVE YOU WISHED YOU WERE DEAD OR WISHED YOU COULD GO TO SLEEP AND NOT WAKE UP?: NO
2. HAVE YOU ACTUALLY HAD ANY THOUGHTS OF KILLING YOURSELF IN THE PAST MONTH?: NO

## 2024-11-21 ASSESSMENT — ACTIVITIES OF DAILY LIVING (ADL)
ADLS_ACUITY_SCORE: 0

## 2024-11-21 NOTE — DISCHARGE INSTRUCTIONS
Take the Pepcid & Carafate as prescribed. These help with acid-related pain. You can also try over-the-counter Tums & Maalox for this.    I made a referral to Cardiology for you; they will be calling you in the next couple days. You can also call them sooner to arrange follow up.    Follow up with your Primary Care provider in 2 days for a recheck.    Return to the Emergency Department for any difficulty breathing, persistent vomiting, severe worsening, or any other concerns.

## 2024-11-21 NOTE — ED PROVIDER NOTES
EMERGENCY DEPARTMENT ENCOUNTER      NAME: Viktoriya Lovelace  AGE: 44 year old female  YOB: 1980  MRN: [id number]  EVALUATION DATE & TIME: 11/21/2024  8:19 AM    PCP: [facility]    ED PROVIDER: Michel Luciano M.D.      Chief Complaint   Patient presents with    Chest Pain         IMPRESSION  1. Atypical chest pain        PLAN  - Pepcid/Carafate for home  - close PCP follow up  - discharge to home    ED COURSE & MEDICAL DECISION MAKING    ED Course as of 11/21/24 1156   Thu Nov 21, 2024   0817 44yoF with history of hypothyroidism, migraines, anxiety presenting with her significant other from home for evaluation of chest pain. Reports she went to bed last night feeling fine, then awoke around 6:30am (about 1.5 hours prior to arrival) with central/left chest tightness. Constant & ongoing since; wrapping around her chest to the back. Mild shortness of breath. Not pleuritic or exertional. No cough, fevers, sweats, chills. No abdominal pain, nausea, vomiting. Took her prescribed Atarax but no relief from this; doesn't feel like prior anxiety to her. Denies new foods, alcohol, drug use yesterday.    Normal vitals on presentation. Anxious affect on exam with clear lungs, normal work of breathing, no chest wall tenderness, benign abdomen, no peripheral edema, normal neuro exam.    Doubt acute aortic syndrome. Anxiety certainly may be playing a role. Will obtain EKG, blood, chest imaging per d-dimer while giving GI cocktail, Pepcid, Ativan for symptoms. Patient comfortable with this plan; no further questions at this time.   0958 CXR independently reviewed & interpreted by me: no acute cardiopulmonary process.   1141 EKG unremarkable with no arrhythmia or ischemic changes; high-sensitivity troponin reassuring against ACS. Patient low risk for PE but not PERC negative; d-dimer negative though; PE ruled out at this time. CXR with no acute abnormality or explanatory pathology. Labs with no OCTAVIA, no glaring  electrolyte abnormality, normal bilirubin/LFTs/lipase, no leukocytosis, no anemia.    Patient feeling improved on recheck after meds; esophageal spasm vs anxiety top the differential. No concern for ongoing emergent life-threatening etiology; ok for outpatient management. Patient able to tolerate PO and walk without difficulty. Patient comfortable with discharge at this time. Return precautions and need for PCP & Cardiology follow up discussed and understood. No further questions at the time of discharge.       --------------------------------------------------------------------------------   --------------------------------------------------------------------------------           This patient involved a high degree of complexity in medical decision making, as significant risks were present and assessed. Recent encounters & results in medical record reviewed by me.    All workup (i.e. any EKG/labs/imaging as per charting below) reviewed and independently interpreted by me. See respective sections for details.    Broad differential considered for this patient, including but not limited to:  ACS, PE, acute aortic syndrome, pneumothorax, Boerhaave's, tamponade, anxiety, pneumonia, sepsis, CHF, pancreatitis, other referred intraabdominal etiology, chest wall pain      See additional MDM below if interested.    MEDICATIONS GIVEN IN THE EMERGENCY DEPARTMENT  Medications   alum & mag hydroxide-simethicone (MAALOX) suspension 15 mL (15 mLs Oral $Given 11/21/24 0853)   LORazepam (ATIVAN) tablet 1 mg (1 mg Oral $Given 11/21/24 0852)   famotidine (PEPCID) injection 20 mg (20 mg Intravenous $Given 11/21/24 0853)   morphine (PF) injection 4 mg (4 mg Intravenous $Given 11/21/24 0938)       NEW PRESCRIPTIONS STARTED AT TODAY'S ER VISIT  Current Discharge Medication List        START taking these medications    Details   famotidine (PEPCID) 20 MG tablet Take 1 tablet (20 mg) by mouth 2 times daily.  Qty: 30 tablet, Refills: 0       sucralfate (CARAFATE) 1 GM/10ML suspension Take 10 mLs (1 g) by mouth 4 times daily.  Qty: 414 mL, Refills: 2           CONTINUE these medications which have NOT CHANGED    Details   hydrOXYzine HCl (ATARAX) 25 MG tablet Take 1 tablet (25 mg) by mouth 3 times daily as needed for anxiety  Qty: 90 tablet, Refills: 3    Associated Diagnoses: JM (generalized anxiety disorder)      levothyroxine (SYNTHROID/LEVOTHROID) 25 MCG tablet Take 1 tablet (25 mcg) by mouth daily.  Qty: 90 tablet, Refills: 0    Associated Diagnoses: Acquired hypothyroidism                 =================================================================      HPI  Viktoriya Lovelace is a 44 year old female with history of hypothyroidism, migraines, anxiety presenting with her significant other from home for evaluation of chest pain. Reports she went to bed last night feeling fine, then awoke around 6:30am (about 1.5 hours prior to arrival) with central/left chest tightness. Constant & ongoing since; wrapping around her chest to the back. Mild shortness of breath. Not pleuritic or exertional. No cough, fevers, sweats, chills. No abdominal pain, nausea, vomiting. Took her prescribed Atarax but no relief from this; doesn't feel like prior anxiety to her. Denies new foods, alcohol, drug use yesterday.          --------------- MEDICAL HISTORY ---------------  PAST MEDICAL HISTORY:  Reviewed by me.  No past medical history on file.  Patient Active Problem List   Diagnosis    Mucous retention cyst of maxillary sinus    Migraine    Dysmenorrhea    Healthcare maintenance    Current moderate episode of major depressive disorder without prior episode (H)    History of nephrolithiasis    JM (generalized anxiety disorder)    Acquired hypothyroidism       PAST SURGICAL HISTORY:  Reviewed by me.  Past Surgical History:   Procedure Laterality Date    OTHER SURGICAL HISTORY      Cosmetic Ear Surgery    TUBAL LIGATION         CURRENT MEDICATIONS:    Reviewed by  me.  No current facility-administered medications for this encounter.    Current Outpatient Medications:     famotidine (PEPCID) 20 MG tablet, Take 1 tablet (20 mg) by mouth 2 times daily., Disp: 30 tablet, Rfl: 0    sucralfate (CARAFATE) 1 GM/10ML suspension, Take 10 mLs (1 g) by mouth 4 times daily., Disp: 414 mL, Rfl: 2    hydrOXYzine HCl (ATARAX) 25 MG tablet, Take 1 tablet (25 mg) by mouth 3 times daily as needed for anxiety, Disp: 90 tablet, Rfl: 3    levothyroxine (SYNTHROID/LEVOTHROID) 25 MCG tablet, Take 1 tablet (25 mcg) by mouth daily., Disp: 90 tablet, Rfl: 0    ALLERGIES:  Reviewed by me.  No Known Allergies    FAMILY HISTORY:  Reviewed by me.  Family History   Problem Relation Age of Onset    Melanoma Mother         on face    Diabetes Type 2  Father     Coronary Artery Disease Father     Acute Myocardial Infarction Father        SOCIAL HISTORY:   Reviewed by me.  Social History     Socioeconomic History    Marital status:    Tobacco Use    Smoking status: Former     Current packs/day: 0.00     Average packs/day: 0.8 packs/day for 16.0 years (12.0 ttl pk-yrs)     Types: Cigarettes     Start date:      Quit date: 2012     Years since quittin.8    Smokeless tobacco: Never   Vaping Use    Vaping status: Never Used   Substance and Sexual Activity    Alcohol use: No    Drug use: No    Sexual activity: Not Currently     Partners: Male     Social Drivers of Health     Financial Resource Strain: Medium Risk (2022)    Received from [facility]    Financial Resource Strain     Financial Concerns: 2   Transportation Needs: Unknown (2021)    Received from [facility]    Transportation Needs     Lack of Transportation (Medical): 0   Physical Activity: Unknown (2021)    Received from [facility]    Physical Activity      Calculated Minutes of Exercise per Week:: 0   Stress: Unknown (8/19/2021)    Received from [facility]    Stress     Patient Reported Major Stressor(s): 0     Patient Reported Strengths: 1     Patient Reported Source(s) of Support: 1   Social Connections: Unknown (8/19/2021)    Received from [facility]    Social Connections     Social Connection Calculated Score: 0   Interpersonal Safety: Low Risk  (10/14/2024)    Interpersonal Safety     Do you feel physically and emotionally safe where you currently live?: Yes     Within the past 12 months, have you been hit, slapped, kicked or otherwise physically hurt by someone?: No     Within the past 12 months, have you been humiliated or emotionally abused in other ways by your partner or ex-partner?: No   Housing Stability: Unknown (8/19/2021)    Received from [facility]    Housing Stability     Housing Concerns: 0         --------------- PHYSICAL EXAM ---------------  Nursing notes and vitals independently reviewed by me.  VITALS:  Vitals:    11/21/24 1012 11/21/24 1015 11/21/24 1130 11/21/24 1140   BP: 101/61 122/83 112/74    Pulse: 81 85 75 63   Resp: 12 12 18 24   Temp:       TempSrc:       SpO2: 98% 99% 97% 96%   Weight:       Height:           PHYSICAL EXAM:    General:  alert, interactive, no distress  Eyes:  conjunctivae clear, conjugate gaze  HENT:  atraumatic, nose with no rhinorrhea, oropharynx clear  Neck:  no meningismus  Cardiovascular:  HR 80s during exam, regular rhythm, no murmurs, brisk cap refill  Chest:  no chest wall tenderness  Pulmonary:  no stridor, normal phonation, normal work of breathing, clear lungs bilaterally  Abdomen:  soft, nondistended, nontender  :  no CVA tenderness  Back:  no midline spinal tenderness  Musculoskeletal:  no pretibial edema, no calf tenderness. Gross ROM  intact to joints of extremities with no obvious deformities.  Skin:  warm, dry, no rash  Neuro:  awake, alert, answers questions appropriately, follows commands, moves all limbs  Psych:  mild/moderate anxious affect      --------------- RESULTS ---------------  EKG:    Reviewed and independently interpreted by me.  - NSR at 85bpm, no ST or T wave changes, normal intervals  - unchanged from prior on 1/20/11  My read.    LAB:  Reviewed and independently interpreted by me.  Results for orders placed or performed during the hospital encounter of 11/21/24   XR Chest 2 Views    Impression    IMPRESSION: Negative chest.   Result Value Ref Range    INR 0.94 0.85 - 1.15   D dimer quantitative   Result Value Ref Range    D-Dimer Quantitative 0.28 0.00 - 0.50 ug/mL FEU   Basic metabolic panel   Result Value Ref Range    Sodium 144 135 - 145 mmol/L    Potassium 3.7 3.4 - 5.3 mmol/L    Chloride 108 (H) 98 - 107 mmol/L    Carbon Dioxide (CO2) 24 22 - 29 mmol/L    Anion Gap 12 7 - 15 mmol/L    Urea Nitrogen 9.0 6.0 - 20.0 mg/dL    Creatinine 0.90 0.51 - 0.95 mg/dL    GFR Estimate 81 >60 mL/min/1.73m2    Calcium 8.5 (L) 8.8 - 10.4 mg/dL    Glucose 101 (H) 70 - 99 mg/dL   Hepatic function panel   Result Value Ref Range    Protein Total 7.1 6.4 - 8.3 g/dL    Albumin 4.5 3.5 - 5.2 g/dL    Bilirubin Total 0.3 <=1.2 mg/dL    Alkaline Phosphatase 80 40 - 150 U/L    AST 16 0 - 45 U/L    ALT 11 0 - 50 U/L    Bilirubin Direct <0.20 0.00 - 0.30 mg/dL   Result Value Ref Range    Lipase 42 13 - 60 U/L   Result Value Ref Range    Magnesium 2.2 1.7 - 2.3 mg/dL   TSH with free T4 reflex   Result Value Ref Range    TSH 8.82 (H) 0.30 - 4.20 uIU/mL   hCG Qualitative Pregnancy   Result Value Ref Range    hCG Serum Qualitative Negative Negative   CBC with platelets and differential   Result Value Ref Range    WBC Count 4.5 4.0 - 11.0 10e3/uL    RBC Count 4.52 3.80 - 5.20 10e6/uL    Hemoglobin 12.6 11.7 - 15.7 g/dL    Hematocrit 38.5 35.0 - 47.0 %     MCV 85 78 - 100 fL    MCH 27.9 26.5 - 33.0 pg    MCHC 32.7 31.5 - 36.5 g/dL    RDW 14.6 10.0 - 15.0 %    Platelet Count 272 150 - 450 10e3/uL    % Neutrophils 55 %    % Lymphocytes 35 %    % Monocytes 7 %    % Eosinophils 2 %    % Basophils 1 %    % Immature Granulocytes 0 %    NRBCs per 100 WBC 0 <1 /100    Absolute Neutrophils 2.5 1.6 - 8.3 10e3/uL    Absolute Lymphocytes 1.6 0.8 - 5.3 10e3/uL    Absolute Monocytes 0.3 0.0 - 1.3 10e3/uL    Absolute Eosinophils 0.1 0.0 - 0.7 10e3/uL    Absolute Basophils 0.0 0.0 - 0.2 10e3/uL    Absolute Immature Granulocytes 0.0 <=0.4 10e3/uL    Absolute NRBCs 0.0 10e3/uL   Result Value Ref Range    Free T4 1.01 0.90 - 1.70 ng/dL   Result Value Ref Range    Troponin T, High Sensitivity <6 <=14 ng/L         RADIOLOGY:  Reviewed and independently interpreted by me. Please see official radiology report.  Recent Results (from the past 24 hours)   XR Chest 2 Views    Narrative    EXAM: XR CHEST 2 VIEWS  LOCATION: [facility]  DATE: 11/21/2024    INDICATION: chest pain  COMPARISON: None.      Impression    IMPRESSION: Negative chest.         PROCEDURES:   Procedures   --------------------------------------------------------------------------------   Cardiac telemetry monitoring ordered by me secondary to the patient's history of  and to monitor the patient for dysrhythmia. Reviewed & independently interpreted by me. Revealed normal sinus rhythm.  --------------------------------------------------------------------------------                   --------------- ADDITIONAL MDM ---------------  MIPS:  Not Applicable    History:  - I considered systemic symptoms of the presenting illness.  - Supplemental history from:       -- patient, family (significant other)  - External Record(s) reviewed:       -- Inpatient/outpatient record (clinic visit 10/14/24), prior labs (blood 10/14/24), prior imaging (US breast biopsy 9/30/24)       -- see above ED course & MDM for  further details    Workup:  - Chart documentation above includes differential considered and my independent interpretation any EKGs, labs tests, and/or imaging  - emergent/severe conditions considered and evaluated for: see above differential & MDM  - medications given that require intensive monitoring for toxicity: IV opioids  - In additional to work up documented, I considered the following work up:       -- CTA chest/abdomen/pelvis       -- see above ED course & MDM for further details    External Consultation:  - Discussion of management with another provider:       -- ED pharmacist re: meds       -- see above charting for additional    Complicating Factors:  - Care impacted by chronic illness:       -- see above MDM, past medical history, & problem list    Disposition Considerations:  - Discharge       -- I considered escalation of care with admission to the hospital, but ultimately discharged the patient given reassuring workup       -- I recommended the patient continue their current prescription strength medication(s) as charted above in current medications list       -- I prescribed prescription strength medication(s) as charted above       -- I recommended over-the-counter medication(s) as charted above & in discharge instructions         Michel Luciano MD  11/21/24  Emergency Medicine  [facility] EMERGENCY DEPARTMENT  [address] [place] [id number]  [phone]  Dept: [phone]     Michel Luciano MD  11/21/24 4946

## 2024-11-21 NOTE — ED NOTES
Ambulated patient on unit O2 Sats were %.  Heart rate stayed in the 90'S.  Patient ambulated with no assistance.

## 2024-11-23 LAB
ATRIAL RATE - MUSE: 85 BPM
DIASTOLIC BLOOD PRESSURE - MUSE: NORMAL MMHG
INTERPRETATION ECG - MUSE: NORMAL
P AXIS - MUSE: 50 DEGREES
PR INTERVAL - MUSE: 152 MS
QRS DURATION - MUSE: 80 MS
QT - MUSE: 378 MS
QTC - MUSE: 449 MS
R AXIS - MUSE: 26 DEGREES
SYSTOLIC BLOOD PRESSURE - MUSE: NORMAL MMHG
T AXIS - MUSE: 36 DEGREES
VENTRICULAR RATE- MUSE: 85 BPM

## 2024-12-30 ENCOUNTER — DOCUMENTATION ONLY (OUTPATIENT)
Dept: FAMILY MEDICINE | Facility: CLINIC | Age: 44
End: 2024-12-30
Payer: OTHER GOVERNMENT

## 2024-12-30 DIAGNOSIS — E03.9 ACQUIRED HYPOTHYROIDISM: Primary | ICD-10-CM

## 2024-12-30 NOTE — PROGRESS NOTES
Viktoriya Lovelace has an upcoming lab appointment:    Future Appointments   Date Time Provider Department Center   12/31/2024  1:00 PM [facility]     Patient is scheduled for the following lab(s): Thyroid labs for you.    There is no order available. Please review and place either future orders or HMPO (Review of Health Maintenance Protocol Orders), as appropriate.    Health Maintenance Due   Topic    ANNUAL REVIEW OF HM ORDERS      Roz Botello

## 2024-12-31 ENCOUNTER — LAB (OUTPATIENT)
Dept: LAB | Facility: CLINIC | Age: 44
End: 2024-12-31
Payer: OTHER GOVERNMENT

## 2024-12-31 DIAGNOSIS — E03.9 ACQUIRED HYPOTHYROIDISM: ICD-10-CM

## 2024-12-31 LAB
T4 FREE SERPL-MCNC: 0.97 NG/DL (ref 0.9–1.7)
TSH SERPL DL<=0.005 MIU/L-ACNC: 7.5 UIU/ML (ref 0.3–4.2)
VIT D+METAB SERPL-MCNC: 19 NG/ML (ref 20–50)

## 2025-01-02 DIAGNOSIS — E03.9 ACQUIRED HYPOTHYROIDISM: ICD-10-CM

## 2025-01-02 RX ORDER — LEVOTHYROXINE SODIUM 50 UG/1
50 TABLET ORAL DAILY
Qty: 90 TABLET | Refills: 0 | Status: SHIPPED | OUTPATIENT
Start: 2025-01-02

## 2025-01-23 ENCOUNTER — OFFICE VISIT (OUTPATIENT)
Dept: FAMILY MEDICINE | Facility: CLINIC | Age: 45
End: 2025-01-23
Payer: COMMERCIAL

## 2025-01-23 VITALS
HEART RATE: 120 BPM | HEIGHT: 69 IN | SYSTOLIC BLOOD PRESSURE: 126 MMHG | WEIGHT: 174.6 LBS | OXYGEN SATURATION: 97 % | TEMPERATURE: 98 F | DIASTOLIC BLOOD PRESSURE: 82 MMHG | RESPIRATION RATE: 20 BRPM | BODY MASS INDEX: 25.86 KG/M2

## 2025-01-23 DIAGNOSIS — F41.1 GAD (GENERALIZED ANXIETY DISORDER): Primary | ICD-10-CM

## 2025-01-23 DIAGNOSIS — F32.1 CURRENT MODERATE EPISODE OF MAJOR DEPRESSIVE DISORDER WITHOUT PRIOR EPISODE (H): ICD-10-CM

## 2025-01-23 RX ORDER — VENLAFAXINE HYDROCHLORIDE 75 MG/1
75 TABLET, EXTENDED RELEASE ORAL DAILY
Qty: 90 TABLET | Refills: 0 | Status: SHIPPED | OUTPATIENT
Start: 2025-01-23

## 2025-01-23 RX ORDER — LORAZEPAM 0.5 MG/1
0.5 TABLET ORAL EVERY 6 HOURS PRN
Qty: 10 TABLET | Refills: 0 | Status: SHIPPED | OUTPATIENT
Start: 2025-01-23

## 2025-01-23 ASSESSMENT — ANXIETY QUESTIONNAIRES
2. NOT BEING ABLE TO STOP OR CONTROL WORRYING: NEARLY EVERY DAY
IF YOU CHECKED OFF ANY PROBLEMS ON THIS QUESTIONNAIRE, HOW DIFFICULT HAVE THESE PROBLEMS MADE IT FOR YOU TO DO YOUR WORK, TAKE CARE OF THINGS AT HOME, OR GET ALONG WITH OTHER PEOPLE: EXTREMELY DIFFICULT
GAD7 TOTAL SCORE: 17
7. FEELING AFRAID AS IF SOMETHING AWFUL MIGHT HAPPEN: NEARLY EVERY DAY
GAD7 TOTAL SCORE: 17
6. BECOMING EASILY ANNOYED OR IRRITABLE: MORE THAN HALF THE DAYS
3. WORRYING TOO MUCH ABOUT DIFFERENT THINGS: NEARLY EVERY DAY
4. TROUBLE RELAXING: NEARLY EVERY DAY
7. FEELING AFRAID AS IF SOMETHING AWFUL MIGHT HAPPEN: NEARLY EVERY DAY
GAD7 TOTAL SCORE: 17
5. BEING SO RESTLESS THAT IT IS HARD TO SIT STILL: NOT AT ALL
1. FEELING NERVOUS, ANXIOUS, OR ON EDGE: NEARLY EVERY DAY
8. IF YOU CHECKED OFF ANY PROBLEMS, HOW DIFFICULT HAVE THESE MADE IT FOR YOU TO DO YOUR WORK, TAKE CARE OF THINGS AT HOME, OR GET ALONG WITH OTHER PEOPLE?: EXTREMELY DIFFICULT

## 2025-01-23 ASSESSMENT — PATIENT HEALTH QUESTIONNAIRE - PHQ9
SUM OF ALL RESPONSES TO PHQ QUESTIONS 1-9: 21
SUM OF ALL RESPONSES TO PHQ QUESTIONS 1-9: 21
10. IF YOU CHECKED OFF ANY PROBLEMS, HOW DIFFICULT HAVE THESE PROBLEMS MADE IT FOR YOU TO DO YOUR WORK, TAKE CARE OF THINGS AT HOME, OR GET ALONG WITH OTHER PEOPLE: EXTREMELY DIFFICULT

## 2025-01-23 ASSESSMENT — PAIN SCALES - GENERAL: PAINLEVEL_OUTOF10: NO PAIN (0)
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
## 2025-02-19 ASSESSMENT — ANXIETY QUESTIONNAIRES
5. BEING SO RESTLESS THAT IT IS HARD TO SIT STILL: SEVERAL DAYS
IF YOU CHECKED OFF ANY PROBLEMS ON THIS QUESTIONNAIRE, HOW DIFFICULT HAVE THESE PROBLEMS MADE IT FOR YOU TO DO YOUR WORK, TAKE CARE OF THINGS AT HOME, OR GET ALONG WITH OTHER PEOPLE: VERY DIFFICULT
3. WORRYING TOO MUCH ABOUT DIFFERENT THINGS: MORE THAN HALF THE DAYS
4. TROUBLE RELAXING: SEVERAL DAYS
7. FEELING AFRAID AS IF SOMETHING AWFUL MIGHT HAPPEN: SEVERAL DAYS
7. FEELING AFRAID AS IF SOMETHING AWFUL MIGHT HAPPEN: SEVERAL DAYS
GAD7 TOTAL SCORE: 11
2. NOT BEING ABLE TO STOP OR CONTROL WORRYING: MORE THAN HALF THE DAYS
GAD7 TOTAL SCORE: 11
6. BECOMING EASILY ANNOYED OR IRRITABLE: SEVERAL DAYS
8. IF YOU CHECKED OFF ANY PROBLEMS, HOW DIFFICULT HAVE THESE MADE IT FOR YOU TO DO YOUR WORK, TAKE CARE OF THINGS AT HOME, OR GET ALONG WITH OTHER PEOPLE?: VERY DIFFICULT
GAD7 TOTAL SCORE: 11
1. FEELING NERVOUS, ANXIOUS, OR ON EDGE: NEARLY EVERY DAY

## 2025-02-20 ENCOUNTER — OFFICE VISIT (OUTPATIENT)
Dept: BEHAVIORAL HEALTH | Facility: CLINIC | Age: 45
End: 2025-02-20
Payer: COMMERCIAL

## 2025-02-20 DIAGNOSIS — F33.1 MODERATE EPISODE OF RECURRENT MAJOR DEPRESSIVE DISORDER (H): ICD-10-CM

## 2025-02-20 DIAGNOSIS — F41.1 GAD (GENERALIZED ANXIETY DISORDER): Primary | ICD-10-CM

## 2025-02-20 ASSESSMENT — COLUMBIA-SUICIDE SEVERITY RATING SCALE - C-SSRS
1. HAVE YOU WISHED YOU WERE DEAD OR WISHED YOU COULD GO TO SLEEP AND NOT WAKE UP?: YES
1. IN THE PAST MONTH, HAVE YOU WISHED YOU WERE DEAD OR WISHED YOU COULD GO TO SLEEP AND NOT WAKE UP?: YES
2. HAVE YOU ACTUALLY HAD ANY THOUGHTS OF KILLING YOURSELF?: NO

## 2025-02-20 NOTE — PROGRESS NOTES
[facility] Primary Care: Integrated Behavioral Health    Integrated Behavioral Health   Mental Health & Addiction Services      Progress Note - Initial [facility] Visit     Patient Name: Viktoriya Lovelace    Date: February 20, 2025  Service Type: Individual   Visit Start Time:  1028AM  Visit End Time:  10:57 AM   Attendees: Patient   Service Modality: In-person     [facility] Visit Activities (Refresh list every visit): NEW         DATA:     Interactive Complexity: No   Crisis: No     Assessments completed prior to this visit:     The following assessments were completed by patient for this visit:  PHQ9:       10/16/2023    12:29 PM 11/13/2023    10:00 AM 11/27/2023    10:00 AM 10/7/2024    12:05 PM 1/23/2025     9:33 AM 2/5/2025     9:53 AM 2/19/2025     1:31 PM   PHQ-9 SCORE   PHQ-9 Total Score MyChart 4 (Minimal depression) 4 (Minimal depression) 4 (Minimal depression) 11 (Moderate depression) 21 (Severe depression) 9 (Mild depression) 11 (Moderate depression)   PHQ-9 Total Score 4 4 4 11 21  9  11        Patient-reported     GAD7:       9/19/2023     1:27 PM 10/3/2023    12:23 PM 11/13/2023    10:00 AM 11/27/2023    10:01 AM 4/16/2024    10:08 AM 1/23/2025     9:33 AM 2/19/2025     1:32 PM   JM-7 SCORE   Total Score 16 (severe anxiety) 6 (mild anxiety) 5 (mild anxiety) 6 (mild anxiety) 14 (moderate anxiety) 17 (severe anxiety) 11 (moderate anxiety)   Total Score 16 6 5 6 14 17  11        Patient-reported     CAGE-AID:       6/27/2023    10:18 AM   CAGE-AID Total Score   Total Score 0   Total Score MyChart 0 (A total score of 2 or greater is considered clinically significant)     PROMIS 10-Global Health (all questions and answers displayed):       6/27/2023    10:18 AM 7/7/2023     2:18 PM 8/21/2023    10:58 AM 11/13/2023    10:01 AM   PROMIS 10   In general, would you say your health is: Good Good Good Good   In general, would you say your quality of life is: Good Good Fair Good   In general, how  would you rate your physical health? Good Good Fair Good   In general, how would you rate your mental health, including your mood and your ability to think? Poor Poor Poor Good   In general, how would you rate your satisfaction with your social activities and relationships? Good Fair Poor Good   In general, please rate how well you carry out your usual social activities and roles Good Fair Very good Very good   To what extent are you able to carry out your everyday physical activities such as walking, climbing stairs, carrying groceries, or moving a chair? Completely Mostly A little Completely   In the past 7 days, how often have you been bothered by emotional problems such as feeling anxious, depressed, or irritable? Often Often Often Sometimes   In the past 7 days, how would you rate your fatigue on average? Moderate Mild Mild Mild   In the past 7 days, how would you rate your pain on average, where 0 means no pain, and 10 means worst imaginable pain? 3 1 3 2   In general, would you say your health is: 3 3 3 3   In general, would you say your quality of life is: 3 3 2 3   In general, how would you rate your physical health? 3 3 2 3   In general, how would you rate your mental health, including your mood and your ability to think? 1 1 1 3   In general, how would you rate your satisfaction with your social activities and relationships? 3 2 1 3   In general, please rate how well you carry out your usual social activities and roles. (This includes activities at home, at work and in your community, and responsibilities as a parent, child, spouse, employee, friend, etc.) 3 2 4 4   To what extent are you able to carry out your everyday physical activities such as walking, climbing stairs, carrying groceries, or moving a chair? 5 4 2 5   In the past 7 days, how often have you been bothered by emotional problems such as feeling anxious, depressed, or irritable? 4 4 4 3   In the past 7 days, how would you rate your fatigue  "on average? 3 2 2 2   In the past 7 days, how would you rate your pain on average, where 0 means no pain, and 10 means worst imaginable pain? 3 1 3 2   Global Mental Health Score 9 8 6 12   Global Physical Health Score 15 15 12 16   PROMIS TOTAL - SUBSCORES 24 23 18 28     [place] Suicide Severity Rating Scale (Lifetime/Recent)      6/27/2023    11:14 AM 7/7/2023     2:00 PM 4/10/2024     7:58 AM 11/21/2024     8:18 AM   [place] Suicide Severity Rating (Lifetime/Recent)   Q1 Wished to be Dead (Past Month)   0-->no 0-->no   Q2 Suicidal Thoughts (Past Month)   0-->no 0-->no   Q6 Suicide Behavior (Lifetime)   0-->no 0-->no   Level of Risk per Screen   no risks indicated no risks indicated   1. Wish to be Dead (Lifetime) Y Y     Wish to be Dead Description (Lifetime)  \"I don't wan't to be alive anymore\".     1. Wish to be Dead (Past 1 Month) Y Y     Wish to be Dead Description (Past 1 Month)  \"I don't wan't to be alive anymore\".     2. Non-Specific Active Suicidal Thoughts (Lifetime) Y N     2. Non-Specific Active Suicidal Thoughts (Past 1 Month) Y Y     Non-Specific Active Suicidal Thought Description (Past 1 Month) Thoughts of taking pills to overdose. \"I don't want to be alive\"     3. Active Suicidal Ideation with any Methods (Not Plan) Without Intent to Act (Lifetime) Y N     3. Active Suicidal Ideation with any Methods (Not Plan) Without Intent to Act (Past 1 Month) Y Y     Active Suicidal Ideation with any Methods (Not Plan) Description (Past 1 Month) Thinking about overdosing with taking Celexa, but took off Celexa and now doing better. \"I don't wan't to be alive anymore\".     4. Active Suicidal Ideation with Some Intent to Act, Without Specific Plan (Past 1 Month) Y Y     Active Suicidal Ideation with Some Intent to Act, Without Specific Plan Description (Past 1 Month)  \"I had thoughts about taking my celexa meds and not having to deal with it anymore\".     5. Active Suicidal Ideation with Specific Plan and " "Intent (Lifetime) Y Y     Active Suicidal Ideation with Specific Plan and Intent Description (Lifetime)  \"I had thoughts about taking my celexa meds and not having to deal with it anymore\".     5. Active Suicidal Ideation with Specific Plan and Intent (Past 1 Month) Y Y     Active Suicidal Ideation with Specific Plan and Intent Description (Past 1 Month)  \"I had thoughts about taking my celexa meds and not having to deal with it anymore\".     Most Severe Ideation Rating (Lifetime) 5 2     Description of Most Severe Ideation (Lifetime)  \"I had thoughts about taking my celexa meds and not having to deal with it anymore\".     Most Severe Ideation Rating (Past 1 Month)  2     Description of Most Severe Ideation (Past 1 Month)  \"I had thoughts about taking my celexa meds and not having to deal with it anymore\".     Frequency (Lifetime) 5 1     Frequency (Past 1 Month) 5 1     Duration (Lifetime) 5 1     Duration (Past 1 Month) 5 1     Controllability (Lifetime) 4 2     Controllability (Past 1 Month) 4 2     Deterrents (Lifetime) 1 1     Deterrents (Past 1 Month) 1 1     Reasons for Ideation (Lifetime) 5 5     Reasons for Ideation (Past 1 Month) 5 5     Actual Attempt (Lifetime) N N     Has subject engaged in non-suicidal self-injurious behavior? (Lifetime) N N     Interrupted Attempts (Lifetime) N N     Aborted or Self-Interrupted Attempt (Lifetime) N N     Preparatory Acts or Behavior (Lifetime) N N     Calculated C-SSRS Risk Score (Lifetime/Recent) High Risk High Risk          Referral:   Patient was referred to [facility] by primary care provider and Lucie HASSAN CNP .    Reason for referral: clarify behavioral health diagnosis and determine behavioral health treatment options.      [facility] introduced self and role. Discussed informed consent and limits to confidentiality.     Presenting Concerns/ Current Stressors:   Patient reported depression and anxiety causing impairments. Patient reports anxiety and depression related " to soon to be ex .     Ex  is an alcoholic, still has contact and in the process of attempting to settle divorce.     Relationship ended in December.     Fatigue related to thyroid    Started taking effexor and loraxepam, hydroxyzine within the past month.     Pt reported feeling improvements in mood since starting     Therapeutic Interventions:  Motivational Interviewing (MI): Validated patient's thoughts, feelings and experience. Expressed respect for patient's autonomy in decision making.     Response to treatment interventions:   Patient was receptive to interventions utilized.  Patient was engaged in the therapy process.      Safety Issues and Plan for Safety and Risk Management:     Patient has had a history of suicidal ideation: passive ideation no current plan    Patient denies current fears or concerns for personal safety.   Patient denies current or recent suicidal ideation or behaviors.   Patient denies current or recent homicidal ideation or behaviors.   Patient denies current or recent self injurious behavior or ideation.   Patient denies other safety concerns.   Recommended that patient call 911 or go to the local ED should there be a change in any of these risk factors   Patient reports there are no firearms in the house.       ASSESSMENT:   Mental Status:     Appearance:   Appropriate    Eye Contact:   Good    Psychomotor Behavior: Normal    Attitude:   Cooperative    Orientation:   All   Speech Rate / Production: Normal/ Responsive   Volume:   Normal    Mood:    Normal   Affect:    Appropriate    Thought Content:  Clear    Thought Form:  Coherent  Goal Directed    Insight:    Good         Diagnostic Criteria:   Generalized Anxiety Disorder  A. Excessive anxiety and worry about a number of events or activities (such as work or school performance).   B. The person finds it difficult to control the worry.  C. Select 3 or more symptoms (required for diagnosis). Only one item is required in  children.   - Restlessness or feeling keyed up or on edge.    - Being easily fatigued.    - Difficulty concentrating or mind going blank.    - Irritability.    - Sleep disturbance (difficulty falling or staying asleep, or restless unsatisfying sleep).   D. The focus of the anxiety and worry is not confined to features of an Axis I disorder.  E. The anxiety, worry, or physical symptoms cause clinically significant distress or impairment in social, occupational, or other important areas of functioning.   F. The disturbance is not due to the direct physiological effects of a substance (e.g., a drug of abuse, a medication) or a general medical condition (e.g., hyperthyroidism) and does not occur exclusively during a Mood Disorder, a Psychotic Disorder, or a Pervasive Developmental Disorder.    - The aformentioned symptoms began 2 month(s) ago and occurs 7 days per week and is experienced as moderate.  Major Depressive Disorder  CRITERIA (A-C) REPRESENT A MAJOR DEPRESSIVE EPISODE - SELECT THESE CRITERIA  A) Recurrent episode(s) - symptoms have been present during the same 2-week period and represent a change from previous functioning 5 or more symptoms (required for diagnosis)   - Depressed mood. Note: In children and adolescents, can be irritable mood.     - Diminished interest or pleasure in all, or almost all, activities.    - Significant weight loss when not dieting decrease in appetite.    - Decreased sleep.    - Fatigue or loss of energy.    - Feelings of worthlessness or inappropriate and excessive guilt.    - Recurrent thoughts of death (not just fear of dying), recurrent suicidal ideation without a specific plan, or a suicide attempt or a specific plan for committing suicide.   B) The symptoms cause clinically significant distress or impairment in social, occupational, or other important areas of functioning  C) The episode is not attributable to the physiological effects of a substance or to another medical  condition  D) The occurence of major depressive episode is not better explained by other thought / psychotic disorders  E) There has never been a manic episode or hypomanic episode        DSM5 Diagnoses: (Sustained by DSM5 Criteria Listed Above)     Diagnoses: 296.32 (F33.1) Major Depressive Disorder, Recurrent Episode, Moderate _  300.02 (F41.1) Generalized Anxiety Disorder     Psychosocial / Contextual Factors: Trauma History, Relationship Concerns, Occupational Issues, Interpersonal Concerns, and Grief/Loss       Collateral Reports Completed:   Routed note to PCP        PLAN: (Homework, other):     1. Patient was provided:  recommendation to schedule follow-up with [facility] recommendation to follow through on referrals     2. Provider recommended the following referrals: Individual therapy [facility] or in the community.        3. Suicide Risk and Safety Concerns were assessed for Viktoriya Lovelace    Safety Plan:   Patient declined safety plan, Pt reported being able to call crisis lines and friends       Colt Morris LMFT, BHC   February 20, 2025    Answers submitted by the patient for this visit:  Patient Health Questionnaire (Submitted on 2/19/2025)  If you checked off any problems, how difficult have these problems made it for you to do your work, take care of things at home, or get along with other people?: Very difficult  PHQ9 TOTAL SCORE: 11  Patient Health Questionnaire (G7) (Submitted on 2/19/2025)  JM 7 TOTAL SCORE: 11

## 2025-02-23 ASSESSMENT — PATIENT HEALTH QUESTIONNAIRE - PHQ9
10. IF YOU CHECKED OFF ANY PROBLEMS, HOW DIFFICULT HAVE THESE PROBLEMS MADE IT FOR YOU TO DO YOUR WORK, TAKE CARE OF THINGS AT HOME, OR GET ALONG WITH OTHER PEOPLE: SOMEWHAT DIFFICULT
SUM OF ALL RESPONSES TO PHQ QUESTIONS 1-9: 6
SUM OF ALL RESPONSES TO PHQ QUESTIONS 1-9: 6

## 2025-02-24 ENCOUNTER — OFFICE VISIT (OUTPATIENT)
Dept: FAMILY MEDICINE | Facility: CLINIC | Age: 45
End: 2025-02-24
Payer: COMMERCIAL

## 2025-02-24 VITALS
OXYGEN SATURATION: 98 % | RESPIRATION RATE: 18 BRPM | BODY MASS INDEX: 24.63 KG/M2 | WEIGHT: 167.8 LBS | SYSTOLIC BLOOD PRESSURE: 114 MMHG | HEART RATE: 105 BPM | TEMPERATURE: 98.3 F | DIASTOLIC BLOOD PRESSURE: 78 MMHG

## 2025-02-24 DIAGNOSIS — E03.9 ACQUIRED HYPOTHYROIDISM: ICD-10-CM

## 2025-02-24 DIAGNOSIS — F32.1 CURRENT MODERATE EPISODE OF MAJOR DEPRESSIVE DISORDER WITHOUT PRIOR EPISODE (H): Primary | ICD-10-CM

## 2025-02-24 PROCEDURE — 99214 OFFICE O/P EST MOD 30 MIN: CPT

## 2025-02-24 PROCEDURE — 84443 ASSAY THYROID STIM HORMONE: CPT

## 2025-02-24 PROCEDURE — 36415 COLL VENOUS BLD VENIPUNCTURE: CPT

## 2025-02-24 PROCEDURE — G2211 COMPLEX E/M VISIT ADD ON: HCPCS

## 2025-02-24 RX ORDER — VENLAFAXINE HYDROCHLORIDE 150 MG/1
150 CAPSULE, EXTENDED RELEASE ORAL DAILY
Qty: 30 CAPSULE | Refills: 0 | Status: SHIPPED | OUTPATIENT
Start: 2025-02-24

## 2025-02-24 ASSESSMENT — PAIN SCALES - GENERAL: PAINLEVEL_OUTOF10: NO PAIN (0)

## 2025-02-24 NOTE — PROGRESS NOTES
"  Assessment & Plan     Current moderate episode of major depressive disorder without prior episode (H)  PHQ score decreased from 11 to 6 since administration of venlafaxine 75 mg daily.  Patient states her mental health has improved.  Through shared decision making, will increase venlafaxine to 150 mg daily.  Reviewed potential side effects with increased dosage.  Patient will follow-up regarding medication management.  - venlafaxine (EFFEXOR XR) 150 MG 24 hr capsule; Take 1 capsule (150 mg) by mouth daily.    Acquired hypothyroidism  12/31/2024 TSH 7.5, T4 free 0.97.  Levothyroxine was increased to 50 mcg daily.  She was advised by her primary care provider to have labs rechecked in 8 weeks.  Will refill prescription according to TSH results.   - TSH with free T4 reflex    The longitudinal plan of care for the diagnosis(es)/condition(s) as documented were addressed during this visit. Due to the added complexity in care, I will continue to support Dulce in the subsequent management and with ongoing continuity of care.    BMI  Estimated body mass index is 24.63 kg/m  as calculated from the following:    Height as of 1/23/25: 1.758 m (5' 9.21\").    Weight as of this encounter: 76.1 kg (167 lb 12.8 oz).     Depression Screening Follow Up        2/23/2025    10:37 PM   PHQ   PHQ-9 Total Score 6    Q9: Thoughts of better off dead/self-harm past 2 weeks Several days   F/U: Thoughts of suicide or self-harm Yes   F/U: Self harm-plan No   F/U: Self-harm action No   F/U: Safety concerns No       Patient-reported         2/23/2025    10:37 PM   Last PHQ-9   1.  Little interest or pleasure in doing things 1   2.  Feeling down, depressed, or hopeless 0   3.  Trouble falling or staying asleep, or sleeping too much 1   4.  Feeling tired or having little energy 1   5.  Poor appetite or overeating 2   6.  Feeling bad about yourself 0   7.  Trouble concentrating 0   8.  Moving slowly or restless 0   Q9: Thoughts of better off " dead/self-harm past 2 weeks 1   PHQ-9 Total Score 6    In the past two weeks have you had thoughts of suicide or self harm? Yes   Do you have concerns about your personal safety or the safety of others? No   In the past 2 weeks have you thought about a plan or had intention to harm yourself? No   In the past 2 weeks have you acted on these thoughts in any way? No       Patient-reported         Follow Up Actions Taken  Crisis resource information provided in the After Visit Summary  Patient to follow up with PCP.  Clinic staff to schedule appointment if able.    Discussed the following ways the patient can remain in a safe environment:  be around others    Subjective   Dulce is a 44 year old, presenting for the following health issues:  Follow Up      2/24/2025    10:42 AM   Additional Questions   Roomed by Jacqueline CLARK CMA     History of Present Illness       Mental Health Follow-up:  Patient presents to follow-up on Depression & Anxiety.Patient's depression since last visit has been:  Better  The patient is not having other symptoms associated with depression.  Patient's anxiety since last visit has been:  Better  The patient is not having other symptoms associated with anxiety.  Any significant life events: relationship concerns and financial concerns  Patient is not feeling anxious or having panic attacks.  Patient has no concerns about alcohol or drug use.    She eats 0-1 servings of fruits and vegetables daily.She consumes 2 sweetened beverage(s) daily.She exercises with enough effort to increase her heart rate 9 or less minutes per day.  She exercises with enough effort to increase her heart rate 3 or less days per week.   She is taking medications regularly.         Patient is a 44-year-old female presenting for mental health follow-up and thyroid labs.   Patient declined annual physical at .  Medical history includes depression, anxiety, PMDD, migraines, hypothyroidism, GERD.    Mental health has been  worsening with ongoing divorce and recent break-up with girlfriend.  1/23/2025 started patient on venlafaxine 75 mg daily.  Reports side effects to Zoloft, citalopram, Lexapro.  Reports improved mental health for the past 2 weeks.  Patient seems like she is in good spirits today.  She was tearful at previous visit.  Her appetite has returned.  She has been able to get out of the house.  Has been spending time with family and friends.  Has not been isolating self at home.  She has not noted any side effects since medication administration.  Does have a history of PMDD, 1 week prior to menstrual cycle anxiety will worsen.  Currently experiencing PMDD symptoms.  She is not interested in hormonal treatment.  At previous visit filled out [place] paperwork dates 1/16/2025 to 3/16/2025.  Patient needs a letter stating she is cleared to return back to work mid-March.  Advised patient to follow-up with either myself or primary care provider for this.  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          2/5/2025     9:53 AM 2/19/2025     1:31 PM 2/23/2025    10:37 PM   PHQ   PHQ-9 Total Score 9  11  6    Q9: Thoughts of better off dead/self-harm past 2 weeks Not at all Several days Several days   F/U: Thoughts of suicide or self-harm  Yes Yes   F/U: Self harm-plan  No No   F/U: Self-harm action  No No   F/U: Safety concerns  No No       Patient-reported     Review of Systems  Review of systems negative otherwise known HPI.    No past medical history on file.    Past Surgical History:   Procedure Laterality Date    OTHER SURGICAL HISTORY      Cosmetic Ear Surgery    TUBAL LIGATION         Family History   Problem Relation Age of Onset    Melanoma Mother         on face    Diabetes Type 2  Father     Coronary Artery Disease Father     Acute Myocardial Infarction Father        Social History     Tobacco Use    Smoking status: Former     Current packs/day: 0.00     Average packs/day: 0.8 packs/day for 16.0 years (12.0 ttl pk-yrs)     Types:  Cigarettes     Start date:      Quit date: 2012     Years since quittin.1    Smokeless tobacco: Never   Substance Use Topics    Alcohol use: No     Current Outpatient Medications   Medication Sig Dispense Refill    famotidine (PEPCID) 20 MG tablet Take 1 tablet (20 mg) by mouth 2 times daily. 30 tablet 0    hydrOXYzine HCl (ATARAX) 25 MG tablet Take 1 tablet (25 mg) by mouth 3 times daily as needed for anxiety 90 tablet 3    levothyroxine (SYNTHROID/LEVOTHROID) 50 MCG tablet Take 1 tablet (50 mcg) by mouth daily. 90 tablet 0    LORazepam (ATIVAN) 0.5 MG tablet Take 1 tablet (0.5 mg) by mouth every 6 hours as needed for anxiety. 10 tablet 0    sucralfate (CARAFATE) 1 GM/10ML suspension Take 10 mLs (1 g) by mouth 4 times daily. 414 mL 2    venlafaxine (EFFEXOR-ER) 75 MG 24 hr tablet Take 1 tablet (75 mg) by mouth daily. 90 tablet 0     No current facility-administered medications for this visit.       Objective    /78 (BP Location: Right arm, Patient Position: Sitting, Cuff Size: Adult Regular)   Pulse 105   Temp 98.3  F (36.8  C) (Temporal)   Resp 18   Wt 76.1 kg (167 lb 12.8 oz)   LMP 2025 (Approximate)   SpO2 98%   BMI 24.63 kg/m    Body mass index is 24.63 kg/m .  Physical Exam   General: Alert, oriented, no acute distress.    Respiratory: Easy work of breathing.   Cardiovascular: Appears warm and well-perfused.    Skin: No abnormalities noted on visualized skin.   Neurologic: Mentation intact and speech normal.     Psychiatric: Appropriate affect.     Signed Electronically by: SONYA Carrington CNP

## 2025-02-25 LAB — TSH SERPL DL<=0.005 MIU/L-ACNC: 3.16 UIU/ML (ref 0.3–4.2)

## 2025-02-27 ENCOUNTER — OFFICE VISIT (OUTPATIENT)
Dept: BEHAVIORAL HEALTH | Facility: CLINIC | Age: 45
End: 2025-02-27
Payer: COMMERCIAL

## 2025-02-27 DIAGNOSIS — F33.41 RECURRENT MAJOR DEPRESSIVE DISORDER, IN PARTIAL REMISSION: Primary | ICD-10-CM

## 2025-02-27 NOTE — PROGRESS NOTES
[facility]: Integrated Behavioral Health     Integrated Behavioral Health Services   Mental Health and Addiction Services     Brief Diagnostic Assessment        PATIENT'S NAME: Viktoriya Lovelace  MRN: [id number]     : 1980     DATE OF SERVICE: 2025  SERVICE LOCATION: Face to Face in Clinic   SERVICE MODALITY: In-person  Visit Start Time: 134PM  Visit End Time:  200PM    VISIT NUMBER: 2  [facility] Visit Activities: [facility] Only     Assessments completed prior to visit:     The following assessments were completed by patient for this visit:  PHQ9:       2023    10:00 AM 2023    10:00 AM 10/7/2024    12:05 PM 2025     9:33 AM 2025     9:53 AM 2025     1:31 PM 2025    10:37 PM   PHQ-9 SCORE   PHQ-9 Total Score MyChart 4 (Minimal depression) 4 (Minimal depression) 11 (Moderate depression) 21 (Severe depression) 9 (Mild depression) 11 (Moderate depression) 6 (Mild depression)   PHQ-9 Total Score 4 4 11 21  9  11  6        Patient-reported     GAD7:       2023     1:27 PM 10/3/2023    12:23 PM 2023    10:00 AM 2023    10:01 AM 2024    10:08 AM 2025     9:33 AM 2025     1:32 PM   JM-7 SCORE   Total Score 16 (severe anxiety) 6 (mild anxiety) 5 (mild anxiety) 6 (mild anxiety) 14 (moderate anxiety) 17 (severe anxiety) 11 (moderate anxiety)   Total Score 16 6 5 6 14 17  11        Patient-reported     CAGE-AID:       2023    10:18 AM   CAGE-AID Total Score   Total Score 0   Total Score MyChart 0 (A total score of 2 or greater is considered clinically significant)     [place] Suicide Severity Rating Scale (Lifetime/Recent)      2023    11:14 AM 2023     2:00 PM 4/10/2024     7:58 AM 2024     8:18 AM 2025    10:31 AM   [place] Suicide Severity Rating (Lifetime/Recent)   Q1 Wished to be Dead (Past Month)   0-->no 0-->no    Q2 Suicidal Thoughts (Past Month)   0-->no 0-->no    Q6 Suicide  "Behavior (Lifetime)   0-->no 0-->no    Level of Risk per Screen   no risks indicated no risks indicated    1. Wish to be Dead (Lifetime) Y Y   Y   Wish to be Dead Description (Lifetime)  \"I don't wan't to be alive anymore\".      1. Wish to be Dead (Past 1 Month) Y Y   Y   Wish to be Dead Description (Past 1 Month)  \"I don't wan't to be alive anymore\".      2. Non-Specific Active Suicidal Thoughts (Lifetime) Y N   N   2. Non-Specific Active Suicidal Thoughts (Past 1 Month) Y Y      Non-Specific Active Suicidal Thought Description (Past 1 Month) Thoughts of taking pills to overdose. \"I don't want to be alive\"      3. Active Suicidal Ideation with any Methods (Not Plan) Without Intent to Act (Lifetime) Y N      3. Active Suicidal Ideation with any Methods (Not Plan) Without Intent to Act (Past 1 Month) Y Y      Active Suicidal Ideation with any Methods (Not Plan) Description (Past 1 Month) Thinking about overdosing with taking Celexa, but took off Celexa and now doing better. \"I don't wan't to be alive anymore\".      4. Active Suicidal Ideation with Some Intent to Act, Without Specific Plan (Past 1 Month) Y Y      Active Suicidal Ideation with Some Intent to Act, Without Specific Plan Description (Past 1 Month)  \"I had thoughts about taking my celexa meds and not having to deal with it anymore\".      5. Active Suicidal Ideation with Specific Plan and Intent (Lifetime) Y Y      Active Suicidal Ideation with Specific Plan and Intent Description (Lifetime)  \"I had thoughts about taking my celexa meds and not having to deal with it anymore\".      5. Active Suicidal Ideation with Specific Plan and Intent (Past 1 Month) Y Y      Active Suicidal Ideation with Specific Plan and Intent Description (Past 1 Month)  \"I had thoughts about taking my celexa meds and not having to deal with it anymore\".      Most Severe Ideation Rating (Lifetime) 5 2      Description of Most Severe Ideation (Lifetime)  \"I had thoughts about taking my " "celexa meds and not having to deal with it anymore\".      Most Severe Ideation Rating (Past 1 Month)  2      Description of Most Severe Ideation (Past 1 Month)  \"I had thoughts about taking my celexa meds and not having to deal with it anymore\".      Frequency (Lifetime) 5 1      Frequency (Past 1 Month) 5 1      Duration (Lifetime) 5 1      Duration (Past 1 Month) 5 1      Controllability (Lifetime) 4 2      Controllability (Past 1 Month) 4 2      Deterrents (Lifetime) 1 1      Deterrents (Past 1 Month) 1 1      Reasons for Ideation (Lifetime) 5 5      Reasons for Ideation (Past 1 Month) 5 5      Actual Attempt (Lifetime) N N      Has subject engaged in non-suicidal self-injurious behavior? (Lifetime) N N   N   Interrupted Attempts (Lifetime) N N      Aborted or Self-Interrupted Attempt (Lifetime) N N      Preparatory Acts or Behavior (Lifetime) N N      Calculated C-SSRS Risk Score (Lifetime/Recent) High Risk High Risk   Low Risk        Identifying Information:     Patient is a 44 year old female,     ,   adult.  Patient attended the session alone.         Referral:   Who referred you to care: self,  .    Reason for referral: clarify behavioral health diagnosis and determine behavioral health treatment options.        Patient's Statement of Presenting Concern:     Patient reports the following reason(s) for seeking an assessment at this time: Depressikn and anxiety .  Patient stated that symptoms have resulted in the following functional impairments: health maintenance, self-care, and work / vocational responsibilities     History of Presenting Concern:     Patient reports that these problem(s) began    6 months ago. Patient has attempted to resolve these concerns in the past through: medication(s) from physician / PCP and physician / PCP. Patient reports that other professional(s) are involved in providing support / services. PCP     Social/Family History:     The patient describes their " cultural background as ,      Cultural influences and impact on patient's life structure, values, norms, and healthcare: Grewup in a Sikh home .      Contextual influences on patient's health include: Individual Factors patient is going through divorce, has experienced medical conditions such as thyroidism, increased anxiety and difficulty managing depressive episodes. Patient has moved states multiple times impacting ability to obtain licensure for work .      Cultural, Contextual, and socioeconomic factors do not affect the patient's access to services.  These factors will be addressed in the Preliminary Treatment plan.    Patient identified their preferred language to be English,  English. Patient reported they do not  need the assistance of an  or other support involved in therapy.      Current living situation: staying with someone,My sister and nephews, , , ,yes,Brian Vigil,19,Son,yes       Socioeconomic status and needs: does have financial concerns, but they do not wish to have them addressed.     Patient's current significant relationships include: parents; siblings; friends,      Patient's sexual orientation is palomo,     Patient reported having 2  children.      Patient identified extensive stable and meaningful social connections.      Patient identified the following strengths or resources that will help her     Highest education level was graduate school,  .      Patient is currently on medical leave .     Patient reported that they have  been involved with the legal system. Do you have a probation office? Patient does not        Medical History:    Family History of Mental Health: Yes: youngest sister diagnosed with anxiety    Current Medical Health Concerns: None reported    Current Medication:    Patient reports current meds as:   Current Outpatient Medications   Medication Sig Dispense Refill    famotidine (PEPCID) 20 MG tablet Take 1 tablet (20 mg) by mouth 2 times  daily. 30 tablet 0    hydrOXYzine HCl (ATARAX) 25 MG tablet Take 1 tablet (25 mg) by mouth 3 times daily as needed for anxiety 90 tablet 3    levothyroxine (SYNTHROID/LEVOTHROID) 50 MCG tablet Take 1 tablet (50 mcg) by mouth daily. 90 tablet 0    LORazepam (ATIVAN) 0.5 MG tablet Take 1 tablet (0.5 mg) by mouth every 6 hours as needed for anxiety. 10 tablet 0    sucralfate (CARAFATE) 1 GM/10ML suspension Take 10 mLs (1 g) by mouth 4 times daily. 414 mL 2    venlafaxine (EFFEXOR XR) 150 MG 24 hr capsule Take 1 capsule (150 mg) by mouth daily. 30 capsule 0     No current facility-administered medications for this visit.        Medication Adherence:     Patient reports taking/not taking prescription medications as prescribed: taking .     Substance Use:      Patient denies using alcohol.   Patient denies using tobacco  Patient denies using cannabis.   Patient denies using caffeine.   Patient reports using/abusing the following substance(s). Patient denies any history of substance use.      Substance Use: No symptoms     Based on the negative CAGE score and clinical interview there  are not indications of drug or alcohol abuse.        Significant Losses / Trauma / Abuse / Neglect Issues:     There are indications or report of significant loss, trauma, abuse or neglect issues related to: divorce and divorce prorcess .   Issues of possible neglect are not present.           Mental Status Assessment:     Appearance:   Appropriate    Eye Contact:   Good    Psychomotor Behavior: Normal    Attitude:   Cooperative    Orientation:   All   Speech Rate / Production: Normal    Volume:   Normal    Mood:    Normal   Affect:    Appropriate    Thought Content:  Clear    Thought Form:  Coherent  Logical    Insight:    Good          Safety Assessment:     Patient has had a history of suicidal ideation: passive SI    Patient denies current or recent suicidal ideation or behaviors.   Patient denies current or recent homicidal ideation or  behaviors.   Patient denies current or recent self injurious behavior or ideation.   Patient denies other safety concerns.   Patient reports there are/are not firearms in the house  are not;     Protective Factors dedication to family or friends; help seeking behaviors when distressed; living with other people;   Sense of responsibility to family, Life Satisfaction, Reality testing ability, Positive coping skills, Positive problem-solving skills, Positive social support, and Positive therapeutic releationships    Risk Factors Abrupt changes in clinical condition    Plan for Safety and Risk Management:     Safety and Risk:  Patient declined need for safety plan, has crisis resources if needed .          Report to child / adult protection services was NA.        Diagnostic Criteria:     Major Depressive Disorder  CRITERIA (A-C) REPRESENT A MAJOR DEPRESSIVE EPISODE - SELECT THESE CRITERIA  A) Recurrent episode(s) - symptoms have been present during the same 2-week period and represent a change from previous functioning 5 or more symptoms (required for diagnosis)   - Significant weight loss when not dieting decrease in appetite.   B) The symptoms cause clinically significant distress or impairment in social, occupational, or other important areas of functioning  C) The episode is not attributable to the physiological effects of a substance or to another medical condition  D) The occurence of major depressive episode is not better explained by other thought / psychotic disorders  E) There has never been a manic episode or hypomanic episode     DSM5 Diagnoses:      Diagnoses: 296.35 (F33.41)  Major Depressive Disorder, Recurrent Episode, In partial remission _  300.02 (F41.1) Generalized Anxiety Disorder   Psychosocial / Contextual Factors: Trauma History, Occupational Issues, and Financial Strain       Preliminary Treatment Plan:     It is expected that patient will need less than 10 psychotherapy sessions in the next 12  months, as they have received less than 10 in the previous year.     Chemical dependency recommendations: No indications of CD issues     As a preliminary treatment goal, patient will experience a reduction in depressed mood, will develop more effective coping skills to manage depressive symptoms, will develop healthy cognitive patterns and beliefs, will increase ability to function adaptively, and will continue to take medications as prescribed / participate in supportive activities and services .     Collaboration with other professionals is not indicated at this time.     The following referral(s) will be initiated: [place] [facility] [place]     A Release of Information is not needed at this time.             Colt Morris LMFT, [facility]   February 27, 2025

## 2025-03-12 ASSESSMENT — ANXIETY QUESTIONNAIRES
4. TROUBLE RELAXING: SEVERAL DAYS
3. WORRYING TOO MUCH ABOUT DIFFERENT THINGS: NOT AT ALL
2. NOT BEING ABLE TO STOP OR CONTROL WORRYING: NOT AT ALL
8. IF YOU CHECKED OFF ANY PROBLEMS, HOW DIFFICULT HAVE THESE MADE IT FOR YOU TO DO YOUR WORK, TAKE CARE OF THINGS AT HOME, OR GET ALONG WITH OTHER PEOPLE?: SOMEWHAT DIFFICULT
7. FEELING AFRAID AS IF SOMETHING AWFUL MIGHT HAPPEN: NOT AT ALL
GAD7 TOTAL SCORE: 3
5. BEING SO RESTLESS THAT IT IS HARD TO SIT STILL: NOT AT ALL
IF YOU CHECKED OFF ANY PROBLEMS ON THIS QUESTIONNAIRE, HOW DIFFICULT HAVE THESE PROBLEMS MADE IT FOR YOU TO DO YOUR WORK, TAKE CARE OF THINGS AT HOME, OR GET ALONG WITH OTHER PEOPLE: SOMEWHAT DIFFICULT
6. BECOMING EASILY ANNOYED OR IRRITABLE: SEVERAL DAYS
GAD7 TOTAL SCORE: 3
GAD7 TOTAL SCORE: 3
7. FEELING AFRAID AS IF SOMETHING AWFUL MIGHT HAPPEN: NOT AT ALL
1. FEELING NERVOUS, ANXIOUS, OR ON EDGE: SEVERAL DAYS

## 2025-03-12 ASSESSMENT — PATIENT HEALTH QUESTIONNAIRE - PHQ9
SUM OF ALL RESPONSES TO PHQ QUESTIONS 1-9: 4
SUM OF ALL RESPONSES TO PHQ QUESTIONS 1-9: 4
10. IF YOU CHECKED OFF ANY PROBLEMS, HOW DIFFICULT HAVE THESE PROBLEMS MADE IT FOR YOU TO DO YOUR WORK, TAKE CARE OF THINGS AT HOME, OR GET ALONG WITH OTHER PEOPLE: SOMEWHAT DIFFICULT

## 2025-03-13 ENCOUNTER — OFFICE VISIT (OUTPATIENT)
Dept: FAMILY MEDICINE | Facility: CLINIC | Age: 45
End: 2025-03-13
Payer: COMMERCIAL

## 2025-03-13 VITALS
OXYGEN SATURATION: 97 % | SYSTOLIC BLOOD PRESSURE: 110 MMHG | BODY MASS INDEX: 25.2 KG/M2 | HEART RATE: 80 BPM | TEMPERATURE: 98 F | DIASTOLIC BLOOD PRESSURE: 78 MMHG | WEIGHT: 171.7 LBS | RESPIRATION RATE: 16 BRPM

## 2025-03-13 DIAGNOSIS — F32.1 CURRENT MODERATE EPISODE OF MAJOR DEPRESSIVE DISORDER WITHOUT PRIOR EPISODE (H): ICD-10-CM

## 2025-03-13 RX ORDER — VENLAFAXINE HYDROCHLORIDE 75 MG/1
75 CAPSULE, EXTENDED RELEASE ORAL DAILY
Qty: 90 CAPSULE | Refills: 3 | Status: SHIPPED | OUTPATIENT
Start: 2025-03-13

## 2025-03-13 ASSESSMENT — PAIN SCALES - GENERAL: PAINLEVEL_OUTOF10: NO PAIN (0)

## 2025-03-13 NOTE — LETTER
March 13, 2025      Viktoriya VOSS [place]  [address] [place] [address]        To Whom It May Concern,       Viktoriya Lovelace has been cleared to return back to work. No work restriction upon returning back to work. If you have any questions, may contact at [phone].     Sincerely,        SONYA Carrington CNP    Electronically signed

## 2025-03-13 NOTE — PROGRESS NOTES
"  Assessment & Plan     Current moderate episode of major depressive disorder without prior episode (H)  In January was started on venlafaxine 75 mg daily.  2/24/2025 venlafaxine was increased to 150 mg daily.  With increased dose, reports symptoms of increased jaw clenching that has led to headaches.  Patient states mental health was stable on venlafaxine 75 mg daily.  Provided refill.  At previous visit filled out [facility] paperwork dates 1/16/2025 to 3/16/2025.  Patient is ready to return back to work and needs a letter stating she is cleared to return back to work without restrictions.  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    - venlafaxine (EFFEXOR XR) 75 MG 24 hr capsule; Take 1 capsule (75 mg) by mouth daily.    The longitudinal plan of care for the diagnosis(es)/condition(s) as documented were addressed during this visit. Due to the added complexity in care, I will continue to support Dulce in the subsequent management and with ongoing continuity of care.    BMI  Estimated body mass index is 25.2 kg/m  as calculated from the following:    Height as of 1/23/25: 1.758 m (5' 9.21\").    Weight as of this encounter: 77.9 kg (171 lb 11.2 oz).     Subjective   Dulce is a 44 year old, presenting for the following health issues:  Recheck Medication and Letter for School/Work (Return to work Monday 3/17/25)      3/13/2025    10:46 AM   Additional Questions   Roomed by Jacqueline CLARK CMA     History of Present Illness       Mental Health Follow-up:  Patient presents to follow-up on Depression & Anxiety.Patient's depression since last visit has been:  Better  The patient is not having other symptoms associated with depression.  Patient's anxiety since last visit has been:  Better  The patient is not having other symptoms associated with anxiety.  Any significant life events: No  Patient is not feeling anxious or having panic attacks.  Patient has no concerns about alcohol or drug use.    She eats 0-1 servings of fruits and vegetables " daily.She consumes 2 sweetened beverage(s) daily.She exercises with enough effort to increase her heart rate 9 or less minutes per day.  She exercises with enough effort to increase her heart rate 3 or less days per week.   She is taking medications regularly.      Patient is a 44-year-old female presenting for mental health follow-up.  Medical history includes depression, anxiety, PMDD, migraines, hypothyroidism, GERD.    2025 patient started on venlafaxine 75 mg daily.  Reports side effects with Zoloft, citalopram, Lexapro.  After starting venlafaxine, reported improvement of mental health, return of appetite, spending time with friends and family, not isolating self.  It was decided to increase venlafaxine to see if there were greater benefits.  2025 venlafaxine was increased to 150 mg daily.  With increased dose, patient reports symptoms of increased jaw clenching and that has led to headaches.  Patient would like to return to 75 mg dose.  Patient states her mental health is in a much better state.  She is ready to return back to work.  At previous visit filled out [place] paperwork dates 2025 to 3/16/2025.  Patient needs a letter stating she is cleared to return back to work without restrictions.  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      Review of Systems  Review of systems negative otherwise known HPI.    No past medical history on file.    Past Surgical History:   Procedure Laterality Date    OTHER SURGICAL HISTORY      Cosmetic Ear Surgery    TUBAL LIGATION         Family History   Problem Relation Age of Onset    Melanoma Mother         on face    Diabetes Type 2  Father     Coronary Artery Disease Father     Acute Myocardial Infarction Father        Social History     Tobacco Use    Smoking status: Former     Current packs/day: 0.00     Average packs/day: 0.8 packs/day for 16.0 years (12.0 ttl pk-yrs)     Types: Cigarettes     Start date:      Quit date: 2012     Years since quittin.2     Smokeless tobacco: Never   Substance Use Topics    Alcohol use: No       Current Outpatient Medications   Medication Sig Dispense Refill    famotidine (PEPCID) 20 MG tablet Take 1 tablet (20 mg) by mouth 2 times daily. 30 tablet 0    hydrOXYzine HCl (ATARAX) 25 MG tablet Take 1 tablet (25 mg) by mouth 3 times daily as needed for anxiety 90 tablet 3    levothyroxine (SYNTHROID/LEVOTHROID) 50 MCG tablet Take 1 tablet (50 mcg) by mouth daily. 90 tablet 0    LORazepam (ATIVAN) 0.5 MG tablet Take 1 tablet (0.5 mg) by mouth every 6 hours as needed for anxiety. 10 tablet 0    sucralfate (CARAFATE) 1 GM/10ML suspension Take 10 mLs (1 g) by mouth 4 times daily. 414 mL 2    venlafaxine (EFFEXOR XR) 150 MG 24 hr capsule Take 1 capsule (150 mg) by mouth daily. 30 capsule 0     No current facility-administered medications for this visit.       Objective    /78 (BP Location: Left arm, Patient Position: Sitting, Cuff Size: Adult Regular)   Pulse 80   Temp 98  F (36.7  C) (Temporal)   Resp 16   Wt 77.9 kg (171 lb 11.2 oz)   LMP 02/26/2025 (Approximate)   SpO2 97%   BMI 25.20 kg/m    Body mass index is 25.2 kg/m .  Physical Exam   General: Alert, oriented, no acute distress.    Respiratory: Easy work of breathing.   Cardiovascular: Appears warm and well-perfused.    Skin: No abnormalities noted on visualized skin.   Neurologic: Mentation intact and speech normal.     Psychiatric: Appropriate affect.     Signed Electronically by: SONYA Carrington CNP

## 2025-07-06 DIAGNOSIS — E03.9 ACQUIRED HYPOTHYROIDISM: ICD-10-CM

## 2025-07-07 ENCOUNTER — MYC REFILL (OUTPATIENT)
Dept: FAMILY MEDICINE | Facility: CLINIC | Age: 45
End: 2025-07-07
Payer: COMMERCIAL

## 2025-07-07 DIAGNOSIS — E03.9 ACQUIRED HYPOTHYROIDISM: ICD-10-CM

## 2025-07-07 RX ORDER — LEVOTHYROXINE SODIUM 50 UG/1
50 TABLET ORAL DAILY
Qty: 90 TABLET | Refills: 0 | OUTPATIENT
Start: 2025-07-07

## 2025-07-07 RX ORDER — LEVOTHYROXINE SODIUM 50 UG/1
50 TABLET ORAL DAILY
Qty: 90 TABLET | Refills: 1 | Status: SHIPPED | OUTPATIENT
Start: 2025-07-07

## 2025-08-10 ENCOUNTER — HEALTH MAINTENANCE LETTER (OUTPATIENT)
Age: 45
End: 2025-08-10